# Patient Record
Sex: MALE | Race: WHITE | NOT HISPANIC OR LATINO | Employment: OTHER | URBAN - METROPOLITAN AREA
[De-identification: names, ages, dates, MRNs, and addresses within clinical notes are randomized per-mention and may not be internally consistent; named-entity substitution may affect disease eponyms.]

---

## 2017-02-15 ENCOUNTER — ALLSCRIPTS OFFICE VISIT (OUTPATIENT)
Dept: OTHER | Facility: OTHER | Age: 77
End: 2017-02-15

## 2017-02-15 LAB
CLARITY UR: NORMAL
COLOR UR: YELLOW
GLUCOSE (HISTORICAL): NORMAL
HGB UR QL STRIP.AUTO: NORMAL
KETONES UR STRIP-MCNC: NORMAL MG/DL
LEUKOCYTE ESTERASE UR QL STRIP: NORMAL
NITRITE UR QL STRIP: NORMAL
PH UR STRIP.AUTO: 5 [PH]
PROT UR STRIP-MCNC: NORMAL MG/DL
SP GR UR STRIP.AUTO: 1.01

## 2018-01-14 VITALS
HEIGHT: 66 IN | WEIGHT: 175 LBS | BODY MASS INDEX: 28.12 KG/M2 | HEART RATE: 84 BPM | SYSTOLIC BLOOD PRESSURE: 160 MMHG | DIASTOLIC BLOOD PRESSURE: 84 MMHG

## 2018-07-17 ENCOUNTER — TRANSCRIBE ORDERS (OUTPATIENT)
Dept: ADMINISTRATIVE | Facility: HOSPITAL | Age: 78
End: 2018-07-17

## 2018-07-17 DIAGNOSIS — R06.00 DYSPNEA, UNSPECIFIED TYPE: Primary | ICD-10-CM

## 2018-07-23 ENCOUNTER — HOSPITAL ENCOUNTER (OUTPATIENT)
Dept: PULMONOLOGY | Facility: HOSPITAL | Age: 78
Discharge: HOME | End: 2018-07-23
Attending: INTERNAL MEDICINE
Payer: MEDICARE

## 2018-07-23 DIAGNOSIS — R06.00 DYSPNEA, UNSPECIFIED TYPE: ICD-10-CM

## 2018-07-23 PROCEDURE — 94726 PLETHYSMOGRAPHY LUNG VOLUMES: CPT

## 2018-07-23 PROCEDURE — 94729 DIFFUSING CAPACITY: CPT | Performed by: INTERNAL MEDICINE

## 2018-07-23 PROCEDURE — 94726 PLETHYSMOGRAPHY LUNG VOLUMES: CPT | Performed by: INTERNAL MEDICINE

## 2018-07-23 PROCEDURE — 94060 EVALUATION OF WHEEZING: CPT | Performed by: INTERNAL MEDICINE

## 2018-07-23 PROCEDURE — 94760 N-INVAS EAR/PLS OXIMETRY 1: CPT

## 2018-07-23 PROCEDURE — 94729 DIFFUSING CAPACITY: CPT

## 2018-07-23 PROCEDURE — 94060 EVALUATION OF WHEEZING: CPT

## 2018-07-23 RX ORDER — ALBUTEROL SULFATE 2.5 MG/3ML
2.5 SOLUTION RESPIRATORY (INHALATION) ONCE
Status: COMPLETED | OUTPATIENT
Start: 2018-07-23 | End: 2018-07-23

## 2018-07-23 RX ADMIN — ALBUTEROL SULFATE 2.5 MG: 2.5 SOLUTION RESPIRATORY (INHALATION) at 09:40

## 2019-01-07 ENCOUNTER — TRANSCRIBE ORDERS (OUTPATIENT)
Dept: ADMINISTRATIVE | Facility: HOSPITAL | Age: 79
End: 2019-01-07

## 2019-01-07 DIAGNOSIS — M51.26 DISPLACEMENT OF LUMBAR INTERVERTEBRAL DISC WITHOUT MYELOPATHY: Primary | ICD-10-CM

## 2019-01-10 ENCOUNTER — HOSPITAL ENCOUNTER (OUTPATIENT)
Dept: RADIOLOGY | Age: 79
Discharge: HOME | End: 2019-01-10
Payer: MEDICARE

## 2019-01-10 DIAGNOSIS — M51.26 DISPLACEMENT OF LUMBAR INTERVERTEBRAL DISC WITHOUT MYELOPATHY: ICD-10-CM

## 2019-01-10 PROCEDURE — 72148 MRI LUMBAR SPINE W/O DYE: CPT

## 2019-01-21 ENCOUNTER — EVALUATION (OUTPATIENT)
Dept: PHYSICAL THERAPY | Facility: REHABILITATION | Age: 79
End: 2019-01-21
Payer: MEDICARE

## 2019-01-21 DIAGNOSIS — M54.41 ACUTE BILATERAL LOW BACK PAIN WITH RIGHT-SIDED SCIATICA: Primary | ICD-10-CM

## 2019-01-21 PROCEDURE — G8978 MOBILITY CURRENT STATUS: HCPCS | Performed by: PHYSICAL THERAPIST

## 2019-01-21 PROCEDURE — 97112 NEUROMUSCULAR REEDUCATION: CPT | Performed by: PHYSICAL THERAPIST

## 2019-01-21 PROCEDURE — G8979 MOBILITY GOAL STATUS: HCPCS | Performed by: PHYSICAL THERAPIST

## 2019-01-21 PROCEDURE — 97161 PT EVAL LOW COMPLEX 20 MIN: CPT | Performed by: PHYSICAL THERAPIST

## 2019-01-21 NOTE — PROGRESS NOTES
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, limiting his ability to walk, stand, and perform cleaning activities in his home  Impairments include:  1) Decreased Range of Motion   2) Aberrant Movement   3) Pain limiting Function     Etiologic factors include none recalled by the patient  Impairments: abnormal coordination, abnormal muscle firing, abnormal muscle tone, abnormal or restricted ROM, abnormal movement, activity intolerance, difficulty understanding, impaired physical strength, lacks appropriate home exercise program, pain with function, poor posture  and poor body mechanics    Symptom irritability: moderateUnderstanding of Dx/Px/POC: good   Prognosis: good  Prognosis details: Positive prognostic indicators include positive attitude toward recovery  Negative prognostic indicators include chronicity of symptoms,     Goals  Short Term Goal 1: Patient will be independent with home exercise program    Short Term Goal 2: Patient will be able to manage symptoms independently  Long Term Goal 1: Patient will be able to walk for 1/2 mile without complaints of low back pain  Long Term Goal 2: Patient will be able to play with his grandchildren without low back pain       Plan  Plan details: Prognosis above is given PT services 2x/week tapering to 1x/week over the next 2 months and home program adherence  Patient would benefit from: skilled physical therapy  Planned modality interventions: thermotherapy: hydrocollator packs  Planned therapy interventions: activity modification, joint mobilization, manual therapy, motor coordination training, neuromuscular re-education, patient education, self care, therapeutic activities, therapeutic exercise, graded activity, home exercise program and behavior modification  Plan of Care beginning date: 2019  Plan of Care expiration date: 3/18/2019  Treatment plan discussed with: patient        Subjective Evaluation    Pain  Current pain ratin  At best pain ratin  At worst pain ratin  Quality: radiating and sharp  Relieving factors: rest and relaxation  Aggravating factors: standing and walking  Progression: worsening    Patient Goals  Patient goals for therapy: decreased pain  Patient goal: Return to walking one mile, being able to play with his grandkids, and being able to clean around home without back pain         Objective     General Comments     Lumbar Comments  History of Pertinent Illness: Patient reports he has had chronic low back pain over the past ten years, that has recently exacerbated over the past 3 weeks  He states that he loves to walk with his wife; however, over the past 3 weeks, if he walks more than one block, he gets shooting pain down the front of his leg as well as pain in his back, and he has to sit down  He states that he had an MRI of his low back two weeks ago at [place] [address]  Functional Limitations: Standing and walking aggravates his pain, making it difficult to walk more than one block as well as do cleaning around his home that involves standing, such as dishes, laundry, and vacuuming  Social History: Patient lives in [address] home with his wife       Occupation: Retired                                   [address]                         Hip :                             Flex: 5/5 bilateral            Ext: 5/5 bilateral   ABD: 4-/5 bilateral    Add: 4-/5 bilateral    IR: 4+/5 bilateral   ER: 4+/5 bilateral       Lower Quarter Screen:   Dermatomal Screen: Diminished L4 and S1 sensation R side  Myomotal Screen: unremarkable  Reflexes: 2+ patellar tendon and achilles tendon reflexes bilaterally       Foot position in standing: normal ER in standing with no signs of forefoot nor rearfoot varus/valgus  GAIT: Normal   Palpation: land marks= Slight TTP of R L4 and L5 segments during PA mobilizations    Special test                Hip/SI joint:   scour= -     malik =  -   capsular mobility= -          long axis distraction (hip) = -      SLS=   R 8 seconds, L 2 seconds          test=-            P4 test=               Distraction= -             Active SLR= positive R                                          LUMBAR tests:  SLR = positive R    slump = negative          Repetitive testing: extension  = negative    flexion    =  Negative     No history of fracture, surgery, recent illness, osteoporosis       RED flags               no Night pain, no history of cancer, no LE swelling           Precautions: N/A    Daily Treatment Diary     Manual                                                                                   Exercise Diary  1/21            Prone Press Ups  1x10            HS Stretch with strap  20"x5                                                                                                                                                                                                                                                          Modalities

## 2019-01-22 ENCOUNTER — OFFICE VISIT (OUTPATIENT)
Dept: PHYSICAL THERAPY | Facility: REHABILITATION | Age: 79
End: 2019-01-22
Payer: MEDICARE

## 2019-01-22 DIAGNOSIS — M54.41 ACUTE BILATERAL LOW BACK PAIN WITH RIGHT-SIDED SCIATICA: Primary | ICD-10-CM

## 2019-01-22 PROCEDURE — 97112 NEUROMUSCULAR REEDUCATION: CPT | Performed by: PHYSICAL THERAPIST

## 2019-01-22 PROCEDURE — 97110 THERAPEUTIC EXERCISES: CPT | Performed by: PHYSICAL THERAPIST

## 2019-01-22 NOTE — PROGRESS NOTES
Daily Note     Today's date: 2019  Patient name: Karol Landaverde  : 1940  MRN: [id number]  Referring provider: Swathi Alejandro MD  Dx:   Encounter Diagnosis     ICD-10-CM    1  Acute bilateral low back pain with right-sided sciatica M54 41                   Subjective: Patient reports that he went grocery shopping today for about an hour  He states that he feels he was able to walk a bit better and further than he was before physical therapy  He states that he still does get the pain in his back and down his leg after he walks for a certain period of time  Objective: See treatment diary below  Manual                                                                                   Exercise Diary             Prone Press Ups  1x10 2x10           HS Stretch with strap  20"x5            Lower Trunk Rotation   2x10 (5" hold)           Recumbent bike   10'           Prone Press up R side bias   1x10           Bridges   2x10           Clamshells   2x10 green TB                                                                                                                                                                                        Modalities                                                         Assessment: Tolerated treatment well  He did not report symptoms of back pain during prone press ups or when walking around the clinic after performing this exercise  Prone press ups were again attempted with a R sided bias to see if pain would be elicited, which it was not  The patient did do well with initiation of hip abductor and extensor strengthening today  He has tendency to Valsalva, and was cued to breathe properly during concentric and eccentric portions of exercise  Patient demonstrated fatigue post treatment  Plan: Continue per plan of care

## 2019-01-29 ENCOUNTER — OFFICE VISIT (OUTPATIENT)
Dept: PHYSICAL THERAPY | Facility: REHABILITATION | Age: 79
End: 2019-01-29
Payer: MEDICARE

## 2019-01-29 DIAGNOSIS — M54.41 ACUTE BILATERAL LOW BACK PAIN WITH RIGHT-SIDED SCIATICA: Primary | ICD-10-CM

## 2019-01-29 PROCEDURE — 97140 MANUAL THERAPY 1/> REGIONS: CPT | Performed by: PHYSICAL THERAPIST

## 2019-01-29 PROCEDURE — 97110 THERAPEUTIC EXERCISES: CPT | Performed by: PHYSICAL THERAPIST

## 2019-01-29 NOTE — PROGRESS NOTES
Daily Note     Today's date: 2019  Patient name: Tony Lo  : 1940  MRN: [id number]  Referring provider: Lloyd Rubio MD  Dx:   Encounter Diagnosis     ICD-10-CM    1  Acute bilateral low back pain with right-sided sciatica M54 41                   Subjective: Patient reports that he was doing well after his last visit, until the last couple of days the pain became severe again  He was walking in the grocery store yesterday and had immediate pain in his low back       Objective: See treatment diary below    Ruled out R SIJ: Negative Gaenslens, Negative SI compression, Negative SI Distraction   Pain reproduced in the R toes during R L5 UPA     Manual              R L5 UPA's Grade III --> IV SE                                                                    Exercise Diary            Prone Press Ups  1x10 2x10 30          HS Stretch with strap  20"x5            Lower Trunk Rotation   2x10 (5" hold)           Recumbent bike   10'           Prone Press up R side bias   1x10           Bridges   2x10           Clamshells   2x10 green TB           Treadmill walking    20' (8 min increment/ 12' increment  1 4 mph 2 0 incline                                                                                                                                                                           Modalities                                                       Assessment: Tolerated treatment well  Much of the treatment was assessing the patient's ability to walk to see when back pain occurred  No reproduction of pain was elicited when walking on the treadmill  After walking, he did complain of mild pain centrally in his back that did not radiate  Toya Extension press ups were attempted, and there was no change in symptoms   R sided UPA's of L5 reproduced symptoms of numbness in toes of R foot, that went away when pressure was taken away during manual  Will need to continue to assess patient to see if he is responding to extension bias or if treatment plan needs to be changed towards segmental mobility, or another strategy  Patient was educated to attempt press ups as soon as he can after he has onset of back pain to see if he responds positively or negatively  Patient demonstrated fatigue post treatment      Plan: Continue per plan of care    Look at extension in weight bearing, including standing position  Take segmental mobility one step further to assess reproduction of pain with UPA's of lumbar spine

## 2019-02-01 ENCOUNTER — OFFICE VISIT (OUTPATIENT)
Dept: PHYSICAL THERAPY | Facility: REHABILITATION | Age: 79
End: 2019-02-01
Payer: MEDICARE

## 2019-02-01 DIAGNOSIS — M54.41 ACUTE BILATERAL LOW BACK PAIN WITH RIGHT-SIDED SCIATICA: Primary | ICD-10-CM

## 2019-02-01 PROCEDURE — 97110 THERAPEUTIC EXERCISES: CPT | Performed by: PHYSICAL THERAPIST

## 2019-02-01 PROCEDURE — 97140 MANUAL THERAPY 1/> REGIONS: CPT | Performed by: PHYSICAL THERAPIST

## 2019-02-01 NOTE — PROGRESS NOTES
Daily Note     Today's date: 2019  Patient name: Jess Muniz  : 1940  MRN: [id number]  Referring provider: Ignacio Kam MD  Dx: No diagnosis found  Subjective: Patient reports that his pain has been coming and going, yet he feels that he is able to do more activity  He states that he plowed both his driveway and his neighbor's driveway for 2 hours, and did not have much back pain at all  Objective: See treatment diary below       Manual             R L5 UPA's Grade III --> IV SE SE                                                                   Exercise Diary           Prone Press Ups  1x10 2x10 30 30         HS Stretch with strap  20"x5            Lower Trunk Rotation   2x10 (5" hold)           Recumbent bike   10'           Prone Press up R side bias   1x10           Bridges   2x10  2x10         Clamshells   2x10 green TB  2x20 orange TB         Treadmill walking    20' (8 min increment/ 12' increment  1 4 mph 2 0 incline  10' (1 5 mph)          Standing storks     1x10 orang e TB                                                                                                                                                             Modalities                                                       Assessment: Tolerated treatment well  Patient had first acute flare up and true "reproduction of symptoms" when performing standing right hip extension with a theraband  He did have alleviated symptoms after performing prone press ups  Patient does report some tingling in his toes that is intermittent, and still dull, midline low back pain  His functional activity tolerance has improved as he was able to walk more, and plow for 2 hours, yet he still does have acute flare ups of back pain at home, and the one today in the clinic   Will still need to work on progressing exercise and continuing with current extension based program to see if patient continues to trend in the right direction  His function has improved with the current treatment plan, yet his back pain has not completely gone away  He did state that he has not taken a pain pill in over a week in a half, and states he thinks his pain tolerance has improved  He also is highly compliant with physical therapy and does not want to rely on medications or injections  Patient demonstrated fatigue post treatment today  Plan: Continue per plan of care  Continue to assess patient's origin of pain utilizing extension based program and manual therapy

## 2019-02-05 ENCOUNTER — OFFICE VISIT (OUTPATIENT)
Dept: PHYSICAL THERAPY | Facility: REHABILITATION | Age: 79
End: 2019-02-05
Payer: MEDICARE

## 2019-02-05 DIAGNOSIS — M54.41 ACUTE BILATERAL LOW BACK PAIN WITH RIGHT-SIDED SCIATICA: Primary | ICD-10-CM

## 2019-02-05 PROCEDURE — 97110 THERAPEUTIC EXERCISES: CPT

## 2019-02-05 PROCEDURE — 97112 NEUROMUSCULAR REEDUCATION: CPT

## 2019-02-05 PROCEDURE — 97140 MANUAL THERAPY 1/> REGIONS: CPT

## 2019-02-05 NOTE — PROGRESS NOTES
Daily Note     Today's date: 2019  Patient name: Niko Santillan  : 1940  MRN: [id number]  Referring provider: Konnie Spatz, MD  Dx:   Encounter Diagnosis     ICD-10-CM    1  Acute bilateral low back pain with right-sided sciatica M54 41                   Subjective: Pt reports feeling improvement upon arrival, states that he still has some pain, but is able to walk further distances before pain increases  Pt states that sitting an resting for a short time makes pain subside  Objective: See treatment diary below        Manual            R L5 UPA's Grade III --> IV SE SE TP                                                                  Exercise Diary          Prone Press Ups  1x10 2x10 30 30 30x        HS Stretch with strap  20"x5            Lower Trunk Rotation   2x10 (5" hold)           Recumbent bike   10'           Prone Press up R side bias   1x10           Bridges   2x10  2x10 2x10        Clamshells   2x10 green TB  2x20 orange TB 20 ea b/l OTB        Treadmill walking    20' (8 min increment/ 12' increment  1 4 mph 2 0 incline  10' (1 5 mph)  10' (2 2mph)        Standing storks     1x10 orang e TB  10x ea 3 way OTB                                                                                                                                                           Modalities                                                         Assessment: Tolerated treatment well  Patient exhibited good technique with therapeutic exercises and would benefit from continued PT  Pt able to perform storks this session with no complaint of pain or radicular sx  Pt did have mild increase of pain with kicks forward during storks, no pain with ext or abd  Pt had some minor cramping with prone pushups that subsided with rest, but no increase in pain or sx  Pt able to progress speed on TM without complaint    Mobs performed by TP, PT   Pt  1:1 with PTA for entirety  Plan: Continue per plan of care

## 2019-02-12 ENCOUNTER — APPOINTMENT (OUTPATIENT)
Dept: PHYSICAL THERAPY | Facility: REHABILITATION | Age: 79
End: 2019-02-12
Payer: MEDICARE

## 2019-02-13 ENCOUNTER — OFFICE VISIT (OUTPATIENT)
Dept: PHYSICAL THERAPY | Facility: REHABILITATION | Age: 79
End: 2019-02-13
Payer: MEDICARE

## 2019-02-13 DIAGNOSIS — M54.41 ACUTE BILATERAL LOW BACK PAIN WITH RIGHT-SIDED SCIATICA: Primary | ICD-10-CM

## 2019-02-13 PROCEDURE — 97110 THERAPEUTIC EXERCISES: CPT | Performed by: PHYSICAL THERAPIST

## 2019-02-13 NOTE — PROGRESS NOTES
PT Discharge     Today's date: 2019  Patient name: Ayah Garcia  : 1940  MRN: [id number]  Referring provider: Shayy Landis MD  Dx: No diagnosis found  Assessment  Assessment details: Ayah Garcia is a pleasant 77 y o  male who presents with chronic low back pain  The patient's greatest concerns are worry over not knowing what's wrong, fear of not being able to keep active, and especially walking with his wife everyday  Since the patient has started physical therapy, he has made great improvements in his ability to perform daily activities, including walking with his wife, going to the grocery store, standing for long periods of time, and cleaning around his home  He has become independent with his home exercise program, and has a great understanding of how to continue exercises without external assistance  As a result of his functional improvements as well as decrease in symptoms, the patient is now being discharged from physical therapy  Goals  Short Term Goal 1: Patient will be independent with home exercise program  (met)  Short Term Goal 2: Patient will be able to manage symptoms independently  (met)    Long Term Goal 1: Patient will be able to walk for 1/2 mile without complaints of low back pain  (met)  Long Term Goal 2: Patient will be able to play with his grandchildren without low back pain  (met)    Plan  Patient will continue extension based exercises as well as walking program independently as he is being discharged from physical therapy           Subjective Evaluation    Pain  Current pain ratin  At best pain ratin  At worst pain ratin  Quality: dull ache    Patient Goals  Patient goals for therapy: decreased pain (met)   Patient goal: Return to walking one mile, being able to play with his grandkids, and being able to clean around home without back pain (met)        Objective     General Comments     Lumbar Comments  History of Pertinent Illness: Patient reports he has had chronic low back pain over the past ten years, that has recently exacerbated over the past 3 weeks  He states that he loves to walk with his wife; however, over the past 3 weeks, if he walks more than one block, he gets shooting pain down the front of his leg as well as pain in his back, and he has to sit down  He states that he had an MRI of his low back two weeks ago at [place] [address]  Functional Limitations: Standing and walking aggravates his pain, making it difficult to walk more than one block as well as do cleaning around his home that involves standing, such as dishes, laundry, and vacuuming  Social History: Patient lives in 2 story home with his wife       Occupation: Retired                                   [address]                         Hip :                             Flex: 5/5 bilateral          `  Ext: 5/5 bilateral   ABD: 4+/5 bilateral    Add: 4/5 bilateral    IR: 4+/5 bilateral   ER: 4+/5 bilateral       Lower Quarter Screen:   Dermatomal Screen: Diminished L4 and L5 sensation R side  Myomotal Screen: unremarkable  Reflexes: 2+ patellar tendon and achilles tendon reflexes bilaterally       Foot position in standing: normal ER in standing with no signs of forefoot nor rearfoot varus/valgus  GAIT: Normal   Palpation: unremarkable   Special test                Hip/SI joint:   scour= -     malik =  -   capsular mobility= -          long axis distraction (hip) = -      SLS=   R 10 seconds, L 5 seconds          test=-            P4 test=               Distraction= -             Active SLR= negative                                        LUMBAR tests:  SLR = negative R    slump = negative          Repetitive testing: extension  = negative    flexion    =  Negative     No history of fracture, surgery, recent illness, osteoporosis       RED flags               no Night pain, no history of cancer, no LE swelling Daily Treatment Diary   Manual  1/29 2/1 2/5          R L5 UPA's Grade III --> IV SE SE TP                                                                  Exercise Diary  1/21 1/22 1/29 2/1 2/5 2/13       Prone Press Ups  1x10 2x10 30 30 30x 30x       HS Stretch with strap  20"x5            Lower Trunk Rotation   2x10 (5" hold)           Recumbent bike   10'           Prone Press up R side bias   1x10           Bridges   2x10  2x10 2x10 2x10       Clamshells   2x10 green TB  2x20 orange TB 20 ea b/l OTB 20 ea b/l OTB       Treadmill walking    20' (8 min increment/ 12' increment  1 4 mph 2 0 incline  10' (1 5 mph)  10' (2 2mph) 10' 2 2 mph       Standing storks     1x10 orang e TB  10x ea 3 way OTB                                                                                                                                                           Modalities

## 2019-02-15 ENCOUNTER — APPOINTMENT (OUTPATIENT)
Dept: PHYSICAL THERAPY | Facility: REHABILITATION | Age: 79
End: 2019-02-15
Payer: MEDICARE

## 2019-04-10 ENCOUNTER — TELEPHONE (OUTPATIENT)
Dept: PAIN MEDICINE | Facility: CLINIC | Age: 79
End: 2019-04-10

## 2019-05-08 ENCOUNTER — OFFICE VISIT (OUTPATIENT)
Dept: PAIN MEDICINE | Facility: CLINIC | Age: 79
End: 2019-05-08
Payer: MEDICARE

## 2019-05-08 ENCOUNTER — HOSPITAL ENCOUNTER (OUTPATIENT)
Dept: RADIOLOGY | Facility: HOSPITAL | Age: 79
Discharge: HOME | End: 2019-05-08
Attending: ANESTHESIOLOGY
Payer: MEDICARE

## 2019-05-08 VITALS — SYSTOLIC BLOOD PRESSURE: 140 MMHG | TEMPERATURE: 97.8 F | DIASTOLIC BLOOD PRESSURE: 76 MMHG | HEART RATE: 84 BPM

## 2019-05-08 DIAGNOSIS — M48.062 LUMBAR STENOSIS WITH NEUROGENIC CLAUDICATION: Primary | ICD-10-CM

## 2019-05-08 DIAGNOSIS — M48.062 LUMBAR STENOSIS WITH NEUROGENIC CLAUDICATION: ICD-10-CM

## 2019-05-08 PROCEDURE — 73502 X-RAY EXAM HIP UNI 2-3 VIEWS: CPT

## 2019-05-08 PROCEDURE — 99204 OFFICE O/P NEW MOD 45 MIN: CPT | Performed by: ANESTHESIOLOGY

## 2019-05-08 RX ORDER — IRBESARTAN 150 MG/1
75 TABLET ORAL DAILY
COMMUNITY
Start: 2019-02-14 | End: 2020-12-21 | Stop reason: SDUPTHER

## 2019-05-08 RX ORDER — VITAMIN B COMPLEX
200 TABLET ORAL DAILY
COMMUNITY
Start: 2015-08-17

## 2019-05-08 RX ORDER — AMLODIPINE BESYLATE 5 MG/1
1 TABLET ORAL DAILY
COMMUNITY
Start: 2015-12-14 | End: 2020-09-28

## 2019-05-10 ENCOUNTER — TELEPHONE (OUTPATIENT)
Dept: PAIN MEDICINE | Facility: CLINIC | Age: 79
End: 2019-05-10

## 2019-05-10 ENCOUNTER — TRANSCRIBE ORDERS (OUTPATIENT)
Dept: PAIN MEDICINE | Facility: CLINIC | Age: 79
End: 2019-05-10

## 2019-05-28 ENCOUNTER — HOSPITAL ENCOUNTER (OUTPATIENT)
Dept: RADIOLOGY | Facility: CLINIC | Age: 79
Discharge: HOME | End: 2019-05-28
Attending: ANESTHESIOLOGY | Admitting: ANESTHESIOLOGY
Payer: MEDICARE

## 2019-05-28 VITALS
DIASTOLIC BLOOD PRESSURE: 81 MMHG | OXYGEN SATURATION: 97 % | RESPIRATION RATE: 20 BRPM | HEART RATE: 91 BPM | SYSTOLIC BLOOD PRESSURE: 155 MMHG | TEMPERATURE: 98.2 F

## 2019-05-28 DIAGNOSIS — M48.062 LUMBAR STENOSIS WITH NEUROGENIC CLAUDICATION: ICD-10-CM

## 2019-05-28 PROCEDURE — 64484 NJX AA&/STRD TFRM EPI L/S EA: CPT | Performed by: ANESTHESIOLOGY

## 2019-05-28 PROCEDURE — 64483 NJX AA&/STRD TFRM EPI L/S 1: CPT | Performed by: ANESTHESIOLOGY

## 2019-05-28 RX ORDER — METHYLPREDNISOLONE ACETATE 80 MG/ML
80 INJECTION, SUSPENSION INTRA-ARTICULAR; INTRALESIONAL; INTRAMUSCULAR; PARENTERAL; SOFT TISSUE ONCE
Status: COMPLETED | OUTPATIENT
Start: 2019-05-28 | End: 2019-05-28

## 2019-05-28 RX ORDER — 0.9 % SODIUM CHLORIDE 0.9 %
10 VIAL (ML) INJECTION ONCE
Status: COMPLETED | OUTPATIENT
Start: 2019-05-28 | End: 2019-05-28

## 2019-05-28 RX ORDER — BUPIVACAINE HCL/PF 2.5 MG/ML
10 VIAL (ML) INJECTION ONCE
Status: COMPLETED | OUTPATIENT
Start: 2019-05-28 | End: 2019-05-28

## 2019-05-28 RX ORDER — DIPHENOXYLATE HYDROCHLORIDE AND ATROPINE SULFATE 2.5; .025 MG/1; MG/1
1 TABLET ORAL DAILY
COMMUNITY

## 2019-05-28 RX ORDER — MELATONIN 10 MG
2000 TABLET, SUBLINGUAL SUBLINGUAL DAILY
COMMUNITY

## 2019-05-28 RX ADMIN — METHYLPREDNISOLONE ACETATE 80 MG: 80 INJECTION, SUSPENSION INTRA-ARTICULAR; INTRALESIONAL; INTRAMUSCULAR; PARENTERAL; SOFT TISSUE at 10:00

## 2019-05-28 RX ADMIN — Medication 5 ML: at 09:55

## 2019-05-28 RX ADMIN — SODIUM CHLORIDE 5 ML: 9 INJECTION, SOLUTION INTRAMUSCULAR; INTRAVENOUS; SUBCUTANEOUS at 09:55

## 2019-05-28 RX ADMIN — BUPIVACAINE HYDROCHLORIDE 2 ML: 2.5 INJECTION, SOLUTION EPIDURAL; INFILTRATION; INTRACAUDAL at 10:00

## 2019-05-28 RX ADMIN — IOHEXOL 1 ML: 300 INJECTION, SOLUTION INTRAVENOUS at 09:59

## 2019-06-04 ENCOUNTER — TELEPHONE (OUTPATIENT)
Dept: PAIN MEDICINE | Facility: CLINIC | Age: 79
End: 2019-06-04

## 2019-06-04 NOTE — TELEPHONE ENCOUNTER
Patient states he has 80% improvement and his pain level 2 - 3  Patient states he started experience "sean horse," more often since he's had his injection  He would like to know if that is a normal side effect or what may be causing them   Please advise, nathan    Call back# [phone]

## 2019-07-15 ENCOUNTER — APPOINTMENT (OUTPATIENT)
Dept: LAB | Age: 79
End: 2019-07-15
Payer: MEDICARE

## 2019-07-15 ENCOUNTER — TRANSCRIBE ORDERS (OUTPATIENT)
Dept: ADMINISTRATIVE | Age: 79
End: 2019-07-15

## 2019-07-15 DIAGNOSIS — Z12.5 SPECIAL SCREENING FOR MALIGNANT NEOPLASM OF PROSTATE: ICD-10-CM

## 2019-07-15 DIAGNOSIS — Z00.01 ENCOUNTER FOR GENERAL ADULT MEDICAL EXAMINATION WITH ABNORMAL FINDINGS: ICD-10-CM

## 2019-07-15 DIAGNOSIS — E03.9 MYXEDEMA HEART DISEASE: ICD-10-CM

## 2019-07-15 DIAGNOSIS — E55.9 VITAMIN D DEFICIENCY: ICD-10-CM

## 2019-07-15 DIAGNOSIS — I51.9 MYXEDEMA HEART DISEASE: Primary | ICD-10-CM

## 2019-07-15 DIAGNOSIS — E78.5 HYPERLIPIDEMIA, UNSPECIFIED HYPERLIPIDEMIA TYPE: ICD-10-CM

## 2019-07-15 DIAGNOSIS — I51.9 MYXEDEMA HEART DISEASE: ICD-10-CM

## 2019-07-15 DIAGNOSIS — E03.9 MYXEDEMA HEART DISEASE: Primary | ICD-10-CM

## 2019-07-15 LAB
25(OH)D3 SERPL-MCNC: 33.7 NG/ML (ref 30–100)
ALBUMIN SERPL BCP-MCNC: 3.7 G/DL (ref 3.5–5)
ALP SERPL-CCNC: 72 U/L (ref 46–116)
ALT SERPL W P-5'-P-CCNC: 35 U/L (ref 12–78)
ANION GAP SERPL CALCULATED.3IONS-SCNC: 4 MMOL/L (ref 4–13)
AST SERPL W P-5'-P-CCNC: 20 U/L (ref 5–45)
BACTERIA UR QL AUTO: NORMAL /HPF
BASOPHILS # BLD AUTO: 0.1 THOUSANDS/ΜL (ref 0–0.1)
BASOPHILS NFR BLD AUTO: 1 % (ref 0–1)
BILIRUB SERPL-MCNC: 0.78 MG/DL (ref 0.2–1)
BILIRUB UR QL STRIP: NEGATIVE
BUN SERPL-MCNC: 19 MG/DL (ref 5–25)
CALCIUM SERPL-MCNC: 8.7 MG/DL (ref 8.3–10.1)
CHLORIDE SERPL-SCNC: 106 MMOL/L (ref 100–108)
CHOLEST SERPL-MCNC: 151 MG/DL (ref 50–200)
CLARITY UR: NORMAL
CO2 SERPL-SCNC: 31 MMOL/L (ref 21–32)
COLOR UR: YELLOW
CREAT SERPL-MCNC: 1 MG/DL (ref 0.6–1.3)
EOSINOPHIL # BLD AUTO: 0.29 THOUSAND/ΜL (ref 0–0.61)
EOSINOPHIL NFR BLD AUTO: 4 % (ref 0–6)
ERYTHROCYTE [DISTWIDTH] IN BLOOD BY AUTOMATED COUNT: 13.2 % (ref 11.6–15.1)
GFR SERPL CREATININE-BSD FRML MDRD: 71 ML/MIN/1.73SQ M
GLUCOSE P FAST SERPL-MCNC: 109 MG/DL (ref 65–99)
GLUCOSE UR STRIP-MCNC: NEGATIVE MG/DL
HCT VFR BLD AUTO: 46.7 % (ref 36.5–49.3)
HDLC SERPL-MCNC: 55 MG/DL (ref 40–60)
HGB BLD-MCNC: 15.3 G/DL (ref 12–17)
HGB UR QL STRIP.AUTO: NEGATIVE
HYALINE CASTS #/AREA URNS LPF: NORMAL /LPF
IMM GRANULOCYTES # BLD AUTO: 0.03 THOUSAND/UL (ref 0–0.2)
IMM GRANULOCYTES NFR BLD AUTO: 0 % (ref 0–2)
KETONES UR STRIP-MCNC: NEGATIVE MG/DL
LDLC SERPL CALC-MCNC: 71 MG/DL (ref 0–100)
LEUKOCYTE ESTERASE UR QL STRIP: NEGATIVE
LYMPHOCYTES # BLD AUTO: 2.27 THOUSANDS/ΜL (ref 0.6–4.47)
LYMPHOCYTES NFR BLD AUTO: 31 % (ref 14–44)
MCH RBC QN AUTO: 29.9 PG (ref 26.8–34.3)
MCHC RBC AUTO-ENTMCNC: 32.8 G/DL (ref 31.4–37.4)
MCV RBC AUTO: 91 FL (ref 82–98)
MONOCYTES # BLD AUTO: 0.84 THOUSAND/ΜL (ref 0.17–1.22)
MONOCYTES NFR BLD AUTO: 11 % (ref 4–12)
NEUTROPHILS # BLD AUTO: 3.88 THOUSANDS/ΜL (ref 1.85–7.62)
NEUTS SEG NFR BLD AUTO: 53 % (ref 43–75)
NITRITE UR QL STRIP: NEGATIVE
NON-SQ EPI CELLS URNS QL MICRO: NORMAL /HPF
NONHDLC SERPL-MCNC: 96 MG/DL
NRBC BLD AUTO-RTO: 0 /100 WBCS
PH UR STRIP.AUTO: 7.5 [PH]
PLATELET # BLD AUTO: 252 THOUSANDS/UL (ref 149–390)
PMV BLD AUTO: 11.1 FL (ref 8.9–12.7)
POTASSIUM SERPL-SCNC: 4.1 MMOL/L (ref 3.5–5.3)
PROT SERPL-MCNC: 7.4 G/DL (ref 6.4–8.2)
PROT UR STRIP-MCNC: NEGATIVE MG/DL
PSA SERPL-MCNC: 3.1 NG/ML (ref 0–4)
RBC # BLD AUTO: 5.11 MILLION/UL (ref 3.88–5.62)
RBC #/AREA URNS AUTO: NORMAL /HPF
SODIUM SERPL-SCNC: 141 MMOL/L (ref 136–145)
SP GR UR STRIP.AUTO: 1.02 (ref 1–1.03)
TRIGL SERPL-MCNC: 125 MG/DL
TSH SERPL DL<=0.05 MIU/L-ACNC: 3.66 UIU/ML (ref 0.36–3.74)
UROBILINOGEN UR QL STRIP.AUTO: 0.2 E.U./DL
WBC # BLD AUTO: 7.41 THOUSAND/UL (ref 4.31–10.16)
WBC #/AREA URNS AUTO: NORMAL /HPF

## 2019-07-15 PROCEDURE — 85025 COMPLETE CBC W/AUTO DIFF WBC: CPT

## 2019-07-15 PROCEDURE — 84443 ASSAY THYROID STIM HORMONE: CPT

## 2019-07-15 PROCEDURE — 82306 VITAMIN D 25 HYDROXY: CPT

## 2019-07-15 PROCEDURE — 36415 COLL VENOUS BLD VENIPUNCTURE: CPT

## 2019-07-15 PROCEDURE — 80053 COMPREHEN METABOLIC PANEL: CPT

## 2019-07-15 PROCEDURE — G0103 PSA SCREENING: HCPCS

## 2019-07-15 PROCEDURE — 80061 LIPID PANEL: CPT

## 2019-07-15 PROCEDURE — 81001 URINALYSIS AUTO W/SCOPE: CPT

## 2019-09-03 ENCOUNTER — TRANSCRIBE ORDERS (OUTPATIENT)
Dept: ADMINISTRATIVE | Age: 79
End: 2019-09-03

## 2019-09-03 ENCOUNTER — APPOINTMENT (OUTPATIENT)
Dept: RADIOLOGY | Age: 79
End: 2019-09-03
Payer: MEDICARE

## 2019-09-03 DIAGNOSIS — S30.0XXA CONTUSION OF BUTTOCK, INITIAL ENCOUNTER: Primary | ICD-10-CM

## 2019-09-03 DIAGNOSIS — S30.0XXA CONTUSION OF BUTTOCK, INITIAL ENCOUNTER: ICD-10-CM

## 2019-09-03 PROCEDURE — 72110 X-RAY EXAM L-2 SPINE 4/>VWS: CPT

## 2019-10-30 ENCOUNTER — TRANSCRIBE ORDERS (OUTPATIENT)
Dept: ADMINISTRATIVE | Facility: HOSPITAL | Age: 79
End: 2019-10-30

## 2019-10-30 DIAGNOSIS — I73.9 PVD (PERIPHERAL VASCULAR DISEASE) (HCC): Primary | ICD-10-CM

## 2019-11-13 ENCOUNTER — HOSPITAL ENCOUNTER (OUTPATIENT)
Dept: NON INVASIVE DIAGNOSTICS | Facility: CLINIC | Age: 79
Discharge: HOME | End: 2019-11-13
Payer: COMMERCIAL

## 2019-11-13 DIAGNOSIS — I73.9 PVD (PERIPHERAL VASCULAR DISEASE) (HCC): ICD-10-CM

## 2019-11-13 PROCEDURE — 93922 UPR/L XTREMITY ART 2 LEVELS: CPT

## 2019-11-13 PROCEDURE — VASC: Performed by: SURGERY

## 2020-07-22 ENCOUNTER — OFFICE VISIT (OUTPATIENT)
Dept: OBGYN CLINIC | Facility: HOSPITAL | Age: 80
End: 2020-07-22
Payer: MEDICARE

## 2020-07-22 VITALS
WEIGHT: 171.4 LBS | BODY MASS INDEX: 27.55 KG/M2 | SYSTOLIC BLOOD PRESSURE: 175 MMHG | DIASTOLIC BLOOD PRESSURE: 79 MMHG | HEIGHT: 66 IN | HEART RATE: 106 BPM

## 2020-07-22 DIAGNOSIS — G56.03 CARPAL TUNNEL SYNDROME, BILATERAL: Primary | ICD-10-CM

## 2020-07-22 PROCEDURE — 99203 OFFICE O/P NEW LOW 30 MIN: CPT | Performed by: ORTHOPAEDIC SURGERY

## 2020-07-22 NOTE — PROGRESS NOTES
ASSESSMENT/PLAN:    Assessment:   Carpal Tunnel Syndrome  bilateral    Plan:   70-year-old male with bilateral hand numbness, likely due to atypical carpal tunnel syndrome  We will schedule patient for ultrasound to evaluate for signs of compression of the carpal tunnel  Patient should return to the office upon completion of the ultrasound to review the results  Follow Up: After Testing    General Discussions:     Carpal Tunnel Syndrome: The anatomy and physiology of carpal tunnel syndrome was discussed with the patient today  Increase pressure localized under the transverse carpal ligament can cause pain, numbness, tingling, or dysesthesias within the median nerve distribution as well as feelings of fatigue, clumsiness, or awkwardness  These symptoms typically occur at night and worse in the morning upon waking  Eventually, untreated carpal tunnel syndrome can result in weakness and permanent loss of muscle within the thenar compartment of the hand  Treatment options were discussed with the patient  Conservative treatment includes nocturnal resting splints to keep the nerve in a neutral position, ergonomic changes within the work or home environment, activity modification, and tendon gliding exercises  Steroid injections within the carpal canal can help a majority of patients, however this is often self-limited in a majority of patients  Surgical intervention to divide the transverse carpal ligament typically results in a long-lasting relief of the patient's complaints, with the recurrence rate of less than 1%        _____________________________________________________  CHIEF COMPLAINT:  Chief Complaint   Patient presents with    Right Hand - Numbness, Tingling    Left Hand - Numbness, Tingling         SUBJECTIVE:  Albert Lopes is a [de-identified] y o  male who presents with bilateral hand numbness  Numbness has been going on for years    It is primarily located in the thumb, index, and ring fingers on the dorsal aspect of the hand  It is worse on the right than the left  Symptoms are worse at night or with prolonged flexion such as when he is coloring  Positive flick sign  Denies any associated pain or weakness  Patient is right-hand dominant and is retired  He previously worked an office job  He enjoys playing piano  PAST MEDICAL HISTORY:  Past Medical History:   Diagnosis Date    Arthritis     Hypertension     Skin cancer        PAST SURGICAL HISTORY:  Past Surgical History:   Procedure Laterality Date    KIDNEY STONE SURGERY      MENISCECTOMY         FAMILY HISTORY:  Family History   Problem Relation Age of Onset    Arthritis Family     Hypertension Family     Cancer Family        SOCIAL HISTORY:  Social History     Tobacco Use    Smoking status: Never Smoker    Smokeless tobacco: Never Used   Substance Use Topics    Alcohol use: Never     Frequency: Never    Drug use: Never       MEDICATIONS:    Current Outpatient Medications:     amLODIPine (NORVASC) 5 mg tablet, Take 1 tablet by mouth daily, Disp: , Rfl:     Cholecalciferol (VITAMIN D3) 20545 units TABS, Take by mouth, Disp: , Rfl:     Coenzyme Q10 (COQ10) 100 MG CAPS, Take 200 mg by mouth daily, Disp: , Rfl:     irbesartan (AVAPRO) 150 mg tablet, , Disp: , Rfl:     magnesium gluconate (MAGONATE) 500 mg tablet, Take 500 mg by mouth 2 (two) times a day, Disp: , Rfl:     multivitamin (THERAGRAN) TABS, Take 1 tablet by mouth daily, Disp: , Rfl:     Omega-3 Fatty Acids (OMEGA-3 CF) 1000 MG CAPS, Take 1 capsule by mouth daily, Disp: , Rfl:     ALLERGIES:  Allergies   Allergen Reactions    Sulfate        REVIEW OF SYSTEMS:  Pertinent items are noted in HPI  A comprehensive review of systems was negative      LABS:  HgA1c: No results found for: HGBA1C  BMP:   Lab Results   Component Value Date    CALCIUM 8 7 07/15/2019    K 4 1 07/15/2019    CO2 31 07/15/2019     07/15/2019    BUN 19 07/15/2019    CREATININE 1 00 07/15/2019 _____________________________________________________  PHYSICAL EXAMINATION:  Vital signs: BP (!) 175/79   Pulse (!) 106   Ht 5' 6" (1 676 m)   Wt 77 7 kg (171 lb 6 4 oz)   BMI 27 66 kg/m²   General: well developed and well nourished, alert, oriented times 3 and appears comfortable  Psychiatric: Normal  HEENT: Trachea Midline, No torticollis  Cardiovascular: No discernable arrhythmia  Pulmonary: No wheezing or stridor  Skin: No masses, erythema, lacerations, fluctation, ulcerations  Neurovascular: Sensation Intact to the Median, Ulnar, Radial Nerve, Motor Intact to the Median, Ulnar, Radial Nerve and Pulses Intact    MUSCULOSKELETAL EXAMINATION:  Right hand  -skin intact without erythema or ecchymosis  -no tenderness to palpation  -sensation intact to light touch M/R/U  -motor 5/5 biceps, triceps, wrist flexion, wrist extension, FPL, interossei, 4/5 APB  -2 sec cap refill  -positive Tinel at the wrist, negative Tinel at the elbow, positive flexion compression test at the carpal tunnel    Left hand  -skin intact without erythema or ecchymosis  -no tenderness to palpation  -sensation intact to light touch M/R/U  -motor 5/5 biceps, triceps, wrist flexion, wrist extension, interossei, FPL, 4+/5 apb  -2 sec capillary refill  -negative Tinel at the wrist and elbow, positive flexion compression test    _____________________________________________________  STUDIES REVIEWED:  No Studies to review      PROCEDURES PERFORMED:  No Procedures performed today      I interviewed, took the history and examined the patient  I discuss the case with the resident and reviewed the resident's note  I supervised the resident and I agree with the resident management plan as it was presented to me  I was present in the clinic and examined the patient

## 2020-07-30 ENCOUNTER — TELEPHONE (OUTPATIENT)
Dept: OBGYN CLINIC | Facility: HOSPITAL | Age: 80
End: 2020-07-30

## 2020-07-30 ENCOUNTER — TRANSCRIBE ORDERS (OUTPATIENT)
Dept: RADIOLOGY | Facility: HOSPITAL | Age: 80
End: 2020-07-30

## 2020-07-30 ENCOUNTER — HOSPITAL ENCOUNTER (OUTPATIENT)
Dept: RADIOLOGY | Facility: HOSPITAL | Age: 80
Discharge: HOME | End: 2020-07-30
Payer: MEDICARE

## 2020-07-30 DIAGNOSIS — G56.03 CARPAL TUNNEL SYNDROME, BILATERAL: ICD-10-CM

## 2020-07-30 PROCEDURE — 76882 US LMTD JT/FCL EVL NVASC XTR: CPT

## 2020-07-30 NOTE — TELEPHONE ENCOUNTER
Rosa Lewis    [phone]    Dr Bandar Aguilar    Patient would like to update his medications  He states that some of the medications/dosages are incorrect

## 2020-08-12 ENCOUNTER — OFFICE VISIT (OUTPATIENT)
Dept: OBGYN CLINIC | Facility: HOSPITAL | Age: 80
End: 2020-08-12
Payer: MEDICARE

## 2020-08-12 VITALS
BODY MASS INDEX: 27.83 KG/M2 | HEIGHT: 66 IN | WEIGHT: 173.2 LBS | HEART RATE: 91 BPM | DIASTOLIC BLOOD PRESSURE: 73 MMHG | SYSTOLIC BLOOD PRESSURE: 163 MMHG

## 2020-08-12 DIAGNOSIS — G56.02 CARPAL TUNNEL SYNDROME OF LEFT WRIST: Primary | ICD-10-CM

## 2020-08-12 DIAGNOSIS — G56.01 CARPAL TUNNEL SYNDROME OF RIGHT WRIST: ICD-10-CM

## 2020-08-12 PROCEDURE — 99213 OFFICE O/P EST LOW 20 MIN: CPT | Performed by: ORTHOPAEDIC SURGERY

## 2020-08-12 NOTE — PROGRESS NOTES
ASSESSMENT/PLAN:    Assessment:   Bilateral carpal tunnel    Plan:   Continue night time splints  Call with increased symptoms (numbness, weakness, pain)    Follow Up:  PRN      General Discussions:  Carpal Tunnel Syndrome: The anatomy and physiology of carpal tunnel syndrome was discussed with the patient today  Increase pressure localized under the transverse carpal ligament can cause pain, numbness, tingling, or dysesthesias within the median nerve distribution as well as feelings of fatigue, clumsiness, or awkwardness  These symptoms typically occur at night and worse in the morning upon waking  Eventually, untreated carpal tunnel syndrome can result in weakness and permanent loss of muscle within the thenar compartment of the hand  Treatment options were discussed with the patient  Conservative treatment includes nocturnal resting splints to keep the nerve in a neutral position, ergonomic changes within the work or home environment, activity modification, and tendon gliding exercises  Steroid injections within the carpal canal can help a majority of patients, however this is often self-limited in a majority of patients  Surgical intervention to divide the transverse carpal ligament typically results in a long-lasting relief of the patient's complaints, with the recurrence rate of less than 1%        _____________________________________________________  CHIEF COMPLAINT:  Chief Complaint   Patient presents with    Right Hand - Follow-up    Left Hand - Follow-up         SUBJECTIVE:  Ayah Garcia is a [de-identified] y o  male who presents with bilateral hand numbness  He is here today to discuss the finding of his B/L wrist US  Patient has been wearing night time splints as instructed  He notes overall improvement in his symptoms  He is please with results from splinting       PAST MEDICAL HISTORY:  Past Medical History:   Diagnosis Date    Arthritis     Hypertension     Skin cancer        PAST SURGICAL HISTORY:  Past Surgical History:   Procedure Laterality Date    KIDNEY STONE SURGERY      MENISCECTOMY         FAMILY HISTORY:  Family History   Problem Relation Age of Onset    Arthritis Family     Hypertension Family     Cancer Family        SOCIAL HISTORY:  Social History     Tobacco Use    Smoking status: Never Smoker    Smokeless tobacco: Never Used   Substance Use Topics    Alcohol use: Never     Frequency: Never    Drug use: Never       MEDICATIONS:    Current Outpatient Medications:     amLODIPine (NORVASC) 5 mg tablet, Take 1 tablet by mouth daily, Disp: , Rfl:     Cholecalciferol (VITAMIN D3) 21696 units TABS, Take 1,000 Units by mouth daily, Disp: , Rfl:     Coenzyme Q10 (COQ10) 100 MG CAPS, Take 200 mg by mouth daily, Disp: , Rfl:     irbesartan (AVAPRO) 150 mg tablet, 75 mg daily, Disp: , Rfl:     magnesium gluconate (MAGONATE) 500 mg tablet, Take 1,000 mg by mouth daily, Disp: , Rfl:     multivitamin (THERAGRAN) TABS, Take 1 tablet by mouth daily, Disp: , Rfl:     Omega-3 Fatty Acids (OMEGA-3 CF) 1000 MG CAPS, Take 1 capsule by mouth daily, Disp: , Rfl:     ALLERGIES:  Allergies   Allergen Reactions    Sulfate        REVIEW OF SYSTEMS:  Pertinent items are noted in HPI  A comprehensive review of systems was negative      LABS:  HgA1c: No results found for: HGBA1C  BMP:   Lab Results   Component Value Date    CALCIUM 8 7 07/15/2019    K 4 1 07/15/2019    CO2 31 07/15/2019     07/15/2019    BUN 19 07/15/2019    CREATININE 1 00 07/15/2019           _____________________________________________________    PHYSICAL EXAMINATION:  /73   Pulse 91   Ht 5' 6" (1 676 m)   Wt 78 6 kg (173 lb 3 2 oz)   BMI 27 96 kg/m²   General: well developed and well nourished, alert, oriented times 3 and appears comfortable  Psychiatric: Normal  HEENT: Trachea Midline, No torticollis  Cardiovascular: No discernable arrhythmia  Pulmonary: No wheezing or stridor  Skin: No masses, erythema, lacerations, fluctation, ulcerations  Neurovascular: Sensation Intact to the Median, Ulnar, Radial Nerve, Motor Intact to the Median, Ulnar, Radial Nerve and Pulses Intact    MUSCULOSKELETAL EXAMINATION:  Right Carpal Tunnel Exam:  Negative thenar atrophy  Negative phalen's test  Positive carpal tunnel compression  Positive tinels over median nerve at the wrist   Intrinsic 5/5, AIN 5/5, APB 5/5     Left Carpal Tunnel Exam:  Negative thenar atrophy  Negative phalen's test  Negative carpal tunnel compression  Negative tinels over median nerve at the wrist    Intrinsic 5/5, AIN 5/5, APB 5/5        _____________________________________________________  STUDIES REVIEWED:  US Bilateral wrists carpal tunnel ruled in        PROCEDURES PERFORMED:  Procedures  No Procedures performed today      Scribe Attestation    I,:   Liss Blanchard am acting as a scribe while in the presence of the attending physician :        I,:   Andrews Saab MD personally performed the services described in this documentation    as scribed in my presence :

## 2020-09-16 ENCOUNTER — CLINICAL SUPPORT (OUTPATIENT)
Dept: INTERNAL MEDICINE CLINIC | Facility: CLINIC | Age: 80
End: 2020-09-16
Payer: MEDICARE

## 2020-09-16 VITALS — TEMPERATURE: 97.5 F

## 2020-09-16 DIAGNOSIS — Z23 NEED FOR INFLUENZA VACCINATION: Primary | ICD-10-CM

## 2020-09-16 PROCEDURE — 90674 CCIIV4 VAC NO PRSV 0.5 ML IM: CPT

## 2020-09-16 PROCEDURE — G0008 ADMIN INFLUENZA VIRUS VAC: HCPCS

## 2020-09-28 DIAGNOSIS — I10 ESSENTIAL HYPERTENSION: Primary | ICD-10-CM

## 2020-09-28 RX ORDER — AMLODIPINE BESYLATE 5 MG/1
TABLET ORAL
Qty: 90 TABLET | Refills: 3 | Status: SHIPPED | OUTPATIENT
Start: 2020-09-28 | End: 2020-11-02 | Stop reason: SDUPTHER

## 2020-11-02 DIAGNOSIS — I10 ESSENTIAL HYPERTENSION: ICD-10-CM

## 2020-11-03 RX ORDER — AMLODIPINE BESYLATE 5 MG/1
5 TABLET ORAL DAILY
Qty: 90 TABLET | Refills: 0 | Status: SHIPPED | OUTPATIENT
Start: 2020-11-03 | End: 2020-12-22

## 2020-11-17 ENCOUNTER — OFFICE VISIT (OUTPATIENT)
Dept: INTERNAL MEDICINE CLINIC | Facility: CLINIC | Age: 80
End: 2020-11-17
Payer: MEDICARE

## 2020-11-17 VITALS
TEMPERATURE: 97.5 F | OXYGEN SATURATION: 97 % | BODY MASS INDEX: 28.28 KG/M2 | HEART RATE: 89 BPM | DIASTOLIC BLOOD PRESSURE: 75 MMHG | SYSTOLIC BLOOD PRESSURE: 140 MMHG | WEIGHT: 176 LBS | HEIGHT: 66 IN

## 2020-11-17 DIAGNOSIS — R35.1 BPH ASSOCIATED WITH NOCTURIA: ICD-10-CM

## 2020-11-17 DIAGNOSIS — E55.9 VITAMIN D DEFICIENCY, UNSPECIFIED: ICD-10-CM

## 2020-11-17 DIAGNOSIS — I10 ESSENTIAL HYPERTENSION: Primary | ICD-10-CM

## 2020-11-17 DIAGNOSIS — G56.03 BILATERAL CARPAL TUNNEL SYNDROME: ICD-10-CM

## 2020-11-17 DIAGNOSIS — N40.1 BPH ASSOCIATED WITH NOCTURIA: ICD-10-CM

## 2020-11-17 DIAGNOSIS — E78.2 MIXED HYPERLIPIDEMIA: ICD-10-CM

## 2020-11-17 PROCEDURE — 99214 OFFICE O/P EST MOD 30 MIN: CPT | Performed by: INTERNAL MEDICINE

## 2020-11-25 ENCOUNTER — OFFICE VISIT (OUTPATIENT)
Dept: OBGYN CLINIC | Facility: HOSPITAL | Age: 80
End: 2020-11-25
Payer: MEDICARE

## 2020-11-25 VITALS
WEIGHT: 179 LBS | HEIGHT: 66 IN | BODY MASS INDEX: 28.77 KG/M2 | HEART RATE: 92 BPM | DIASTOLIC BLOOD PRESSURE: 70 MMHG | SYSTOLIC BLOOD PRESSURE: 183 MMHG

## 2020-11-25 DIAGNOSIS — G56.02 CARPAL TUNNEL SYNDROME OF LEFT WRIST: ICD-10-CM

## 2020-11-25 DIAGNOSIS — G56.01 CARPAL TUNNEL SYNDROME OF RIGHT WRIST: Primary | ICD-10-CM

## 2020-11-25 PROCEDURE — 99214 OFFICE O/P EST MOD 30 MIN: CPT | Performed by: ORTHOPAEDIC SURGERY

## 2020-12-21 DIAGNOSIS — I10 ESSENTIAL HYPERTENSION: Primary | ICD-10-CM

## 2020-12-21 RX ORDER — IRBESARTAN 75 MG/1
75 TABLET ORAL DAILY
Qty: 90 TABLET | Refills: 1 | Status: SHIPPED | OUTPATIENT
Start: 2020-12-21 | End: 2021-03-02

## 2020-12-22 DIAGNOSIS — I10 ESSENTIAL HYPERTENSION: ICD-10-CM

## 2020-12-22 RX ORDER — AMLODIPINE BESYLATE 5 MG/1
TABLET ORAL
Qty: 90 TABLET | Refills: 3 | Status: SHIPPED | OUTPATIENT
Start: 2020-12-22 | End: 2021-02-04 | Stop reason: SDUPTHER

## 2021-01-18 ENCOUNTER — TELEPHONE (OUTPATIENT)
Dept: OBGYN CLINIC | Facility: HOSPITAL | Age: 81
End: 2021-01-18

## 2021-01-18 NOTE — TELEPHONE ENCOUNTER
Patient Sees Dr Vega: Patient wanted to know what time his surgery is tomorrow 01/19    Hope Ferreira- [phone]

## 2021-01-19 ENCOUNTER — HOSPITAL ENCOUNTER (OUTPATIENT)
Facility: HOSPITAL | Age: 81
Setting detail: OUTPATIENT SURGERY
Discharge: HOME | End: 2021-01-19
Attending: ORTHOPAEDIC SURGERY | Admitting: ORTHOPAEDIC SURGERY
Payer: MEDICARE

## 2021-01-19 VITALS
RESPIRATION RATE: 20 BRPM | TEMPERATURE: 96.6 F | DIASTOLIC BLOOD PRESSURE: 74 MMHG | HEART RATE: 97 BPM | HEIGHT: 66 IN | WEIGHT: 180 LBS | OXYGEN SATURATION: 97 % | BODY MASS INDEX: 28.93 KG/M2 | SYSTOLIC BLOOD PRESSURE: 148 MMHG

## 2021-01-19 DIAGNOSIS — G56.01 CARPAL TUNNEL SYNDROME OF RIGHT WRIST: Primary | ICD-10-CM

## 2021-01-19 PROCEDURE — 29848 WRIST ENDOSCOPY/SURGERY: CPT | Performed by: ORTHOPAEDIC SURGERY

## 2021-01-19 PROCEDURE — NC001 PR NO CHARGE: Performed by: ORTHOPAEDIC SURGERY

## 2021-01-19 PROCEDURE — 29848 WRIST ENDOSCOPY/SURGERY: CPT | Performed by: PHYSICIAN ASSISTANT

## 2021-01-19 RX ORDER — SENNOSIDES 8.6 MG
650 CAPSULE ORAL EVERY 8 HOURS PRN
Qty: 30 TABLET | Refills: 0 | Status: SHIPPED | OUTPATIENT
Start: 2021-01-19

## 2021-01-19 RX ORDER — MAGNESIUM HYDROXIDE 1200 MG/15ML
LIQUID ORAL AS NEEDED
Status: DISCONTINUED | OUTPATIENT
Start: 2021-01-19 | End: 2021-01-19 | Stop reason: HOSPADM

## 2021-01-19 RX ORDER — HYDROCODONE BITARTRATE AND ACETAMINOPHEN 5; 325 MG/1; MG/1
1 TABLET ORAL EVERY 6 HOURS PRN
Qty: 5 TABLET | Refills: 0 | Status: SHIPPED | OUTPATIENT
Start: 2021-01-19 | End: 2021-01-24

## 2021-01-19 RX ORDER — NAPROXEN SODIUM 220 MG
220 TABLET ORAL 2 TIMES DAILY WITH MEALS
Qty: 20 TABLET | Refills: 0 | Status: SHIPPED | OUTPATIENT
Start: 2021-01-19 | End: 2022-06-27 | Stop reason: ALTCHOICE

## 2021-01-19 RX ADMIN — SODIUM BICARBONATE: 84 INJECTION, SOLUTION INTRAVENOUS at 08:52

## 2021-01-19 NOTE — H&P
H&P Exam - Orthopedics   Tara Menendez 79 y o  male MRN: [id number]  Unit/Bed#: [facility] 19    Assessment/Plan   Assessment:  Right carpal tunnel syndrome  Plan:  Given failure of conservative management, patient will undergo right endoscopic carpal tunnel release under local medication    History of Present Illness   HPI:  Tara Menendez is a 79 y o  male who presents with right hand numbness and tingling with decreased sensation that failed conservative management for carpal tunnel release today      Historical Information  Review Of Systems:   · Skin: Normal  · Neuro: See HPI  · Musculoskeletal: See HPI  · 14 point review of systems negative except as stated above     Past Medical History:   Past Medical History:   Diagnosis Date    Arthritis     Basal cell carcinoma     Hypertension     Kidney stone     Skin cancer     Squamous cell carcinoma of skin        Past Surgical History:   Past Surgical History:   Procedure Laterality Date    KIDNEY STONE SURGERY  1982    MENISCECTOMY  2000    TONSILLECTOMY AND ADENOIDECTOMY  1940       Family History:  Family history reviewed and non-contributory  Family History   Problem Relation Age of Onset    Arthritis Family     Hypertension Family     Cancer Family     Breast cancer Mother     Heart disease Father        Social History:  Social History     Socioeconomic History    Marital status: /Civil Union     Spouse name: None    Number of children: 3    Years of education: None    Highest education level: None   Occupational History    Occupation: Retired   Social Needs    Financial resource strain: None    Food insecurity     Worry: None     Inability: None    Transportation needs     Medical: None     Non-medical: None   Tobacco Use    Smoking status: Never Smoker    Smokeless tobacco: Never Used   Substance and Sexual Activity    Alcohol use: Never     Frequency: Never    Drug use: Never    Sexual activity: None   Lifestyle    Physical activity     Days per week: None     Minutes per session: None    Stress: None   Relationships    Social connections     Talks on phone: None     Gets together: None     Attends Nondenominational service: None     Active member of club or organization: None     Attends meetings of clubs or organizations: None     Relationship status: None    Intimate partner violence     Fear of current or ex partner: None     Emotionally abused: None     Physically abused: None     Forced sexual activity: None   Other Topics Concern    None   Social History Narrative    None       Allergies: Allergies   Allergen Reactions    Sulfate            Labs:  0   Lab Value Date/Time    HCT 46 7 07/15/2019 0725    HGB 15 3 07/15/2019 0725    WBC 7 41 07/15/2019 0725       Meds:    Current Facility-Administered Medications:     lidocaine-epinephrine (XYLOCAINE/EPINEPHRINE) 1 %-1:100,000 20 mL, sodium bicarbonate 2 mEq infiltration, , Infiltration, Once, Vinod Moore MD    Blood Culture:   No results found for: BLOODCX    Wound Culture:   No results found for: WOUNDCULT    Ins and Outs:  No intake/output data recorded  Physical Exam  /88   Pulse (!) 113   Temp 97 8 °F (36 6 °C) (Tympanic)   Resp 18   Ht 5' 6" (1 676 m)   Wt 81 6 kg (180 lb)   SpO2 98%   BMI 29 05 kg/m²   /88   Pulse (!) 113   Temp 97 8 °F (36 6 °C) (Tympanic)   Resp 18   Ht 5' 6" (1 676 m)   Wt 81 6 kg (180 lb)   SpO2 98%   BMI 29 05 kg/m²   Gen: Alert and oriented to person, place, time  HEENT: EOMI, eyes clear, moist mucus membranes, hearing intact  Respiratory: Bilateral chest rise  No audible wheezing found  Cardiovascular: Regular Rate and Rhythm  Abdomen: soft nontender/nondistended  Ortho Exam:  Tenderness to palpation right wrist with positive Tinel's, carpal tunnel compression test and weakness abductor pollicis brevis  No evidence of fasciculations  Decreased sensation median nerve distribution    Neuro Exam:  2+ radial pulses    Lab Results: Reviewed  Imaging: Reviewed

## 2021-01-19 NOTE — OP NOTE
OPERATIVE REPORT  PATIENT NAME: Hesham Mullins  :  1940  MRN: [id number]  Pt Location: [facility]    SURGERY DATE: 21    Surgeon(s) and Role:     * Nilay Godoy MD - Primary     * Kenneth David PA-C - Assisting    Pre-Op Diagnosis:  Carpal tunnel syndrome of right wrist [G56 01]    Post-Op Diagnosis:   Carpal tunnel syndrome of right wrist [G56 01]    Procedure(s) (LRB):  RELEASE CARPAL TUNNEL ENDOSCOPIC (Right)    Specimen(s):  * No orders in the log *    Estimated Blood Loss:   Minimal      Anesthesia Type:   Local    Operative Indications: The patient has a history of Carpal Tunnel Syndrome  right that was recalcitrant to conservative management  The decision was made to bring the patient to the operating room for Endoscopic Carpal Tunnel Release  right  Risks of the procedure were explained which include, but are not limited to bleeding; infection; damage to nerves, arteries,veins, tendons; scar; pain; need for reoperation; failure to give desired result; and risks of anaesthesia  All questions were answered to satisfaction and they were willing to proceed  Operative Findings:  Right carpal tunnel    Complications:   None    Procedure and Technique:  After the patient, site, and procedure were identified, the patient was brought into the operating room in a supine position  Local anaesthesia was adminstered in the preoperative holding area  A tourniquet was not used  The  right upper extremity was then prepped and drapped in a normal, sterile, orthopedic fashion  After reconfirmation of the patient, site, and surgical procedure, which was agreed upon by the entire surgical team, attention was turned to the right wrist   The sites of the proximal and distal incisions were marked    The david of the proximal incision was placed horizontally at the midline of the wrist   The distal incision david was longitudinal extending distally from the point of intersection of the line between the long finger and ring finger and the line along the distal border of the fully abducted thumb  The proximal incision was performed  Subcutaneous tissues were dissected  Then the transverse volar antebrachial fascia was perforated with a scalpel  The edges of the skin incision where retracted and the forearm fascia was incised for approximately 1 5 cm proximally with care taken to identify and protect the median nerve  Retractors were used to inspect the transverse carpal ligament distally  A curved Ward dissector was used to glide under the transverse carpal ligament and superficial to the median nerve with confirmation via the washboard feeling  Then the curved Ward was pushed into the palm toward the distal incision site  When the location of the distal skin david was adequate, the distal incision was made  Then with retraction of the skin, further dissection and perforation of the palmar fascia was performed with the use of tenotomy scissors  The curved Ward was guided from proximal to distal out the distal incisions without any twisting to allow for dilation of the tract  The curved Ward was removed, and the cannula for the camera was inserted along the same tract, making sure to keep the alignment post on the cannula perpendicular to the plane of the hand without twisting  Then while keeping the wrist in extension, and holding the cannula of the camera in place, the wrist was placed on the hyperextension board  The scope was inserted distally, and a cotton-tip applicator was used proximally to clean the tract as well as the scope  A curved cutting knife was introduced from proximal to distal while keeping visualization with the use of the camera  Without twisting of the canula, the knife was used to cut the transverse carpal ligament completely, making sure there were no remnant fibers  Then after this was accomplished, the hand was removed from the extension block    Three maneuvers were used to confirm the full release of the transverse carpal ligament  First, the ease of twisting the trocar of the camera confirmed the release of the ligament  Second, the curved Ward was introduced to make sure there were no remnant fibers that could be felt palmarly  Third, the scope was introduced again to visualize that the whole ligament was released proximally to distally  Additional confirmation of full release included retraction and inspection in the distal and proximal incisions to make sure there were no remnant fibers distally or proximally respectively  At the completion of the procedure, hemostasis was obtained with cautery and direct pressure  The wounds were copiously irrigated with sterile solution  The wounds were closed with Prolene  Sterile dressings were applied, including Xeroform, gauze, tweeners, webril, ACE  Please note, all sponge, needle, and instrument counts were correct prior to closure  Loupe magnification was utilized  The patient tolerated the procedure well       I was present for all critical portions of the procedure, A qualified resident physician was not available and A physician assistant was required during the procedure for retraction tissue handling,dissection and suturing    Patient Disposition:  APU and hemodynamically stable    SIGNATURE: Milli Vázquez MD  DATE: 01/19/21  TIME: 9:49 AM

## 2021-01-25 ENCOUNTER — TELEPHONE (OUTPATIENT)
Dept: OBGYN CLINIC | Facility: HOSPITAL | Age: 81
End: 2021-01-25

## 2021-01-25 NOTE — TELEPHONE ENCOUNTER
Dr Vega   #: [phone]           Patient called in requesting a call back  He had sx 1/19 carpal tunnel R wrist  He has a couple of questions in regardless to his after care  Our call got disconnected  Tried calling back but no answer       Please advise,

## 2021-01-25 NOTE — TELEPHONE ENCOUNTER
Patient's call was returned but the message was unclear and he could not understand  Please call him back to address following p/o concerns  1) removed bandage; can he use soap and water? 2) has 2 incisions which are red around perimeter and at times painful with burning one on the palm when using  3) tingling in the tips of his fingers comes and goes  4) woke up this morning and wrist was numb but went away   Best contact [phone]    Thank you

## 2021-01-25 NOTE — TELEPHONE ENCOUNTER
Patient states that he took his bandages off and he has some reddened areas near incision, he has an intermittent burning  Still having tingling in fingers  Not taking any pain medication, tylenol or Naproxen anymore  Patient advised to ice and elevate above the heart  Naproxen BID for pain and swelling  Patient is sending some pictures to office email  Will forward to  and PA when they arrive  Patient has postop on 1/27

## 2021-01-27 ENCOUNTER — OFFICE VISIT (OUTPATIENT)
Dept: OBGYN CLINIC | Facility: HOSPITAL | Age: 81
End: 2021-01-27

## 2021-01-27 VITALS
WEIGHT: 182.6 LBS | DIASTOLIC BLOOD PRESSURE: 74 MMHG | HEIGHT: 66 IN | HEART RATE: 101 BPM | SYSTOLIC BLOOD PRESSURE: 168 MMHG | BODY MASS INDEX: 29.35 KG/M2

## 2021-01-27 DIAGNOSIS — G56.01 CARPAL TUNNEL SYNDROME ON RIGHT: ICD-10-CM

## 2021-01-27 DIAGNOSIS — Z47.89 AFTERCARE FOLLOWING SURGERY OF THE MUSCULOSKELETAL SYSTEM: Primary | ICD-10-CM

## 2021-01-27 PROCEDURE — 99024 POSTOP FOLLOW-UP VISIT: CPT | Performed by: ORTHOPAEDIC SURGERY

## 2021-01-27 NOTE — PROGRESS NOTES
Assessment:   S/P Release Carpal Tunnel Endoscopic - Right on 1/19/2021    Plan:   Resume activities as tolerated and scar tissue massage    Follow Up:  PRN      CHIEF COMPLAINT:  Chief Complaint   Patient presents with    Right Wrist - Post-op         SUBJECTIVE:  Ria Montes is a 79 y o  male who presents for follow up after Release Carpal Tunnel Endoscopic - Right on 1/19/2021  Today patient has some mild postoperative soreness  He does have some residual in tingling in the fingertips only  He states that otherwise, the numbness and tingling have improved 90%  He denies any signs of infection  He denies any other acute complaints  PHYSICAL EXAMINATION:  Vital signs: /74   Pulse 101   Ht 5' 6" (1 676 m)   Wt 82 8 kg (182 lb 9 6 oz)   BMI 29 47 kg/m²   General: well developed and well nourished, alert, oriented times 3 and appears comfortable  Psychiatric: Normal    MUSCULOSKELETAL EXAMINATION:  Incision: Clean, dry, intact and Patient has a small cut over the thenar muscle, about 1/4 inch in size  There is no surrounding erythema  There is no retained foreign body    Range of Motion: full composite fist possible  Neurovascular status: Neuro intact, good cap refill and Palmar cutaneous nerve intact  Activity Restrictions: No restrictions  Done today: Sutures out and Steri strips applied      STUDIES REVIEWED:  No Studies to review      PROCEDURES PERFORMED:  Procedures  No Procedures performed today

## 2021-02-04 DIAGNOSIS — I10 ESSENTIAL HYPERTENSION: ICD-10-CM

## 2021-02-04 RX ORDER — AMLODIPINE BESYLATE 5 MG/1
5 TABLET ORAL DAILY
Qty: 90 TABLET | Refills: 3 | Status: SHIPPED | OUTPATIENT
Start: 2021-02-04 | End: 2021-12-27

## 2021-02-08 ENCOUNTER — TELEPHONE (OUTPATIENT)
Dept: OBGYN CLINIC | Facility: HOSPITAL | Age: 81
End: 2021-02-08

## 2021-02-08 NOTE — TELEPHONE ENCOUNTER
Dr Marguerite Obregon    [phone]    Patient had surgery 3 weeks ago on his right hand    He still has a tingling feeling in that hand, is that normal?

## 2021-02-08 NOTE — TELEPHONE ENCOUNTER
Spoke to patient  He stated numbness and tingling has resolved  What he is concerned with now is a burning sensation he gets occasionally  Stated it is not all the time but it happens once in a while  Denies any redness, heat to touch, or swelling of the incision  Stated that the incision is pink but not red or irritated looking  Stated he gets pain once in a while if he pushes with the other hand  Advised that burning can be some nerve irritation due to surgery at the site of nerves  Advised that this should resolve over time  Advised him to call back if redness, heat to touch, or swelling occur  Patient verbalized understanding

## 2021-02-09 LAB
ALBUMIN SERPL-MCNC: 4.3 G/DL (ref 3.6–5.1)
ALBUMIN/GLOB SERPL: 1.5 (CALC) (ref 1–2.5)
ALP SERPL-CCNC: 58 U/L (ref 35–144)
ALT SERPL-CCNC: 19 U/L (ref 9–46)
AST SERPL-CCNC: 20 U/L (ref 10–35)
BASOPHILS # BLD AUTO: 118 CELLS/UL (ref 0–200)
BASOPHILS NFR BLD AUTO: 1.6 %
BILIRUB SERPL-MCNC: 0.8 MG/DL (ref 0.2–1.2)
BUN SERPL-MCNC: 19 MG/DL (ref 7–25)
BUN/CREAT SERPL: ABNORMAL (CALC) (ref 6–22)
CALCIUM SERPL-MCNC: 9.5 MG/DL (ref 8.6–10.3)
CHLORIDE SERPL-SCNC: 102 MMOL/L (ref 98–110)
CO2 SERPL-SCNC: 28 MMOL/L (ref 20–32)
CREAT SERPL-MCNC: 1.07 MG/DL (ref 0.7–1.11)
EOSINOPHIL # BLD AUTO: 311 CELLS/UL (ref 15–500)
EOSINOPHIL NFR BLD AUTO: 4.2 %
ERYTHROCYTE [DISTWIDTH] IN BLOOD BY AUTOMATED COUNT: 12.7 % (ref 11–15)
GLOBULIN SER CALC-MCNC: 2.8 G/DL (CALC) (ref 1.9–3.7)
GLUCOSE SERPL-MCNC: 112 MG/DL (ref 65–99)
HCT VFR BLD AUTO: 45.8 % (ref 38.5–50)
HGB BLD-MCNC: 15.1 G/DL (ref 13.2–17.1)
LYMPHOCYTES # BLD AUTO: 2087 CELLS/UL (ref 850–3900)
LYMPHOCYTES NFR BLD AUTO: 28.2 %
MCH RBC QN AUTO: 29.4 PG (ref 27–33)
MCHC RBC AUTO-ENTMCNC: 33 G/DL (ref 32–36)
MCV RBC AUTO: 89.3 FL (ref 80–100)
MONOCYTES # BLD AUTO: 770 CELLS/UL (ref 200–950)
MONOCYTES NFR BLD AUTO: 10.4 %
NEUTROPHILS # BLD AUTO: 4114 CELLS/UL (ref 1500–7800)
NEUTROPHILS NFR BLD AUTO: 55.6 %
PLATELET # BLD AUTO: 281 THOUSAND/UL (ref 140–400)
PMV BLD REES-ECKER: 11.3 FL (ref 7.5–12.5)
POTASSIUM SERPL-SCNC: 4.4 MMOL/L (ref 3.5–5.3)
PROT SERPL-MCNC: 7.1 G/DL (ref 6.1–8.1)
RBC # BLD AUTO: 5.13 MILLION/UL (ref 4.2–5.8)
SL AMB EGFR AFRICAN AMERICAN: 75 ML/MIN/1.73M2
SL AMB EGFR NON AFRICAN AMERICAN: 65 ML/MIN/1.73M2
SODIUM SERPL-SCNC: 137 MMOL/L (ref 135–146)
WBC # BLD AUTO: 7.4 THOUSAND/UL (ref 3.8–10.8)

## 2021-02-22 ENCOUNTER — TELEPHONE (OUTPATIENT)
Dept: OBGYN CLINIC | Facility: HOSPITAL | Age: 81
End: 2021-02-22

## 2021-02-22 NOTE — TELEPHONE ENCOUNTER
Patient had right carpal tunnel surgery over a month ago and is experiencing an increase in pain over time  Patient reports a burning sensation and states the pain is actually more severe than prior to the surgery  Please advise       Rashad Allen [phone]

## 2021-02-24 NOTE — TELEPHONE ENCOUNTER
Patient called back to schedule an appointment with Dr Lora Canada  [facility]  Patient was disappointed in the amount of time it would take to be seen  Patient is asking if the doctor can recommend any medication or if someone can call him back      Call back # [phone]

## 2021-02-25 NOTE — TELEPHONE ENCOUNTER
Patient has been advised that this can be normal  Denies any increased redness, heat to touch, or swelling along the incision  Stated the burning comes and goes   Did opt for a sooner appointment on Monday with Shivam Crawford in [place]

## 2021-02-25 NOTE — TELEPHONE ENCOUNTER
Patient called again, he is scheduled for 3/15 but concerned about the burning pain he's still having in his hand       CB # [id number]  Please call later in the day

## 2021-03-01 ENCOUNTER — OFFICE VISIT (OUTPATIENT)
Dept: OBGYN CLINIC | Facility: CLINIC | Age: 81
End: 2021-03-01

## 2021-03-01 VITALS
SYSTOLIC BLOOD PRESSURE: 152 MMHG | DIASTOLIC BLOOD PRESSURE: 73 MMHG | BODY MASS INDEX: 28.93 KG/M2 | WEIGHT: 180 LBS | HEIGHT: 66 IN

## 2021-03-01 DIAGNOSIS — Z47.89 AFTERCARE FOLLOWING SURGERY OF THE MUSCULOSKELETAL SYSTEM: Primary | ICD-10-CM

## 2021-03-01 PROCEDURE — 99024 POSTOP FOLLOW-UP VISIT: CPT | Performed by: PHYSICIAN ASSISTANT

## 2021-03-02 ENCOUNTER — OFFICE VISIT (OUTPATIENT)
Dept: INTERNAL MEDICINE CLINIC | Facility: CLINIC | Age: 81
End: 2021-03-02
Payer: MEDICARE

## 2021-03-02 VITALS
DIASTOLIC BLOOD PRESSURE: 78 MMHG | OXYGEN SATURATION: 97 % | WEIGHT: 181 LBS | TEMPERATURE: 98.1 F | BODY MASS INDEX: 29.09 KG/M2 | HEIGHT: 66 IN | SYSTOLIC BLOOD PRESSURE: 140 MMHG | HEART RATE: 80 BPM

## 2021-03-02 DIAGNOSIS — Z12.11 SCREENING FOR MALIGNANT NEOPLASM OF COLON: ICD-10-CM

## 2021-03-02 DIAGNOSIS — R73.01 IMPAIRED FASTING BLOOD SUGAR: ICD-10-CM

## 2021-03-02 DIAGNOSIS — E55.9 VITAMIN D DEFICIENCY, UNSPECIFIED: ICD-10-CM

## 2021-03-02 DIAGNOSIS — I10 ESSENTIAL HYPERTENSION: Primary | ICD-10-CM

## 2021-03-02 DIAGNOSIS — E78.2 MIXED HYPERLIPIDEMIA: ICD-10-CM

## 2021-03-02 PROCEDURE — 99214 OFFICE O/P EST MOD 30 MIN: CPT | Performed by: INTERNAL MEDICINE

## 2021-03-02 PROCEDURE — 1123F ACP DISCUSS/DSCN MKR DOCD: CPT | Performed by: INTERNAL MEDICINE

## 2021-03-02 RX ORDER — IRBESARTAN 150 MG/1
150 TABLET ORAL
Qty: 30 TABLET | Refills: 2 | Status: SHIPPED | OUTPATIENT
Start: 2021-03-02 | End: 2021-03-03 | Stop reason: SDUPTHER

## 2021-03-02 NOTE — PROGRESS NOTES
Assessment and Plan:     Problem List Items Addressed This Visit        Cardiovascular and Mediastinum    Hypertension - Primary    Relevant Medications    irbesartan (AVAPRO) 150 mg tablet    Other Relevant Orders    Comprehensive metabolic panel       Other    Mixed hyperlipidemia      Other Visit Diagnoses     Screening for malignant neoplasm of colon        Relevant Orders    Cologuard    Vitamin D deficiency, unspecified        Impaired fasting blood sugar        Relevant Orders    Hemoglobin A1C           Preventive health issues were discussed with patient, and age appropriate screening tests were ordered as noted in patient's After Visit Summary  Personalized health advice and appropriate referrals for health education or preventive services given if needed, as noted in patient's After Visit Summary       History of Present Illness:     Patient presents for Medicare Annual Wellness visit    Patient Care Team:  Sally Canada MD as PCP - General (Internal Medicine)  Jorge Vergara MD     Problem List:     Patient Active Problem List   Diagnosis    BPH associated with nocturia    Hypertension    Mixed hyperlipidemia    Lumbar stenosis with neurogenic claudication      Past Medical and Surgical History:     Past Medical History:   Diagnosis Date    Arthritis     Basal cell carcinoma     Hypertension     Kidney stone     Skin cancer     Squamous cell carcinoma of skin      Past Surgical History:   Procedure Laterality Date    KIDNEY STONE SURGERY  1982    MENISCECTOMY  2000    NM WRIST Janalyn Joana LIG Right 1/19/2021    Procedure: RELEASE CARPAL TUNNEL ENDOSCOPIC;  Surgeon: Nuvia Vargas MD;  Location: [facility];  Service: Orthopedics    TONSILLECTOMY AND ADENOIDECTOMY  1940      Family History:     Family History   Problem Relation Age of Onset    Arthritis Family     Hypertension Family     Cancer Family     Breast cancer Mother     Heart disease Father       Social History:        Social History     Socioeconomic History    Marital status: /Civil Union     Spouse name: None    Number of children: 3    Years of education: None    Highest education level: None   Occupational History    Occupation: Retired   Social Needs    Financial resource strain: None    Food insecurity     Worry: None     Inability: None    Transportation needs     Medical: None     Non-medical: None   Tobacco Use    Smoking status: Never Smoker    Smokeless tobacco: Never Used   Substance and Sexual Activity    Alcohol use: Never     Frequency: Never    Drug use: Never    Sexual activity: None   Lifestyle    Physical activity     Days per week: None     Minutes per session: None    Stress: None   Relationships    Social connections     Talks on phone: None     Gets together: None     Attends Christian service: None     Active member of club or organization: None     Attends meetings of clubs or organizations: None     Relationship status: None    Intimate partner violence     Fear of current or ex partner: None     Emotionally abused: None     Physically abused: None     Forced sexual activity: None   Other Topics Concern    None   Social History Narrative    None      Medications and Allergies:     Current Outpatient Medications   Medication Sig Dispense Refill    acetaminophen (TYLENOL) 650 mg CR tablet Take 1 tablet (650 mg total) by mouth every 8 (eight) hours as needed for mild pain 30 tablet 0    amLODIPine (NORVASC) 5 mg tablet Take 1 tablet (5 mg total) by mouth daily 90 tablet 3    Cholecalciferol (VITAMIN D3) 87643 units TABS Take 2,000 Units by mouth daily       Coenzyme Q10 (COQ10) 100 MG CAPS Take 200 mg by mouth daily      multivitamin (THERAGRAN) TABS Take 1 tablet by mouth daily      Omega-3 Fatty Acids (OMEGA-3 CF) 1000 MG CAPS Take 1 capsule by mouth daily      irbesartan (AVAPRO) 150 mg tablet Take 1 tablet (150 mg total) by mouth daily at bedtime 30 tablet 2    magnesium gluconate (MAGONATE) 500 mg tablet Take 1,000 mg by mouth daily      naproxen sodium (ALEVE) 220 MG tablet Take 1 tablet (220 mg total) by mouth 2 (two) times a day with meals (Patient not taking: Reported on 3/1/2021) 20 tablet 0     No current facility-administered medications for this visit  Allergies   Allergen Reactions    Sulfate       Immunizations:     Immunization History   Administered Date(s) Administered    INFLUENZA 10/30/2008    Influenza Injectable, MDCK, Preservative Free, Quadrivalent, 0 5 mL 09/16/2020    Influenza, seasonal, injectable 10/31/2011, 11/06/2013, 10/01/2015    Pneumococcal Conjugate 13-Valent 08/17/2015    Pneumococcal Polysaccharide PPV23 01/01/2008    Td (adult), adsorbed 03/01/1999    Tdap 08/16/2017, 11/05/2020    Zoster Vaccine Recombinant 02/23/2020, 06/22/2020      Health Maintenance:         Topic Date Due    Colonoscopy Surveillance  05/08/2020     There are no preventive care reminders to display for this patient  Medicare Health Risk Assessment:     /78 (BP Location: Left arm, Patient Position: Sitting, Cuff Size: Standard)   Pulse 80   Temp 98 1 °F (36 7 °C) (Temporal)   Ht 5' 6" (1 676 m)   Wt 82 1 kg (181 lb)   SpO2 97%   BMI 29 21 kg/m²      Alto Barefoot is here for his Subsequent Wellness visit  Last Medicare Wellness visit information reviewed, patient interviewed and updates made to the record today  Health Risk Assessment:   Patient rates overall health as very good  Patient feels that their physical health rating is same  Eyesight was rated as same  Hearing was rated as same  Patient feels that their emotional and mental health rating is slightly worse  Pain experienced in the last 7 days has been none  Patient states that he has experienced weight loss or gain in last 6 months  Depression Screening:   PHQ-2 Score: 0      Fall Risk Screening:    In the past year, patient has experienced: no history of falling in past year      Home Safety:  Patient does not have trouble with stairs inside or outside of their home  Patient has working smoke alarms and has no working carbon monoxide detector  Home safety hazards include: not having non-slip bath and/or shower mats  Nutrition:   Current diet is Regular  Medications:   Patient is currently taking over-the-counter supplements  OTC medications include: see medication list  Patient is able to manage medications  Activities of Daily Living (ADLs)/Instrumental Activities of Daily Living (IADLs):   Walk and transfer into and out of bed and chair?: Yes  Dress and groom yourself?: Yes    Bathe or shower yourself?: Yes    Feed yourself? Yes  Do your laundry/housekeeping?: Yes  Manage your money, pay your bills and track your expenses?: Yes  Make your own meals?: Yes    Do your own shopping?: Yes    Previous Hospitalizations:   Any hospitalizations or ED visits within the last 12 months?: No      Advance Care Planning:   Living will: No    Advanced directive: Yes    Five wishes given: Yes      PREVENTIVE SCREENINGS      Cardiovascular Screening:    General: Screening Not Indicated and History Lipid Disorder      Diabetes Screening:     General: Screening Current      Prostate Cancer Screening:    General: Screening Not Indicated      Lung Cancer Screening:     General: Screening Not Indicated    Other Counseling Topics:   Car/seat belt/driving safety, skin self-exam, sunscreen and calcium and vitamin D intake and regular weightbearing exercise         Antonietta Dennison MD

## 2021-03-02 NOTE — PATIENT INSTRUCTIONS

## 2021-03-02 NOTE — PROGRESS NOTES
Assessment/Plan:           1  Essential hypertension  -     Comprehensive metabolic panel; Future  -     irbesartan (AVAPRO) 150 mg tablet; Take 1 tablet (150 mg total) by mouth daily at bedtime    2  Screening for malignant neoplasm of colon  -     Cologuard; Future    3  Mixed hyperlipidemia    4  Vitamin D deficiency, unspecified    5  Impaired fasting blood sugar  -     Hemoglobin A1C; Future           1  Essential hypertension    - Comprehensive metabolic panel; Future  - irbesartan (AVAPRO) 150 mg tablet; Take 1 tablet (150 mg total) by mouth daily at bedtime  Dispense: 30 tablet; Refill: 2    2  Screening for malignant neoplasm of colon    - Cologuard; Future    3  Mixed hyperlipidemia      4  Vitamin D deficiency, unspecified      5  Impaired fasting blood sugar    - Hemoglobin A1C; Future           Subjective:      Patient ID: Pete Ordonez is a 79 y o  male  This is a 26-year-old gentleman with history of hypertension and spinal stenosis  He denies any chest pain or shortness of breath  The following portions of the patient's history were reviewed and updated as appropriate: He  has a past medical history of Arthritis, Basal cell carcinoma, Hypertension, Kidney stone, Skin cancer, and Squamous cell carcinoma of skin  He   Patient Active Problem List    Diagnosis Date Noted    Lumbar stenosis with neurogenic claudication     BPH associated with nocturia 12/14/2015    Hypertension 05/18/2012    Mixed hyperlipidemia 05/18/2012     He  has a past surgical history that includes Kidney stone surgery (1982); Meniscectomy (2000); Tonsillectomy and adenoidectomy (1940); and pr wrist arthroscop,release xvers lig (Right, 1/19/2021)  His family history includes Arthritis in his family; Breast cancer in his mother; Cancer in his family; Heart disease in his father; Hypertension in his family  He  reports that he has never smoked   He has never used smokeless tobacco  He reports that he does not drink alcohol or use drugs  Current Outpatient Medications   Medication Sig Dispense Refill    acetaminophen (TYLENOL) 650 mg CR tablet Take 1 tablet (650 mg total) by mouth every 8 (eight) hours as needed for mild pain 30 tablet 0    amLODIPine (NORVASC) 5 mg tablet Take 1 tablet (5 mg total) by mouth daily 90 tablet 3    Cholecalciferol (VITAMIN D3) 09964 units TABS Take 2,000 Units by mouth daily       Coenzyme Q10 (COQ10) 100 MG CAPS Take 200 mg by mouth daily      multivitamin (THERAGRAN) TABS Take 1 tablet by mouth daily      Omega-3 Fatty Acids (OMEGA-3 CF) 1000 MG CAPS Take 1 capsule by mouth daily      irbesartan (AVAPRO) 150 mg tablet Take 1 tablet (150 mg total) by mouth daily at bedtime 30 tablet 2    magnesium gluconate (MAGONATE) 500 mg tablet Take 1,000 mg by mouth daily      naproxen sodium (ALEVE) 220 MG tablet Take 1 tablet (220 mg total) by mouth 2 (two) times a day with meals (Patient not taking: Reported on 3/1/2021) 20 tablet 0     No current facility-administered medications for this visit        Current Outpatient Medications on File Prior to Visit   Medication Sig    acetaminophen (TYLENOL) 650 mg CR tablet Take 1 tablet (650 mg total) by mouth every 8 (eight) hours as needed for mild pain    amLODIPine (NORVASC) 5 mg tablet Take 1 tablet (5 mg total) by mouth daily    Cholecalciferol (VITAMIN D3) 39882 units TABS Take 2,000 Units by mouth daily     Coenzyme Q10 (COQ10) 100 MG CAPS Take 200 mg by mouth daily    multivitamin (THERAGRAN) TABS Take 1 tablet by mouth daily    Omega-3 Fatty Acids (OMEGA-3 CF) 1000 MG CAPS Take 1 capsule by mouth daily    [DISCONTINUED] irbesartan (AVAPRO) 75 mg tablet Take 1 tablet (75 mg total) by mouth daily    magnesium gluconate (MAGONATE) 500 mg tablet Take 1,000 mg by mouth daily    naproxen sodium (ALEVE) 220 MG tablet Take 1 tablet (220 mg total) by mouth 2 (two) times a day with meals (Patient not taking: Reported on 3/1/2021)     No current facility-administered medications on file prior to visit  He is allergic to sulfate       Review of Systems   Constitutional: Negative for appetite change, chills, fatigue and fever  HENT: Negative for sore throat and trouble swallowing  Eyes: Negative for redness  Respiratory: Negative for shortness of breath  Cardiovascular: Negative for chest pain and palpitations  Gastrointestinal: Negative for abdominal pain, constipation and diarrhea  Genitourinary: Negative for dysuria and hematuria  Musculoskeletal: Negative for back pain and neck pain  Skin: Negative for rash  Neurological: Negative for seizures, weakness and headaches  Hematological: Negative for adenopathy  Psychiatric/Behavioral: Negative for confusion  The patient is not nervous/anxious            Objective:      /78 (BP Location: Left arm, Patient Position: Sitting, Cuff Size: Standard)   Pulse 80   Temp 98 1 °F (36 7 °C) (Temporal)   Ht 5' 6" (1 676 m)   Wt 82 1 kg (181 lb)   SpO2 97%   BMI 29 21 kg/m²     Recent Results (from the past 1344 hour(s))   Comprehensive metabolic panel    Collection Time: 02/08/21  8:32 AM   Result Value Ref Range    Glucose, Random 112 (H) 65 - 99 mg/dL    BUN 19 7 - 25 mg/dL    Creatinine 1 07 0 70 - 1 11 mg/dL    eGFR Non  65 > OR = 60 mL/min/1 73m2    eGFR  75 > OR = 60 mL/min/1 73m2    SL AMB BUN/CREATININE RATIO NOT APPLICABLE 6 - 22 (calc)    Sodium 137 135 - 146 mmol/L    Potassium 4 4 3 5 - 5 3 mmol/L    Chloride 102 98 - 110 mmol/L    CO2 28 20 - 32 mmol/L    Calcium 9 5 8 6 - 10 3 mg/dL    Protein, Total 7 1 6 1 - 8 1 g/dL    Albumin 4 3 3 6 - 5 1 g/dL    Globulin 2 8 1 9 - 3 7 g/dL (calc)    Albumin/Globulin Ratio 1 5 1 0 - 2 5 (calc)    TOTAL BILIRUBIN 0 8 0 2 - 1 2 mg/dL    Alkaline Phosphatase 58 35 - 144 U/L    AST 20 10 - 35 U/L    ALT 19 9 - 46 U/L   CBC and differential    Collection Time: 02/08/21  8:32 AM   Result Value Ref Range White Blood Cell Count 7 4 3 8 - 10 8 Thousand/uL    Red Blood Cell Count 5 13 4 20 - 5 80 Million/uL    Hemoglobin 15 1 13 2 - 17 1 g/dL    HCT 45 8 38 5 - 50 0 %    MCV 89 3 80 0 - 100 0 fL    MCH 29 4 27 0 - 33 0 pg    MCHC 33 0 32 0 - 36 0 g/dL    RDW 12 7 11 0 - 15 0 %    Platelet Count 525 625 - 400 Thousand/uL    SL AMB MPV 11 3 7 5 - 12 5 fL    Neutrophils (Absolute) 4,114 1,500 - 7,800 cells/uL    Lymphocytes (Absolute) 2,087 850 - 3,900 cells/uL    Monocytes (Absolute) 770 200 - 950 cells/uL    Eosinophils (Absolute) 311 15 - 500 cells/uL    Basophils  0 - 200 cells/uL    Neutrophils 55 6 %    Lymphocytes 28 2 %    Monocytes 10 4 %    Eosinophils 4 2 %    Basophils PCT 1 6 %        Physical Exam  Constitutional:       General: He is not in acute distress  Appearance: Normal appearance  HENT:      Head: Normocephalic and atraumatic  Right Ear: Tympanic membrane normal       Left Ear: Tympanic membrane normal       Nose: Nose normal       Mouth/Throat:      Mouth: Mucous membranes are moist    Eyes:      Extraocular Movements: Extraocular movements intact  Pupils: Pupils are equal, round, and reactive to light  Neck:      Musculoskeletal: Normal range of motion and neck supple  Cardiovascular:      Rate and Rhythm: Normal rate and regular rhythm  Pulses: Normal pulses  Heart sounds: Normal heart sounds  No murmur  No friction rub  Pulmonary:      Effort: Pulmonary effort is normal  No respiratory distress  Breath sounds: Normal breath sounds  No wheezing  Abdominal:      General: Abdomen is flat  Bowel sounds are normal  There is no distension  Palpations: Abdomen is soft  There is no mass  Tenderness: There is no abdominal tenderness  There is no guarding  Musculoskeletal: Normal range of motion  Neurological:      General: No focal deficit present  Mental Status: He is alert and oriented to person, place, and time   Mental status is at baseline  Cranial Nerves: No cranial nerve deficit     Psychiatric:         Mood and Affect: Mood normal          Behavior: Behavior normal

## 2021-03-03 ENCOUNTER — EVALUATION (OUTPATIENT)
Dept: OCCUPATIONAL THERAPY | Age: 81
End: 2021-03-03
Payer: MEDICARE

## 2021-03-03 DIAGNOSIS — Z47.89 AFTERCARE FOLLOWING SURGERY OF THE MUSCULOSKELETAL SYSTEM: ICD-10-CM

## 2021-03-03 DIAGNOSIS — I10 ESSENTIAL HYPERTENSION: ICD-10-CM

## 2021-03-03 PROCEDURE — 97165 OT EVAL LOW COMPLEX 30 MIN: CPT

## 2021-03-03 PROCEDURE — 97110 THERAPEUTIC EXERCISES: CPT

## 2021-03-03 RX ORDER — IRBESARTAN 150 MG/1
150 TABLET ORAL
Qty: 90 TABLET | Refills: 0 | Status: SHIPPED | OUTPATIENT
Start: 2021-03-03 | End: 2021-03-15 | Stop reason: SDUPTHER

## 2021-03-03 NOTE — PROGRESS NOTES
Assessment:   S/P Release Carpal Tunnel Endoscopic - Right on 1/19/2021    Plan:   Therapy  -  Recommend that patient get formal therapy at this time    Follow Up:  Six weeks if needed    To Do Next Visit:   reexamine      CHIEF COMPLAINT:  Chief Complaint   Patient presents with    Right Hand - Pain, Post-op Problem     Occasional burning sensation          SUBJECTIVE:  [place] is a 79 y o  male who presents for follow up after Release Carpal Tunnel Endoscopic - Right on 1/19/2021  Today patient has  Some stiffness as well as intermittent burning sensation  He denies any signs infection  He denies any injuries  He denies any other acute complaints          PHYSICAL EXAMINATION:  Vital signs: /73   Ht 5' 6" (1 676 m)   Wt 81 6 kg (180 lb)   BMI 29 05 kg/m²   General: well developed and well nourished, alert, oriented times 3 and appears comfortable  Psychiatric: Normal    MUSCULOSKELETAL EXAMINATION:  Incision: Clean, dry, intact and Palpable scar tissue  Range of Motion: Limited due to stiffness  Neurovascular status: Neuro intact, good cap refill  Activity Restrictions: No restrictions         STUDIES REVIEWED:  No Studies to review      PROCEDURES PERFORMED:  Procedures  No Procedures performed today

## 2021-03-03 NOTE — PROGRESS NOTES
OT Evaluation     Today's date: 3/3/2021  Patient name: Aziza Shaver  : 1940  MRN: [id number]  Referring provider: Ruby Jha MD  Dx:   Encounter Diagnosis     ICD-10-CM    1  Aftercare following surgery of the musculoskeletal system  Z47 [id number] Ambulatory referral to PT/OT hand therapy                  Assessment  Assessment details: Barrera Andrade presents with complaints of pain with pressure into his palm, random burning sensations, and decreased hand strength  He reports some numbness around his incision and proximal palm  Recommend therapy 2x a week for 4-6 weeks to decrease scar density, increase strength, and increase overall function  Impairments: impaired physical strength, lacks appropriate home exercise program and pain with function    Goals  STG 1: Decrease overall pain to 3/10 in 4-6 weeks  STG 2: Increase overall strength by 15% in 4-6 weeks  STG 3: Compliant with HEP in 2 weeks  LTG 1: Complete all ADL/IADLs improved to prior level of function within 6-8 weeks  LTG 2: Leisure/social skills improved within 6-8 weeks  LTG 3: Work skills improved to prior level of function within 6-8 weeks    Patient Goal: to get rid of the burning    Goals, plan of care and treatment condition discussed with patient  Patient expresses their understanding and questions regarding these isues were addressed  Plan  Plan details: Scar management   and pinch strengthening  Patient would benefit from: OT eval, skilled occupational therapy and custom splinting  Planned modality interventions: thermotherapy: hydrocollator packs  Planned therapy interventions: joint mobilization, manual therapy, Fisher taping, home exercise program, graded exercise, graded activity, functional ROM exercises, therapeutic activities and therapeutic exercise  Frequency: 2x week  Treatment plan discussed with: patient        Subjective Evaluation    History of Present Illness  Onset date: 2 weeks prior    Date of surgery: 2021  Mechanism of injury: Numbness and tingling nocturnally prior to surgery  Now he reports burning sensation randomly as well as pain with pressure on the scar  He was given a silpos pad to be worn during the day and he reports this helps with the pain  Pain  Current pain ratin  At best pain ratin  At worst pain ratin  Quality: burning    Social Support    Employment status: not working (Retired )  Hand dominance: right          Objective     Active Range of Motion     Right Thumb   Opposition: Kapanji's 9    Additional Active Range of Motion Details  WFL composite fist    Strength/Myotome Testing     Left Wrist/Hand      (2nd hand position)     Trial 1: 40    Thumb Strength  Key/Lateral Pinch     Trial 1: 22  Tip/Two-Point Pinch     Trial 1: 12  Palmar/Three-Point Pinch     Trial 1: 18    Right Wrist/Hand   Wrist extension: 5  Wrist flexion: 5  Radial deviation: 5  Ulnar deviation: 5     (2nd hand position)     Trial 1: 27    Trial 2: 35    Thumb Strength   Key/Lateral Pinch     Trial 1: 16  Tip/Two-Point Pinch     Trial 1: 8  Palmar/Three-Point Pinch     Trial 1: 10    Tests     Left Wrist/Hand   Positive Phalen's sign                Precautions: Universal      Manuals 3/3            STM 8'            IASTM                                       Neuro Re-Ed                                                                                                        Ther Ex             Gross Grasp             Hook Roll             Hook fist into putty             Wrist strenthening                                                    HEP: Paper tape MNG, scar mgmt reviewed            Ther Activity             Translation             Pinch Pins             Gait Training                                       Modalities

## 2021-03-04 ENCOUNTER — IMMUNIZATIONS (OUTPATIENT)
Dept: FAMILY MEDICINE CLINIC | Facility: HOSPITAL | Age: 81
End: 2021-03-04

## 2021-03-04 DIAGNOSIS — Z23 ENCOUNTER FOR IMMUNIZATION: Primary | ICD-10-CM

## 2021-03-04 PROCEDURE — 91300 SARS-COV-2 / COVID-19 MRNA VACCINE (PFIZER-BIONTECH) 30 MCG: CPT

## 2021-03-04 PROCEDURE — 0001A SARS-COV-2 / COVID-19 MRNA VACCINE (PFIZER-BIONTECH) 30 MCG: CPT

## 2021-03-08 ENCOUNTER — OFFICE VISIT (OUTPATIENT)
Dept: OCCUPATIONAL THERAPY | Age: 81
End: 2021-03-08
Payer: MEDICARE

## 2021-03-08 DIAGNOSIS — Z47.89 AFTERCARE FOLLOWING SURGERY OF THE MUSCULOSKELETAL SYSTEM: Primary | ICD-10-CM

## 2021-03-08 PROCEDURE — 97140 MANUAL THERAPY 1/> REGIONS: CPT

## 2021-03-08 PROCEDURE — 97110 THERAPEUTIC EXERCISES: CPT

## 2021-03-08 PROCEDURE — 97530 THERAPEUTIC ACTIVITIES: CPT

## 2021-03-08 NOTE — PROGRESS NOTES
Daily Note     Today's date: 3/8/2021  Patient name: Kiera Vogel  : 1940  MRN: [id number]  Referring provider: Deborah Person MD  Dx:   Encounter Diagnosis     ICD-10-CM    1  Aftercare following surgery of the musculoskeletal system  Z47 89                   Subjective: The burning sensation has gone away      Objective: See treatment diary below      Assessment: Tolerated treatment well  Patient would benefit from continued OT  Decreasing scar density, [facility] continues to be difficult      Plan: Continue per plan of care        Precautions: Universal      Manuals 3/3 3/8           STM 8' 10'           IASTM                                       Neuro Re-Ed                                                                                                        Ther Ex             Gross Grasp  RPW 3x10           Hook [employer], 3x10           Hook fist into putty  YTP 3x10           Wrist strenthening  RFB 2x10 all planes                                                  HEP: Paper tape MNG, scar mgmt reviewed            Ther Activity             Translation  Colored Pegs 30x           Pinch Pins  R/R 2x           Gait Training                                       Modalities

## 2021-03-11 ENCOUNTER — OFFICE VISIT (OUTPATIENT)
Dept: OCCUPATIONAL THERAPY | Age: 81
End: 2021-03-11
Payer: MEDICARE

## 2021-03-11 DIAGNOSIS — Z47.89 AFTERCARE FOLLOWING SURGERY OF THE MUSCULOSKELETAL SYSTEM: Primary | ICD-10-CM

## 2021-03-11 PROCEDURE — 97140 MANUAL THERAPY 1/> REGIONS: CPT

## 2021-03-11 PROCEDURE — 97110 THERAPEUTIC EXERCISES: CPT

## 2021-03-11 NOTE — PROGRESS NOTES
Daily Note     Today's date: 3/11/2021  Patient name: Devora Hughes  : 1940  MRN: [id number]  Referring provider: Kirsten Montoya MD  Dx:   Encounter Diagnosis     ICD-10-CM    1  Aftercare following surgery of the musculoskeletal system  Z47 89                   Subjective: The burning sensation has gone away      Objective: See treatment diary below      Assessment: Tolerated treatment well  Patient would benefit from continued OT  Decreasing scar density, [facility] continues to be difficult      Plan: Continue per plan of care        Precautions: Universal      Manuals 3/3 3/8           STM 8' 10'           IASTM                                       Neuro Re-Ed                                                                                                        Ther Ex             Gross Grasp  RPW 3x10           Hook [employer], 3x10           Hook fist into putty  YTP 3x10           Wrist strenthening  RFB 2x10 all planes                                                  HEP: Paper tape MNG, scar mgmt reviewed            Ther Activity             Translation  Colored Pegs 30x           Pinch Pins  R/R 2x           Gait Training                                       Modalities

## 2021-03-15 ENCOUNTER — OFFICE VISIT (OUTPATIENT)
Dept: OCCUPATIONAL THERAPY | Age: 81
End: 2021-03-15
Payer: MEDICARE

## 2021-03-15 DIAGNOSIS — Z47.89 AFTERCARE FOLLOWING SURGERY OF THE MUSCULOSKELETAL SYSTEM: Primary | ICD-10-CM

## 2021-03-15 DIAGNOSIS — I10 ESSENTIAL HYPERTENSION: ICD-10-CM

## 2021-03-15 PROCEDURE — 97140 MANUAL THERAPY 1/> REGIONS: CPT

## 2021-03-15 PROCEDURE — 97110 THERAPEUTIC EXERCISES: CPT

## 2021-03-15 RX ORDER — IRBESARTAN 150 MG/1
150 TABLET ORAL
Qty: 90 TABLET | Refills: 0 | Status: SHIPPED | OUTPATIENT
Start: 2021-03-15 | End: 2021-03-17 | Stop reason: SDUPTHER

## 2021-03-15 NOTE — PROGRESS NOTES
Daily Note     Today's date: 3/15/2021  Patient name: Deborah Guaman  : 1940  MRN: [id number]  Referring provider: Finn Hernandez MD  Dx:   Encounter Diagnosis     ICD-10-CM    1  Aftercare following surgery of the musculoskeletal system  Z47 89                   Subjective: It's just been pretty achy  Do you think I need to wear that gel pad all the time? Objective: See treatment diary below      Assessment: Tolerated treatment well  Patient would benefit from continued OT  Scar density greatly improving, upgraded PREs today with good tolerance  Plan: Continue per plan of care        Precautions: Universal      Manuals 3/3 3/8 3/15          STM 8' 10' 10'          IAS                                       Neuro Re-Ed                                                                                                        Ther Ex             Gross Grasp  RPW 3x10 GPW 3x10          Hook Roll  highlighter, 3x10 Pen 3x10          Hook fist into putty  YTP 3x10 RTP 3x10          Wrist strenthening  RFB 2x10 all planes RFB 2x10 a                                                 HEP: Paper tape MNG, scar mgmt reviewed            Ther Activity             Translation  Colored Pegs 30x           Pinch Pins  R/R 2x R/G 2x          Gait Training                                       Modalities

## 2021-03-17 DIAGNOSIS — I10 ESSENTIAL HYPERTENSION: ICD-10-CM

## 2021-03-18 ENCOUNTER — OFFICE VISIT (OUTPATIENT)
Dept: OCCUPATIONAL THERAPY | Age: 81
End: 2021-03-18
Payer: MEDICARE

## 2021-03-18 DIAGNOSIS — Z47.89 AFTERCARE FOLLOWING SURGERY OF THE MUSCULOSKELETAL SYSTEM: Primary | ICD-10-CM

## 2021-03-18 PROCEDURE — 97110 THERAPEUTIC EXERCISES: CPT

## 2021-03-18 PROCEDURE — 97140 MANUAL THERAPY 1/> REGIONS: CPT

## 2021-03-18 NOTE — PROGRESS NOTES
Daily Note     Today's date: 3/18/2021  Patient name: Deborah Guaman  : 1940  MRN: [id number]  Referring provider: Finn Hernandez MD  Dx:   Encounter Diagnosis     ICD-10-CM    1  Aftercare following surgery of the musculoskeletal system  Z47 89                   Subjective: It has it's good times and bad times        Objective: See treatment diary below      Assessment: Tolerated treatment well  Patient would benefit from continued OT  Scar density greatly improving, upgraded PREs today with good tolerance  Plan: Continue per plan of care    2 visits then RE with possible D/C     Precautions: Universal      Manuals 3/3 3/8 3/15 3/18         STM 8' 10' 10' 10'         IAS                                       Neuro Re-Ed                                                                                                        Ther Ex             Gross Grasp  RPW 3x10 GPW 3x10 BPW 3x10         Hook Roll  highlighter, 3x10 Pen 3x10 Pen 3x10         Hook fist into putty  YTP 3x10 RTP 3x10 RTP 3x10         Wrist strenthening  RFB 2x10 all planes RFB 2x10 a RFB 2x10                                                HEP: Paper tape MNG, scar mgmt reviewed            Ther Activity             Translation  Colored Pegs 30x  Colored pegs 30x, 1 marble 15x, 2 marbles 15x         Pinch Pins  R/R 2x R/G 2x G/G 2x         Gait Training                                       Modalities

## 2021-03-19 RX ORDER — IRBESARTAN 150 MG/1
150 TABLET ORAL
Qty: 90 TABLET | Refills: 0 | Status: SHIPPED | OUTPATIENT
Start: 2021-03-19 | End: 2021-04-14 | Stop reason: SDUPTHER

## 2021-03-22 ENCOUNTER — OFFICE VISIT (OUTPATIENT)
Dept: OCCUPATIONAL THERAPY | Age: 81
End: 2021-03-22
Payer: MEDICARE

## 2021-03-22 DIAGNOSIS — Z47.89 AFTERCARE FOLLOWING SURGERY OF THE MUSCULOSKELETAL SYSTEM: Primary | ICD-10-CM

## 2021-03-22 PROCEDURE — 97110 THERAPEUTIC EXERCISES: CPT

## 2021-03-22 PROCEDURE — 97530 THERAPEUTIC ACTIVITIES: CPT

## 2021-03-22 PROCEDURE — 97140 MANUAL THERAPY 1/> REGIONS: CPT

## 2021-03-22 NOTE — PROGRESS NOTES
Daily Note     Today's date: 3/22/2021  Patient name: Hesham Mullins  : 1940  MRN: [id number]  Referring provider: Ari Kaufman MD  Dx:   Encounter Diagnosis     ICD-10-CM    1  Aftercare following surgery of the musculoskeletal system  Z47 89                   Subjective: It's really been much better        Objective: See treatment diary below      Assessment: Tolerated treatment well  Patient would benefit from continued OT  Continued to increase PREs with good tolerance  RE with D/C NV      Plan: Continue per plan of care    D/C to HEP NV    Precautions: Universal      Manuals 3/3 3/8 3/15 3/18 3/22        STM 8' 10' 10' 10' 10'        IASTM                                       Neuro Re-Ed                                                                                                        Ther Ex             Gross Grasp  RPW 3x10 GPW 3x10 BPW 3x10 BPW 3x10        Hook Roll  highlighter, 3x10 Pen 3x10 Pen 3x10         Hook fist into putty  YTP 3x10 RTP 3x10 RTP 3x10         Wrist strenthening  RFB 2x10 all planes RFB 2x10 a RFB 2x10 GFB 2x10                                               HEP: Paper tape MNG, scar mgmt reviewed            Ther Activity             Translation  Colored Pegs 30x  Colored pegs 30x, 1 marble 15x, 2 marbles 15x Colored pegs with 2 marbles 30x        Pinch Pins  R/R 2x R/G 2x G/G 2x G/G 2x        Gait Training                                       Modalities

## 2021-03-25 ENCOUNTER — OFFICE VISIT (OUTPATIENT)
Dept: OCCUPATIONAL THERAPY | Age: 81
End: 2021-03-25
Payer: MEDICARE

## 2021-03-25 DIAGNOSIS — Z47.89 AFTERCARE FOLLOWING SURGERY OF THE MUSCULOSKELETAL SYSTEM: Primary | ICD-10-CM

## 2021-03-25 PROCEDURE — 97140 MANUAL THERAPY 1/> REGIONS: CPT

## 2021-03-25 PROCEDURE — 97110 THERAPEUTIC EXERCISES: CPT

## 2021-03-25 NOTE — PROGRESS NOTES
OT Re-Evaluation  and OT Discharge     Today's date: 3/25/2021  Patient name: Pollo Lopez  : 1940  MRN: [id number]  Referring provider: Gwenn Kayser, MD  Dx:   Encounter Diagnosis     ICD-10-CM    1  Aftercare following surgery of the musculoskeletal system  Z47 89                   Assessment  Assessment details: Tato Davidson presents with complaints of pain with pressure into his palm, random burning sensations, and decreased hand strength  He reports some numbness around his incision and proximal palm  Recommend therapy 2x a week for 4-6 weeks to decrease scar density, increase strength, and increase overall function    3/25: Tato Davidson presents with increased strength and decreased overall pain and discomfort  Recommend D/C to HEP  Impairments: impaired physical strength, lacks appropriate home exercise program and pain with function    Goals  STG 1: Decrease overall pain to 3/10 in 4-6 weeks  NOT MET  STG 2: Increase overall strength by 15% in 4-6 weeks  MET  STG 3: Compliant with HEP in 2 weeks  MET    LTG 1: Complete all ADL/IADLs improved to prior level of function within 6-8 weeks MET  LTG 2: Leisure/social skills improved within 6-8 weeks MET  LTG 3: Work skills improved to prior level of function within 6-8 weeks MET    Patient Goal: to get rid of the burning    Goals, plan of care and treatment condition discussed with patient  Patient expresses their understanding and questions regarding these isues were addressed        Plan  Plan details: Scar management   and pinch strengthening  Patient would benefit from: OT eval, skilled occupational therapy and custom splinting  Planned modality interventions: thermotherapy: hydrocollator packs  Planned therapy interventions: joint mobilization, manual therapy, Fisher taping, home exercise program, graded exercise, graded activity, functional ROM exercises, therapeutic activities and therapeutic exercise  Frequency: 2x week  Treatment plan discussed with: patient        Subjective Evaluation    History of Present Illness  Onset date: 2 weeks prior  Date of surgery: 2021  Mechanism of injury: Numbness and tingling nocturnally prior to surgery  Now he reports burning sensation randomly as well as pain with pressure on the scar  He was given a silpos pad to be worn during the day and he reports this helps with the pain    Pain  Current pain ratin  At best pain ratin  At worst pain ratin  Quality: burning    Social Support    Employment status: not working (Retired )  Hand dominance: right          Objective     Active Range of Motion     Right Thumb   Opposition: Kapanji's 9    Additional Active Range of Motion Details  WFL composite fist    Strength/Myotome Testing     Left Wrist/Hand      (2nd hand position)     Trial 1: 47    Thumb Strength  Key/Lateral Pinch     Trial 1: 22  Tip/Two-Point Pinch     Trial 1: 12  Palmar/Three-Point Pinch     Trial 1: 16    Right Wrist/Hand   Wrist extension: 5  Wrist flexion: 5  Radial deviation: 5  Ulnar deviation: 5     (2nd hand position)     Trial 1: 35    Trial 2: 35    Thumb Strength   Key/Lateral Pinch     Trial 1: 17  Tip/Two-Point Pinch     Trial 1: 8  Palmar/Three-Point Pinch     Trial 1: 12

## 2021-03-26 ENCOUNTER — IMMUNIZATIONS (OUTPATIENT)
Dept: FAMILY MEDICINE CLINIC | Facility: HOSPITAL | Age: 81
End: 2021-03-26

## 2021-03-26 DIAGNOSIS — Z23 ENCOUNTER FOR IMMUNIZATION: Primary | ICD-10-CM

## 2021-03-26 PROCEDURE — 0002A SARS-COV-2 / COVID-19 MRNA VACCINE (PFIZER-BIONTECH) 30 MCG: CPT

## 2021-03-26 PROCEDURE — 91300 SARS-COV-2 / COVID-19 MRNA VACCINE (PFIZER-BIONTECH) 30 MCG: CPT

## 2021-03-29 ENCOUNTER — APPOINTMENT (OUTPATIENT)
Dept: OCCUPATIONAL THERAPY | Age: 81
End: 2021-03-29
Payer: MEDICARE

## 2021-04-12 ENCOUNTER — APPOINTMENT (OUTPATIENT)
Dept: LAB | Age: 81
End: 2021-04-12
Payer: MEDICARE

## 2021-04-12 DIAGNOSIS — I10 ESSENTIAL HYPERTENSION: ICD-10-CM

## 2021-04-12 DIAGNOSIS — R73.01 IMPAIRED FASTING BLOOD SUGAR: ICD-10-CM

## 2021-04-12 LAB
ALBUMIN SERPL BCP-MCNC: 3.4 G/DL (ref 3.5–5)
ALP SERPL-CCNC: 67 U/L (ref 46–116)
ALT SERPL W P-5'-P-CCNC: 33 U/L (ref 12–78)
ANION GAP SERPL CALCULATED.3IONS-SCNC: 5 MMOL/L (ref 4–13)
AST SERPL W P-5'-P-CCNC: 21 U/L (ref 5–45)
BILIRUB SERPL-MCNC: 0.84 MG/DL (ref 0.2–1)
BUN SERPL-MCNC: 20 MG/DL (ref 5–25)
CALCIUM ALBUM COR SERPL-MCNC: 9.7 MG/DL (ref 8.3–10.1)
CALCIUM SERPL-MCNC: 9.2 MG/DL (ref 8.3–10.1)
CHLORIDE SERPL-SCNC: 107 MMOL/L (ref 100–108)
CO2 SERPL-SCNC: 27 MMOL/L (ref 21–32)
CREAT SERPL-MCNC: 1.11 MG/DL (ref 0.6–1.3)
EST. AVERAGE GLUCOSE BLD GHB EST-MCNC: 123 MG/DL
GFR SERPL CREATININE-BSD FRML MDRD: 62 ML/MIN/1.73SQ M
GLUCOSE P FAST SERPL-MCNC: 111 MG/DL (ref 65–99)
HBA1C MFR BLD: 5.9 %
POTASSIUM SERPL-SCNC: 4 MMOL/L (ref 3.5–5.3)
PROT SERPL-MCNC: 7.5 G/DL (ref 6.4–8.2)
SODIUM SERPL-SCNC: 139 MMOL/L (ref 136–145)

## 2021-04-12 PROCEDURE — 80053 COMPREHEN METABOLIC PANEL: CPT

## 2021-04-12 PROCEDURE — 36415 COLL VENOUS BLD VENIPUNCTURE: CPT

## 2021-04-12 PROCEDURE — 83036 HEMOGLOBIN GLYCOSYLATED A1C: CPT

## 2021-04-14 ENCOUNTER — OFFICE VISIT (OUTPATIENT)
Dept: INTERNAL MEDICINE CLINIC | Facility: CLINIC | Age: 81
End: 2021-04-14
Payer: MEDICARE

## 2021-04-14 VITALS
HEIGHT: 66 IN | TEMPERATURE: 97.4 F | SYSTOLIC BLOOD PRESSURE: 125 MMHG | BODY MASS INDEX: 29.25 KG/M2 | WEIGHT: 182 LBS | HEART RATE: 85 BPM | OXYGEN SATURATION: 97 % | DIASTOLIC BLOOD PRESSURE: 70 MMHG

## 2021-04-14 DIAGNOSIS — E78.2 MIXED HYPERLIPIDEMIA: ICD-10-CM

## 2021-04-14 DIAGNOSIS — R73.01 IMPAIRED FASTING BLOOD SUGAR: ICD-10-CM

## 2021-04-14 DIAGNOSIS — I10 WHITE COAT SYNDROME WITH DIAGNOSIS OF HYPERTENSION: ICD-10-CM

## 2021-04-14 DIAGNOSIS — E55.9 VITAMIN D DEFICIENCY, UNSPECIFIED: ICD-10-CM

## 2021-04-14 DIAGNOSIS — I10 ESSENTIAL HYPERTENSION: Primary | ICD-10-CM

## 2021-04-14 DIAGNOSIS — Z12.11 SCREEN FOR COLON CANCER: ICD-10-CM

## 2021-04-14 DIAGNOSIS — I65.23 ATHEROSCLEROSIS OF BOTH CAROTID ARTERIES: ICD-10-CM

## 2021-04-14 DIAGNOSIS — I73.9 PVD (PERIPHERAL VASCULAR DISEASE) (HCC): ICD-10-CM

## 2021-04-14 PROCEDURE — 99214 OFFICE O/P EST MOD 30 MIN: CPT | Performed by: INTERNAL MEDICINE

## 2021-04-14 RX ORDER — IRBESARTAN 150 MG/1
150 TABLET ORAL
Qty: 90 TABLET | Refills: 1 | Status: SHIPPED | OUTPATIENT
Start: 2021-04-14 | End: 2021-09-28

## 2021-04-14 RX ORDER — IRBESARTAN 150 MG/1
150 TABLET ORAL
Qty: 90 TABLET | Refills: 1 | Status: SHIPPED | OUTPATIENT
Start: 2021-04-14 | End: 2021-04-14

## 2021-04-14 NOTE — PROGRESS NOTES
Assessment/Plan: This is 18-year-old gentleman with a history of hypertension   Whose blood pressure is now well controlled on amlodipine and irbesartan  Blood pressure from home was reviewed and blood pressure in the office was also within normal limits  1  Essential hypertension  Comments:    Blood pressures improved but he does have an element of white coat hypertension continue the current regimen of amlodipine and irbesartan  Orders:  -     irbesartan (AVAPRO) 150 mg tablet; Take 1 tablet (150 mg total) by mouth daily at bedtime    2  Screen for colon cancer  -     Fecal Globin By [employer]  (Medicare); Future  -     Cologuard; Future    3  Mixed hyperlipidemia    4  Vitamin D deficiency, unspecified    5  Impaired fasting blood sugar  Comments:    Labs were discussed and this will be monitored  6  White coat syndrome with diagnosis of hypertension    7  PVD (peripheral vascular disease) (HCC)  -     VAS carotid complete study; Future; Expected date: 04/14/2021    8  Atherosclerosis of both carotid arteries  -     VAS carotid complete study; Future; Expected date: 04/14/2021           1  Essential hypertension      2  Screen for colon cancer    - Cologuard; Future    3  Mixed hyperlipidemia             Subjective:      Patient ID: Deyanira Courtney is a 79 y o  male  This is 18-year-old gentleman with a history of hypertension hyperlipidemia BPH and lumbar spondylosis  He denies any chest pain or shortness of breath  The following portions of the patient's history were reviewed and updated as appropriate: He  has a past medical history of Arthritis, Basal cell carcinoma, Hypertension, Kidney stone, Skin cancer, and Squamous cell carcinoma of skin    He   Patient Active Problem List    Diagnosis Date Noted    PVD [facility]peripheral vascular disease) ([facility] [address] ) 04/14/2021    Lumbar stenosis with neurogenic claudication     BPH associated with nocturia 12/14/2015    White coat syndrome with diagnosis of hypertension 05/18/2012    Mixed hyperlipidemia 05/18/2012     He  has a past surgical history that includes Kidney stone surgery (1982); Meniscectomy (2000); Tonsillectomy and adenoidectomy (1940); and pr wrist arthroscop,release xvers lig (Right, 1/19/2021)  His family history includes Arthritis in his family; Breast cancer in his mother; Cancer in his family; Heart disease in his father; Hypertension in his family  He  reports that he has never smoked  He has never used smokeless tobacco  He reports that he does not drink alcohol or use drugs  Current Outpatient Medications   Medication Sig Dispense Refill    amLODIPine (NORVASC) 5 mg tablet Take 1 tablet (5 mg total) by mouth daily 90 tablet 3    Cholecalciferol (VITAMIN D3) 28868 units TABS Take 2,000 Units by mouth daily       Coenzyme Q10 (COQ10) 100 MG CAPS Take 200 mg by mouth daily      irbesartan (AVAPRO) 150 mg tablet Take 1 tablet (150 mg total) by mouth daily at bedtime 90 tablet 1    multivitamin (THERAGRAN) TABS Take 1 tablet by mouth daily      Omega-3 Fatty Acids (OMEGA-3 CF) 1000 MG CAPS Take 1 capsule by mouth daily      acetaminophen (TYLENOL) 650 mg CR tablet Take 1 tablet (650 mg total) by mouth every 8 (eight) hours as needed for mild pain (Patient not taking: Reported on 4/14/2021) 30 tablet 0    magnesium gluconate (MAGONATE) 500 mg tablet Take 1,000 mg by mouth daily      naproxen sodium (ALEVE) 220 MG tablet Take 1 tablet (220 mg total) by mouth 2 (two) times a day with meals (Patient not taking: Reported on 3/1/2021) 20 tablet 0     No current facility-administered medications for this visit        Current Outpatient Medications on File Prior to Visit   Medication Sig    amLODIPine (NORVASC) 5 mg tablet Take 1 tablet (5 mg total) by mouth daily    Cholecalciferol (VITAMIN D3) 41827 units TABS Take 2,000 Units by mouth daily     Coenzyme Q10 (COQ10) 100 MG CAPS Take 200 mg by mouth daily    multivitamin (THERAGRAN) TABS Take 1 tablet by mouth daily    Omega-3 Fatty Acids (OMEGA-3 CF) 1000 MG CAPS Take 1 capsule by mouth daily    [DISCONTINUED] irbesartan (AVAPRO) 150 mg tablet Take 1 tablet (150 mg total) by mouth daily at bedtime    acetaminophen (TYLENOL) 650 mg CR tablet Take 1 tablet (650 mg total) by mouth every 8 (eight) hours as needed for mild pain (Patient not taking: Reported on 4/14/2021)    magnesium gluconate (MAGONATE) 500 mg tablet Take 1,000 mg by mouth daily    naproxen sodium (ALEVE) 220 MG tablet Take 1 tablet (220 mg total) by mouth 2 (two) times a day with meals (Patient not taking: Reported on 3/1/2021)     No current facility-administered medications on file prior to visit  He is allergic to sulfate       Review of Systems   Constitutional: Negative for appetite change, chills, fatigue and fever  HENT: Negative for sore throat and trouble swallowing  Eyes: Negative for redness  Respiratory: Negative for shortness of breath  Cardiovascular: Negative for chest pain and palpitations  Gastrointestinal: Negative for abdominal pain, constipation and diarrhea  Genitourinary: Negative for dysuria and hematuria  Musculoskeletal: Negative for back pain and neck pain  Skin: Negative for rash  Neurological: Negative for seizures, weakness and headaches  Hematological: Negative for adenopathy  Psychiatric/Behavioral: Negative for confusion  The patient is not nervous/anxious  Objective:      /70 (BP Location: Right arm)   Pulse 85   Temp (!) 97 4 °F (36 3 °C) (Temporal)   Ht 5' 6" (1 676 m)   Wt 82 6 kg (182 lb)   SpO2 97%   BMI 29 38 kg/m²     Recent Results (from the past 1344 hour(s))   Hemoglobin A1C    Collection Time: 04/12/21  8:23 AM   Result Value Ref Range    Hemoglobin A1C 5 9 (H) Normal 3 8-5 6%; PreDiabetic 5 7-6 4%;  Diabetic >=6 5%; Glycemic control for adults with diabetes <7 0% %     mg/dl   Comprehensive metabolic panel Collection Time: 04/12/21  8:23 AM   Result Value Ref Range    Sodium 139 136 - 145 mmol/L    Potassium 4 0 3 5 - 5 3 mmol/L    Chloride 107 100 - 108 mmol/L    CO2 27 21 - 32 mmol/L    ANION GAP 5 4 - 13 mmol/L    BUN 20 5 - 25 mg/dL    Creatinine 1 11 0 60 - 1 30 mg/dL    Glucose, Fasting 111 (H) 65 - 99 mg/dL    Calcium 9 2 8 3 - 10 1 mg/dL    Corrected Calcium 9 7 8 3 - 10 1 mg/dL    AST 21 5 - 45 U/L    ALT 33 12 - 78 U/L    Alkaline Phosphatase 67 46 - 116 U/L    Total Protein 7 5 6 4 - 8 2 g/dL    Albumin 3 4 (L) 3 5 - 5 0 g/dL    Total Bilirubin 0 84 0 20 - 1 00 mg/dL    eGFR 62 ml/min/1 73sq m        Physical Exam  Constitutional:       General: He is not in acute distress  Appearance: Normal appearance  HENT:      Head: Normocephalic and atraumatic  Nose: Nose normal       Mouth/Throat:      Mouth: Mucous membranes are moist    Eyes:      Extraocular Movements: Extraocular movements intact  Pupils: Pupils are equal, round, and reactive to light  Cardiovascular:      Rate and Rhythm: Normal rate and regular rhythm  Pulses: Normal pulses  Heart sounds: Normal heart sounds  No murmur  No friction rub  Pulmonary:      Effort: Pulmonary effort is normal  No respiratory distress  Breath sounds: Normal breath sounds  No wheezing  Abdominal:      General: Abdomen is flat  Bowel sounds are normal  There is no distension  Palpations: Abdomen is soft  There is no mass  Tenderness: There is no abdominal tenderness  There is no guarding  Musculoskeletal: Normal range of motion  Neurological:      General: No focal deficit present  Mental Status: He is alert and oriented to person, place, and time  Mental status is at baseline  Cranial Nerves: No cranial nerve deficit     Psychiatric:         Mood and Affect: Mood normal          Behavior: Behavior normal

## 2021-05-06 ENCOUNTER — TELEPHONE (OUTPATIENT)
Dept: INTERNAL MEDICINE CLINIC | Facility: CLINIC | Age: 81
End: 2021-05-06

## 2021-05-06 NOTE — TELEPHONE ENCOUNTER
LMOM to check on the status of Cologuard      Let pt know that he can bring it to the office of call [employer] for pick-up

## 2021-08-18 ENCOUNTER — OFFICE VISIT (OUTPATIENT)
Dept: INTERNAL MEDICINE CLINIC | Facility: CLINIC | Age: 81
End: 2021-08-18
Payer: MEDICARE

## 2021-08-18 VITALS
BODY MASS INDEX: 28.28 KG/M2 | SYSTOLIC BLOOD PRESSURE: 135 MMHG | DIASTOLIC BLOOD PRESSURE: 85 MMHG | HEIGHT: 66 IN | HEART RATE: 98 BPM | TEMPERATURE: 97.4 F | OXYGEN SATURATION: 97 % | WEIGHT: 176 LBS

## 2021-08-18 DIAGNOSIS — R73.01 IMPAIRED FASTING BLOOD SUGAR: ICD-10-CM

## 2021-08-18 DIAGNOSIS — Z00.00 MEDICARE ANNUAL WELLNESS VISIT, SUBSEQUENT: Primary | ICD-10-CM

## 2021-08-18 DIAGNOSIS — R00.0 TACHYCARDIA: ICD-10-CM

## 2021-08-18 DIAGNOSIS — I10 ESSENTIAL HYPERTENSION: ICD-10-CM

## 2021-08-18 DIAGNOSIS — E78.2 MIXED HYPERLIPIDEMIA: ICD-10-CM

## 2021-08-18 PROCEDURE — G0439 PPPS, SUBSEQ VISIT: HCPCS | Performed by: INTERNAL MEDICINE

## 2021-08-18 PROCEDURE — 1123F ACP DISCUSS/DSCN MKR DOCD: CPT | Performed by: INTERNAL MEDICINE

## 2021-08-18 PROCEDURE — 99214 OFFICE O/P EST MOD 30 MIN: CPT | Performed by: INTERNAL MEDICINE

## 2021-08-18 NOTE — PATIENT INSTRUCTIONS

## 2021-08-18 NOTE — PROGRESS NOTES
Assessment and Plan:     Problem List Items Addressed This Visit     None      Visit Diagnoses     Medicare annual wellness visit, subsequent    -  Primary    Essential hypertension               Preventive health issues were discussed with patient, and age appropriate screening tests were ordered as noted in patient's After Visit Summary  Personalized health advice and appropriate referrals for health education or preventive services given if needed, as noted in patient's After Visit Summary       History of Present Illness:     Patient presents for Medicare Annual Wellness visit    Patient Care Team:  Dennie Jabs, MD as PCP - General (Internal Medicine)  Tashia Liu MD     Problem List:     Patient Active Problem List   Diagnosis    BPH associated with nocturia    White coat syndrome with diagnosis of hypertension    Mixed hyperlipidemia    Lumbar stenosis with neurogenic claudication    PVD (peripheral vascular disease) ([facility] [address] )      Past Medical and Surgical History:     Past Medical History:   Diagnosis Date    Arthritis     Basal cell carcinoma     Hypertension     Kidney stone     Skin cancer     Squamous cell carcinoma of skin      Past Surgical History:   Procedure Laterality Date    KIDNEY STONE SURGERY  1982    MENISCECTOMY  2000    MD WRIST [place] LIG Right 1/19/2021    Procedure: RELEASE CARPAL TUNNEL ENDOSCOPIC;  Surgeon: Rod Cleary MD;  Location: [facility];  Service: Orthopedics    TONSILLECTOMY AND ADENOIDECTOMY  1940      Family History:     Family History   Problem Relation Age of Onset    Arthritis Family     Hypertension Family     Cancer Family     Breast cancer Mother     Heart disease Father       Social History:     Social History     Socioeconomic History    Marital status: /Civil Union     Spouse name: None    Number of children: 3    Years of education: None    Highest education level: None   Occupational History    Occupation: Retired   Tobacco Use    Smoking status: Never Smoker    Smokeless tobacco: Never Used   Substance and Sexual Activity    Alcohol use: Never    Drug use: Never    Sexual activity: None   Other Topics Concern    None   Social History Narrative    None     Social Determinants of Health     Financial Resource Strain:     Difficulty of Paying Living Expenses:    Food Insecurity:     Worried About Running Out of Food in the Last Year:     Ran Out of Food in the Last Year:    Transportation Needs:     Lack of Transportation (Medical):      Lack of Transportation (Non-Medical):    Physical Activity:     Days of Exercise per Week:     Minutes of Exercise per Session:    Stress:     Feeling of Stress :    Social Connections:     Frequency of Communication with Friends and Family:     Frequency of Social Gatherings with Friends and Family:     Attends Orthodoxy Services:     Active Member of Clubs or Organizations:     Attends Club or Organization Meetings:     Marital Status:    Intimate Partner Violence:     Fear of Current or Ex-Partner:     Emotionally Abused:     Physically Abused:     Sexually Abused:       Medications and Allergies:     Current Outpatient Medications   Medication Sig Dispense Refill    amLODIPine (NORVASC) 5 mg tablet Take 1 tablet (5 mg total) by mouth daily 90 tablet 3    Cholecalciferol (VITAMIN D3) 94202 units TABS Take 2,000 Units by mouth daily       Coenzyme Q10 (COQ10) 100 MG CAPS Take 200 mg by mouth daily      irbesartan (AVAPRO) 150 mg tablet Take 1 tablet (150 mg total) by mouth daily at bedtime 90 tablet 1    multivitamin (THERAGRAN) TABS Take 1 tablet by mouth daily      Omega-3 Fatty Acids (OMEGA-3 CF) 1000 MG CAPS Take 1 capsule by mouth daily      acetaminophen (TYLENOL) 650 mg CR tablet Take 1 tablet (650 mg total) by mouth every 8 (eight) hours as needed for mild pain (Patient not taking: Reported on 4/14/2021) 30 tablet 0    magnesium gluconate (MAGONATE) 500 mg tablet Take 1,000 mg by mouth daily (Patient not taking: Reported on 8/18/2021)      naproxen sodium (ALEVE) 220 MG tablet Take 1 tablet (220 mg total) by mouth 2 (two) times a day with meals (Patient not taking: Reported on 3/1/2021) 20 tablet 0     No current facility-administered medications for this visit  Allergies   Allergen Reactions    Sulfate       Immunizations:     Immunization History   Administered Date(s) Administered    INFLUENZA 10/30/2008    Influenza Injectable, MDCK, Preservative Free, Quadrivalent, 0 5 mL 09/16/2020    Influenza, seasonal, injectable 10/31/2011, 11/06/2013, 10/01/2015    Pneumococcal Conjugate 13-Valent 08/17/2015    Pneumococcal Polysaccharide PPV23 01/01/2008    SARS-CoV-2 / COVID-19 mRNA IM (Pfizer-BioNTech) 03/04/2021, 03/26/2021    Td (adult), adsorbed 03/01/1999    Tdap 08/16/2017, 11/05/2020    Zoster Vaccine Recombinant 02/23/2020, 06/22/2020      Health Maintenance:         Topic Date Due    Colorectal Cancer Screening  05/08/2020         Topic Date Due    Influenza Vaccine (1) 09/01/2021      Medicare Health Risk Assessment:     /85 (BP Location: Right arm, Patient Position: Sitting, Cuff Size: Standard)   Pulse 98   Temp (!) 97 4 °F (36 3 °C) (Temporal)   Ht 5' 6" (1 676 m)   Wt 79 8 kg (176 lb)   SpO2 97%   BMI 28 41 kg/m²      Sanju Clarke is here for his Subsequent Wellness visit  Health Risk Assessment:   Patient rates overall health as good  Patient feels that their physical health rating is same  Patient is very satisfied with their life  Eyesight was rated as same  Hearing was rated as same  Patient feels that their emotional and mental health rating is same  Patients states they are never, rarely angry  Patient states they are never, rarely unusually tired/fatigued  Pain experienced in the last 7 days has been none  Patient states that he has experienced no weight loss or gain in last 6 months       Fall Risk Screening: In the past year, patient has experienced: no history of falling in past year      Home Safety:  Patient does not have trouble with stairs inside or outside of their home  Patient has working smoke alarms and has working carbon monoxide detector  Home safety hazards include: none  Nutrition:   Current diet is Regular  Medications:   Patient is currently taking over-the-counter supplements  OTC medications include: see medication list  Patient is able to manage medications  Activities of Daily Living (ADLs)/Instrumental Activities of Daily Living (IADLs):   Walk and transfer into and out of bed and chair?: Yes  Dress and groom yourself?: Yes    Bathe or shower yourself?: Yes    Feed yourself? Yes  Do your laundry/housekeeping?: Yes  Manage your money, pay your bills and track your expenses?: Yes  Make your own meals?: Yes    Do your own shopping?: Yes    Previous Hospitalizations:   Any hospitalizations or ED visits within the last 12 months?: No      Advance Care Planning:   Living will: Yes    Durable POA for healthcare: Yes    Advanced directive: Yes      PREVENTIVE SCREENINGS      Cardiovascular Screening:    General: Screening Not Indicated and History Lipid Disorder      Diabetes Screening:     General: Screening Current      Prostate Cancer Screening:    General: Screening Not Indicated      Lung Cancer Screening:     General: Screening Not Indicated    Screening, Brief Intervention, and Referral to Treatment (SBIRT)    Screening  Typical number of drinks in a day: 0  Typical number of drinks in a week: 0  Interpretation: Low risk drinking behavior      Single Item Drug Screening:  How often have you used an illegal drug (including marijuana) or a prescription medication for non-medical reasons in the past year? never    Single Item Drug Screen Score: 0  Interpretation: Negative screen for possible drug use disorder    Other Counseling Topics:   Car/seat belt/driving safety, skin self-exam, sunscreen and regular weightbearing exercise and calcium and vitamin D intake         Tim Lew MD

## 2021-08-18 NOTE — PROGRESS NOTES
Assessment/Plan: This is 74-year-old gentleman with history of is hypertension hyperlipidemia BPH lumbar spondylosis and peripheral vascular disease  He denies any chest pain or shortness of breath  His back pain is under control at this time  He does have some palpitations and tachycardia his heart rate goes up  His father did have pacemaker at a later age  1  Medicare annual wellness visit, subsequent    2  Essential hypertension    3  Mixed hyperlipidemia    4  Impaired fasting blood sugar    5  Tachycardia  -     POCT ECG  -     Holter monitor - 48 hour; Future; Expected date: 08/18/2021           1  Medicare annual wellness visit, subsequent      2  Essential hypertension      3  Mixed hyperlipidemia      4  Impaired fasting blood sugar      5  Tachycardia    - POCT ECG           Subjective:      Patient ID: Nuvia Davis is a 79 y o  male  This is 74-year-old gentleman with history of is hypertension hyperlipidemia BPH lumbar spondylosis and peripheral vascular disease  He denies any chest pain or shortness of breath  His back pain is under control at this time  The following portions of the patient's history were reviewed and updated as appropriate: He  has a past medical history of Arthritis, Basal cell carcinoma, Hypertension, Kidney stone, Skin cancer, and Squamous cell carcinoma of skin  He   Patient Active Problem List    Diagnosis Date Noted    PVD (peripheral vascular disease) ([facility] [address] ) 04/14/2021    Lumbar stenosis with neurogenic claudication     BPH associated with nocturia 12/14/2015    White coat syndrome with diagnosis of hypertension 05/18/2012    Mixed hyperlipidemia 05/18/2012     He  has a past surgical history that includes Kidney stone surgery (1982); Meniscectomy (2000); Tonsillectomy and adenoidectomy (1940); and pr wrist arthroscop,release xvers lig (Right, 1/19/2021)    His family history includes Arthritis in his family; Breast cancer in his mother; Cancer in his family; Heart disease in his father; Hypertension in his family  He  reports that he has never smoked  He has never used smokeless tobacco  He reports that he does not drink alcohol and does not use drugs  Current Outpatient Medications   Medication Sig Dispense Refill    amLODIPine (NORVASC) 5 mg tablet Take 1 tablet (5 mg total) by mouth daily 90 tablet 3    Cholecalciferol (VITAMIN D3) 24326 units TABS Take 2,000 Units by mouth daily       Coenzyme Q10 (COQ10) 100 MG CAPS Take 200 mg by mouth daily      irbesartan (AVAPRO) 150 mg tablet Take 1 tablet (150 mg total) by mouth daily at bedtime 90 tablet 1    multivitamin (THERAGRAN) TABS Take 1 tablet by mouth daily      Omega-3 Fatty Acids (OMEGA-3 CF) 1000 MG CAPS Take 1 capsule by mouth daily      acetaminophen (TYLENOL) 650 mg CR tablet Take 1 tablet (650 mg total) by mouth every 8 (eight) hours as needed for mild pain (Patient not taking: Reported on 4/14/2021) 30 tablet 0    magnesium gluconate (MAGONATE) 500 mg tablet Take 1,000 mg by mouth daily (Patient not taking: Reported on 8/18/2021)      naproxen sodium (ALEVE) 220 MG tablet Take 1 tablet (220 mg total) by mouth 2 (two) times a day with meals (Patient not taking: Reported on 3/1/2021) 20 tablet 0     No current facility-administered medications for this visit       Current Outpatient Medications on File Prior to Visit   Medication Sig    amLODIPine (NORVASC) 5 mg tablet Take 1 tablet (5 mg total) by mouth daily    Cholecalciferol (VITAMIN D3) 57706 units TABS Take 2,000 Units by mouth daily     Coenzyme Q10 (COQ10) 100 MG CAPS Take 200 mg by mouth daily    irbesartan (AVAPRO) 150 mg tablet Take 1 tablet (150 mg total) by mouth daily at bedtime    multivitamin (THERAGRAN) TABS Take 1 tablet by mouth daily    Omega-3 Fatty Acids (OMEGA-3 CF) 1000 MG CAPS Take 1 capsule by mouth daily    acetaminophen (TYLENOL) 650 mg CR tablet Take 1 tablet (650 mg total) by mouth every 8 (eight) Normal pulses  Heart sounds: Normal heart sounds  No murmur heard  No friction rub  Pulmonary:      Effort: Pulmonary effort is normal  No respiratory distress  Breath sounds: Normal breath sounds  No wheezing  Abdominal:      General: Abdomen is flat  Bowel sounds are normal  There is no distension  Palpations: Abdomen is soft  There is no mass  Tenderness: There is no abdominal tenderness  There is no guarding  Musculoskeletal:         General: Normal range of motion  Cervical back: Normal range of motion  Skin:     General: Skin is warm  Neurological:      General: No focal deficit present  Mental Status: He is alert and oriented to person, place, and time  Mental status is at baseline  Cranial Nerves: No cranial nerve deficit  Psychiatric:         Mood and Affect: Mood normal          Behavior: Behavior normal        EKG: normal EKG, normal sinus rhythm  BMI Counseling: Body mass index is 28 41 kg/m²  The BMI is above normal  Nutrition recommendations include reducing portion sizes, decreasing overall calorie intake and 3-5 servings of fruits/vegetables daily

## 2021-08-20 PROCEDURE — 93000 ELECTROCARDIOGRAM COMPLETE: CPT | Performed by: INTERNAL MEDICINE

## 2021-08-27 ENCOUNTER — HOSPITAL ENCOUNTER (OUTPATIENT)
Dept: NON INVASIVE DIAGNOSTICS | Facility: CLINIC | Age: 81
Discharge: HOME | End: 2021-08-27
Payer: MEDICARE

## 2021-08-27 DIAGNOSIS — R00.0 TACHYCARDIA: ICD-10-CM

## 2021-08-27 PROCEDURE — 93225 XTRNL ECG REC<48 HRS REC: CPT

## 2021-08-27 PROCEDURE — 93226 XTRNL ECG REC<48 HR SCAN A/R: CPT

## 2021-09-03 PROCEDURE — 93227 XTRNL ECG REC<48 HR R&I: CPT | Performed by: INTERNAL MEDICINE

## 2021-09-28 DIAGNOSIS — I10 ESSENTIAL HYPERTENSION: ICD-10-CM

## 2021-09-28 RX ORDER — IRBESARTAN 150 MG/1
TABLET ORAL
Qty: 90 TABLET | Refills: 3 | Status: SHIPPED | OUTPATIENT
Start: 2021-09-28 | End: 2021-11-17 | Stop reason: SDUPTHER

## 2021-11-17 ENCOUNTER — OFFICE VISIT (OUTPATIENT)
Dept: INTERNAL MEDICINE CLINIC | Facility: CLINIC | Age: 81
End: 2021-11-17
Payer: MEDICARE

## 2021-11-17 VITALS
WEIGHT: 164.2 LBS | BODY MASS INDEX: 26.39 KG/M2 | DIASTOLIC BLOOD PRESSURE: 59 MMHG | HEART RATE: 79 BPM | HEIGHT: 66 IN | SYSTOLIC BLOOD PRESSURE: 120 MMHG | TEMPERATURE: 99.3 F | OXYGEN SATURATION: 100 %

## 2021-11-17 DIAGNOSIS — E78.2 MIXED HYPERLIPIDEMIA: ICD-10-CM

## 2021-11-17 DIAGNOSIS — R73.01 IMPAIRED FASTING BLOOD SUGAR: ICD-10-CM

## 2021-11-17 DIAGNOSIS — I10 ESSENTIAL HYPERTENSION: Primary | ICD-10-CM

## 2021-11-17 PROCEDURE — 99214 OFFICE O/P EST MOD 30 MIN: CPT | Performed by: INTERNAL MEDICINE

## 2021-11-17 RX ORDER — IRBESARTAN 150 MG/1
150 TABLET ORAL
Qty: 90 TABLET | Refills: 1 | Status: SHIPPED | OUTPATIENT
Start: 2021-11-17 | End: 2022-06-23 | Stop reason: SDUPTHER

## 2021-11-17 RX ORDER — IRBESARTAN 150 MG/1
150 TABLET ORAL
Qty: 90 TABLET | Refills: 1 | Status: SHIPPED | OUTPATIENT
Start: 2021-11-17 | End: 2021-11-17

## 2021-12-06 ENCOUNTER — ESTABLISHED COMPREHENSIVE EXAM (OUTPATIENT)
Dept: URBAN - METROPOLITAN AREA CLINIC 6 | Facility: CLINIC | Age: 81
End: 2021-12-06

## 2021-12-06 DIAGNOSIS — H35.3131: ICD-10-CM

## 2021-12-06 DIAGNOSIS — H25.13: ICD-10-CM

## 2021-12-06 PROCEDURE — 92134 CPTRZ OPH DX IMG PST SGM RTA: CPT

## 2021-12-06 PROCEDURE — 92015 DETERMINE REFRACTIVE STATE: CPT

## 2021-12-06 PROCEDURE — G8427 DOCREV CUR MEDS BY ELIG CLIN: HCPCS

## 2021-12-06 PROCEDURE — 92014 COMPRE OPH EXAM EST PT 1/>: CPT

## 2021-12-06 PROCEDURE — 1036F TOBACCO NON-USER: CPT

## 2021-12-06 PROCEDURE — 2019F DILATED MACUL EXAM DONE: CPT

## 2021-12-06 ASSESSMENT — VISUAL ACUITY
OS_GLARE: 20/60
OD_CC: 20/25
OD_GLARE: 20/60
OS_CC: 20/30

## 2021-12-06 ASSESSMENT — TONOMETRY
OS_IOP_MMHG: 10
OD_IOP_MMHG: 9

## 2021-12-26 DIAGNOSIS — I10 ESSENTIAL HYPERTENSION: ICD-10-CM

## 2021-12-27 RX ORDER — AMLODIPINE BESYLATE 5 MG/1
TABLET ORAL
Qty: 90 TABLET | Refills: 3 | Status: SHIPPED | OUTPATIENT
Start: 2021-12-27

## 2022-03-11 LAB
ALBUMIN SERPL-MCNC: 4.2 G/DL (ref 3.6–5.1)
ALBUMIN/GLOB SERPL: 1.5 (CALC) (ref 1–2.5)
ALP SERPL-CCNC: 66 U/L (ref 35–144)
ALT SERPL-CCNC: 15 U/L (ref 9–46)
APPEARANCE UR: CLEAR
AST SERPL-CCNC: 16 U/L (ref 10–35)
BACTERIA UR QL AUTO: NORMAL /HPF
BASOPHILS # BLD AUTO: 60 CELLS/UL (ref 0–200)
BASOPHILS NFR BLD AUTO: 0.9 %
BILIRUB SERPL-MCNC: 0.9 MG/DL (ref 0.2–1.2)
BILIRUB UR QL STRIP: NEGATIVE
BUN SERPL-MCNC: 21 MG/DL (ref 7–25)
BUN/CREAT SERPL: ABNORMAL (CALC) (ref 6–22)
CALCIUM SERPL-MCNC: 9.5 MG/DL (ref 8.6–10.3)
CHLORIDE SERPL-SCNC: 98 MMOL/L (ref 98–110)
CHOLEST SERPL-MCNC: 130 MG/DL
CHOLEST/HDLC SERPL: 2.3 (CALC)
CO2 SERPL-SCNC: 30 MMOL/L (ref 20–32)
COLOR UR: YELLOW
CREAT SERPL-MCNC: 1.02 MG/DL (ref 0.7–1.11)
EOSINOPHIL # BLD AUTO: 221 CELLS/UL (ref 15–500)
EOSINOPHIL NFR BLD AUTO: 3.3 %
ERYTHROCYTE [DISTWIDTH] IN BLOOD BY AUTOMATED COUNT: 12.3 % (ref 11–15)
GLOBULIN SER CALC-MCNC: 2.8 G/DL (CALC) (ref 1.9–3.7)
GLUCOSE SERPL-MCNC: 107 MG/DL (ref 65–99)
GLUCOSE UR QL STRIP: NEGATIVE
HBA1C MFR BLD: 5.7 % OF TOTAL HGB
HCT VFR BLD AUTO: 45.1 % (ref 38.5–50)
HDLC SERPL-MCNC: 57 MG/DL
HGB BLD-MCNC: 15.2 G/DL (ref 13.2–17.1)
HGB UR QL STRIP: NEGATIVE
HYALINE CASTS #/AREA URNS LPF: NORMAL /LPF
KETONES UR QL STRIP: NEGATIVE
LDLC SERPL CALC-MCNC: 56 MG/DL (CALC)
LEUKOCYTE ESTERASE UR QL STRIP: NEGATIVE
LYMPHOCYTES # BLD AUTO: 1930 CELLS/UL (ref 850–3900)
LYMPHOCYTES NFR BLD AUTO: 28.8 %
MCH RBC QN AUTO: 29.9 PG (ref 27–33)
MCHC RBC AUTO-ENTMCNC: 33.7 G/DL (ref 32–36)
MCV RBC AUTO: 88.8 FL (ref 80–100)
MONOCYTES # BLD AUTO: 777 CELLS/UL (ref 200–950)
MONOCYTES NFR BLD AUTO: 11.6 %
NEUTROPHILS # BLD AUTO: 3712 CELLS/UL (ref 1500–7800)
NEUTROPHILS NFR BLD AUTO: 55.4 %
NITRITE UR QL STRIP: NEGATIVE
NONHDLC SERPL-MCNC: 73 MG/DL (CALC)
PH UR STRIP: 6.5 [PH] (ref 5–8)
PLATELET # BLD AUTO: 254 THOUSAND/UL (ref 140–400)
PMV BLD REES-ECKER: 11.3 FL (ref 7.5–12.5)
POTASSIUM SERPL-SCNC: 4.2 MMOL/L (ref 3.5–5.3)
PROT SERPL-MCNC: 7 G/DL (ref 6.1–8.1)
PROT UR QL STRIP: NEGATIVE
RBC # BLD AUTO: 5.08 MILLION/UL (ref 4.2–5.8)
RBC #/AREA URNS HPF: NORMAL /HPF
SL AMB EGFR AFRICAN AMERICAN: 79 ML/MIN/1.73M2
SL AMB EGFR NON AFRICAN AMERICAN: 68 ML/MIN/1.73M2
SODIUM SERPL-SCNC: 133 MMOL/L (ref 135–146)
SP GR UR STRIP: 1.01 (ref 1–1.03)
SQUAMOUS #/AREA URNS HPF: NORMAL /HPF
TRIGL SERPL-MCNC: 87 MG/DL
WBC # BLD AUTO: 6.7 THOUSAND/UL (ref 3.8–10.8)
WBC #/AREA URNS HPF: NORMAL /HPF

## 2022-03-18 ENCOUNTER — TELEPHONE (OUTPATIENT)
Dept: ADMINISTRATIVE | Facility: OTHER | Age: 82
End: 2022-03-18

## 2022-03-18 ENCOUNTER — OFFICE VISIT (OUTPATIENT)
Dept: INTERNAL MEDICINE CLINIC | Facility: CLINIC | Age: 82
End: 2022-03-18
Payer: MEDICARE

## 2022-03-18 VITALS
HEIGHT: 66 IN | TEMPERATURE: 99.6 F | DIASTOLIC BLOOD PRESSURE: 74 MMHG | BODY MASS INDEX: 26.84 KG/M2 | SYSTOLIC BLOOD PRESSURE: 130 MMHG | OXYGEN SATURATION: 98 % | WEIGHT: 167 LBS | HEART RATE: 72 BPM

## 2022-03-18 DIAGNOSIS — R73.01 IMPAIRED FASTING BLOOD SUGAR: ICD-10-CM

## 2022-03-18 DIAGNOSIS — E78.2 MIXED HYPERLIPIDEMIA: ICD-10-CM

## 2022-03-18 DIAGNOSIS — I10 ESSENTIAL HYPERTENSION: Primary | ICD-10-CM

## 2022-03-18 DIAGNOSIS — E55.9 VITAMIN D DEFICIENCY, UNSPECIFIED: ICD-10-CM

## 2022-03-18 PROCEDURE — 99214 OFFICE O/P EST MOD 30 MIN: CPT | Performed by: INTERNAL MEDICINE

## 2022-03-18 NOTE — TELEPHONE ENCOUNTER
----- Message from [facility] sent at 3/18/2022 10:44 AM EDT -----  Regarding: varun  03/18/22 10:44 AM    Hello, our patient Karol Landaverde has had CRC: Varun completed/performed  Please assist in updating the patient chart by pulling the document from the Media Tab  The date of service is 11/23/21       Thank you,  Reyna Armstrong  [facility] INTERNAL MED

## 2022-03-18 NOTE — PROGRESS NOTES
Assessment/Plan:           1  Essential hypertension  Comments:  Controlled on amlodipine 5 mg and irbesartan 150 mg daily  Blood pressure at home is 131-428 systolic  2  Mixed hyperlipidemia  Comments:  Labs were reviewed  3  Impaired fasting blood sugar    4  Vitamin D deficiency, unspecified  Comments:  Continue supplementation  There are no diagnoses linked to this encounter  No problem-specific Assessment & Plan notes found for this encounter  Subjective:      Patient ID: Tony Lo is a 79 y o  male  This is 19-year-old gentleman with a history of hypertension hyperlipidemia BPH lumbar spondylosis      The following portions of the patient's history were reviewed and updated as appropriate: He  has a past medical history of Arthritis, Basal cell carcinoma, Hypertension, Kidney stone, Skin cancer, and Squamous cell carcinoma of skin  He   Patient Active Problem List    Diagnosis Date Noted    [facility]    Lumbar stenosis with neurogenic claudication     BPH associated with nocturia 12/14/2015    White coat syndrome with diagnosis of hypertension 05/18/2012    Mixed hyperlipidemia 05/18/2012     He  has a past surgical history that includes Kidney stone surgery (1982); Meniscectomy (2000); Tonsillectomy and adenoidectomy (1940); and pr wrist arthroscop,release xvers lig (Right, 1/19/2021)  His family history includes Arthritis in his family; Breast cancer in his mother; Cancer in his family; Heart disease in his father; Hypertension in his family  He  reports that he has never smoked  He has never used smokeless tobacco  He reports that he does not drink alcohol and does not use drugs    Current Outpatient Medications   Medication Sig Dispense Refill    amLODIPine (NORVASC) 5 mg tablet TAKE 1 TABLET BY MOUTH  DAILY 90 tablet 3    Cholecalciferol (VITAMIN D3) 53472 units TABS Take 2,000 Units by mouth daily       Coenzyme Q10 (COQ10) 100 MG CAPS Take 200 mg by mouth daily      irbesartan (AVAPRO) 150 mg tablet Take 1 tablet (150 mg total) by mouth daily at bedtime 90 tablet 1    multivitamin (THERAGRAN) TABS Take 1 tablet by mouth daily      Omega-3 Fatty Acids (OMEGA-3 CF) 1000 MG CAPS Take 1 capsule by mouth daily      acetaminophen (TYLENOL) 650 mg CR tablet Take 1 tablet (650 mg total) by mouth every 8 (eight) hours as needed for mild pain (Patient not taking: Reported on 3/18/2022 ) 30 tablet 0    magnesium gluconate (MAGONATE) 500 mg tablet Take 1,000 mg by mouth daily (Patient not taking: Reported on 8/18/2021)      naproxen sodium (ALEVE) 220 MG tablet Take 1 tablet (220 mg total) by mouth 2 (two) times a day with meals (Patient not taking: Reported on 3/1/2021) 20 tablet 0     No current facility-administered medications for this visit  Current Outpatient Medications on File Prior to Visit   Medication Sig    amLODIPine (NORVASC) 5 mg tablet TAKE 1 TABLET BY MOUTH  DAILY    Cholecalciferol (VITAMIN D3) 18333 units TABS Take 2,000 Units by mouth daily     Coenzyme Q10 (COQ10) 100 MG CAPS Take 200 mg by mouth daily    irbesartan (AVAPRO) 150 mg tablet Take 1 tablet (150 mg total) by mouth daily at bedtime    multivitamin (THERAGRAN) TABS Take 1 tablet by mouth daily    Omega-3 Fatty Acids (OMEGA-3 CF) 1000 MG CAPS Take 1 capsule by mouth daily    acetaminophen (TYLENOL) 650 mg CR tablet Take 1 tablet (650 mg total) by mouth every 8 (eight) hours as needed for mild pain (Patient not taking: Reported on 3/18/2022 )    magnesium gluconate (MAGONATE) 500 mg tablet Take 1,000 mg by mouth daily (Patient not taking: Reported on 8/18/2021)    naproxen sodium (ALEVE) 220 MG tablet Take 1 tablet (220 mg total) by mouth 2 (two) times a day with meals (Patient not taking: Reported on 3/1/2021)     No current facility-administered medications on file prior to visit  He is allergic to sulfate       Review of Systems Constitutional: Negative for appetite change, chills, fatigue and fever  HENT: Negative for sore throat and trouble swallowing  Eyes: Negative for redness  Respiratory: Negative for shortness of breath  Cardiovascular: Negative for chest pain and palpitations  Gastrointestinal: Negative for abdominal pain, constipation and diarrhea  Genitourinary: Negative for dysuria and hematuria  Musculoskeletal: Negative for back pain and neck pain  Skin: Negative for rash  Neurological: Negative for seizures, weakness and headaches  Hematological: Negative for adenopathy  Psychiatric/Behavioral: Negative for confusion  The patient is not nervous/anxious            Objective:      /74   Pulse 72   Temp 99 6 °F (37 6 °C) (Temporal)   Ht 5' 6" (1 676 m)   Wt 75 8 kg (167 lb)   SpO2 98%   BMI 26 95 kg/m²     Results Reviewed     None          Recent Results (from the past 1344 hour(s))   Lipid panel    Collection Time: 03/11/22  6:58 AM   Result Value Ref Range    Total Cholesterol 130 <200 mg/dL    HDL 57 > OR = 40 mg/dL    Triglycerides 87 <150 mg/dL    LDL Calculated 56 mg/dL (calc)    Chol HDLC Ratio 2 3 <5 0 (calc)    Non-HDL Cholesterol 73 <130 mg/dL (calc)   Comprehensive metabolic panel    Collection Time: 03/11/22  6:58 AM   Result Value Ref Range    Glucose, Random 107 (H) 65 - 99 mg/dL    BUN 21 7 - 25 mg/dL    Creatinine 1 02 0 70 - 1 11 mg/dL    eGFR Non  68 > OR = 60 mL/min/1 73m2    eGFR  79 > OR = 60 mL/min/1 73m2    SL AMB BUN/CREATININE RATIO NOT APPLICABLE 6 - 22 (calc)    Sodium 133 (L) 135 - 146 mmol/L    Potassium 4 2 3 5 - 5 3 mmol/L    Chloride 98 98 - 110 mmol/L    CO2 30 20 - 32 mmol/L    Calcium 9 5 8 6 - 10 3 mg/dL    Protein, Total 7 0 6 1 - 8 1 g/dL    Albumin 4 2 3 6 - 5 1 g/dL    Globulin 2 8 1 9 - 3 7 g/dL (calc)    Albumin/Globulin Ratio 1 5 1 0 - 2 5 (calc)    TOTAL BILIRUBIN 0 9 0 2 - 1 2 mg/dL    Alkaline Phosphatase 66 35 - 144 U/L    AST 16 10 - 35 U/L    ALT 15 9 - 46 U/L   Urinalysis with microscopic    Collection Time: 03/11/22  6:58 AM   Result Value Ref Range    Color UA YELLOW YELLOW    Urine Appearance CLEAR CLEAR    Specific Gravity 1 008 1 001 - 1 035    Ph 6 5 5 0 - 8 0    Glucose, Urine NEGATIVE NEGATIVE    Bilirubin, Urine NEGATIVE NEGATIVE    Ketone, Urine NEGATIVE NEGATIVE    Blood, Urine NEGATIVE NEGATIVE    Protein, Urine NEGATIVE NEGATIVE    SL AMB NITRITES URINE, QUAL  NEGATIVE NEGATIVE    Leukocyte Esterase NEGATIVE NEGATIVE    SL AMB WBC, URINE NONE SEEN < OR = 5 /HPF    RBC, Urine NONE SEEN < OR = 2 /HPF    Squamous Epithelial Cells NONE SEEN < OR = 5 /HPF    Bacteria, UA NONE SEEN NONE SEEN /HPF    Hyaline Casts NONE SEEN NONE SEEN /LPF   CBC and differential    Collection Time: 03/11/22  6:58 AM   Result Value Ref Range    White Blood Cell Count 6 7 3 8 - 10 8 Thousand/uL    Red Blood Cell Count 5 08 4 20 - 5 80 Million/uL    Hemoglobin 15 2 13 2 - 17 1 g/dL    HCT 45 1 38 5 - 50 0 %    MCV 88 8 80 0 - 100 0 fL    MCH 29 9 27 0 - 33 0 pg    MCHC 33 7 32 0 - 36 0 g/dL    RDW 12 3 11 0 - 15 0 %    Platelet Count 286 907 - 400 Thousand/uL    SL AMB MPV 11 3 7 5 - 12 5 fL    Neutrophils (Absolute) 3,712 1,500 - 7,800 cells/uL    Lymphocytes (Absolute) 1,930 850 - 3,900 cells/uL    Monocytes (Absolute) 777 200 - 950 cells/uL    Eosinophils (Absolute) 221 15 - 500 cells/uL    Basophils ABS 60 0 - 200 cells/uL    Neutrophils 55 4 %    Lymphocytes 28 8 %    Monocytes 11 6 %    Eosinophils 3 3 %    Basophils PCT 0 9 %   Hemoglobin A1c (w/out EAG)    Collection Time: 03/11/22  6:58 AM   Result Value Ref Range    Hemoglobin A1C 5 7 (H) <5 7 % of total Hgb        Physical Exam  Constitutional:       General: He is not in acute distress  Appearance: Normal appearance  HENT:      Head: Normocephalic and atraumatic        Right Ear: Tympanic membrane normal       Left Ear: Tympanic membrane normal       Nose: Nose normal  Mouth/Throat:      Mouth: Mucous membranes are moist    Eyes:      Extraocular Movements: Extraocular movements intact  Pupils: Pupils are equal, round, and reactive to light  Cardiovascular:      Rate and Rhythm: Normal rate and regular rhythm  Pulses: Normal pulses  Heart sounds: Normal heart sounds  No murmur heard  No friction rub  Pulmonary:      Effort: Pulmonary effort is normal  No respiratory distress  Breath sounds: Normal breath sounds  No wheezing  Abdominal:      General: Abdomen is flat  Bowel sounds are normal  There is no distension  Palpations: Abdomen is soft  There is no mass  Tenderness: There is no abdominal tenderness  There is no guarding  Musculoskeletal:         General: Normal range of motion  Cervical back: Normal range of motion  Skin:     General: Skin is warm  Neurological:      General: No focal deficit present  Mental Status: He is alert and oriented to person, place, and time  Mental status is at baseline  Cranial Nerves: No cranial nerve deficit  Psychiatric:         Mood and Affect: Mood normal          Behavior: Behavior normal        BMI Counseling: Body mass index is 26 95 kg/m²  The BMI is above normal  Nutrition recommendations include reducing portion sizes

## 2022-03-18 NOTE — TELEPHONE ENCOUNTER
Upon review of the In Basket request we have found this is a duplicate request and no further action is needed  This request was completed upon initial request, the patient chart is up to date, and this message will now be closed  HM is up to date    Any additional questions or concerns should be emailed to the Practice Liaisons via [email or web address] email, please do not reply via In Basket      Thank you  Autumn Oleary

## 2022-04-06 ENCOUNTER — TELEPHONE (OUTPATIENT)
Dept: INTERNAL MEDICINE CLINIC | Facility: CLINIC | Age: 82
End: 2022-04-06

## 2022-04-06 NOTE — TELEPHONE ENCOUNTER
Patient called and wanted to know from DR Estefania Whyte what orthopedic DR he suggests he has pain in right arm and shoulder

## 2022-04-25 ENCOUNTER — APPOINTMENT (OUTPATIENT)
Dept: RADIOLOGY | Facility: OTHER | Age: 82
End: 2022-04-25
Payer: MEDICARE

## 2022-04-25 ENCOUNTER — OFFICE VISIT (OUTPATIENT)
Dept: OBGYN CLINIC | Facility: OTHER | Age: 82
End: 2022-04-25
Payer: MEDICARE

## 2022-04-25 VITALS
SYSTOLIC BLOOD PRESSURE: 148 MMHG | HEART RATE: 90 BPM | WEIGHT: 168 LBS | BODY MASS INDEX: 27 KG/M2 | DIASTOLIC BLOOD PRESSURE: 73 MMHG | HEIGHT: 66 IN

## 2022-04-25 DIAGNOSIS — M19.011 PRIMARY OSTEOARTHRITIS OF RIGHT SHOULDER: ICD-10-CM

## 2022-04-25 DIAGNOSIS — M75.41 SUBACROMIAL IMPINGEMENT OF RIGHT SHOULDER: Primary | ICD-10-CM

## 2022-04-25 DIAGNOSIS — M25.511 RIGHT SHOULDER PAIN, UNSPECIFIED CHRONICITY: ICD-10-CM

## 2022-04-25 PROCEDURE — 20610 DRAIN/INJ JOINT/BURSA W/O US: CPT | Performed by: ORTHOPAEDIC SURGERY

## 2022-04-25 PROCEDURE — 99214 OFFICE O/P EST MOD 30 MIN: CPT | Performed by: ORTHOPAEDIC SURGERY

## 2022-04-25 PROCEDURE — 73030 X-RAY EXAM OF SHOULDER: CPT

## 2022-04-25 RX ORDER — BETAMETHASONE SODIUM PHOSPHATE AND BETAMETHASONE ACETATE 3; 3 MG/ML; MG/ML
6 INJECTION, SUSPENSION INTRA-ARTICULAR; INTRALESIONAL; INTRAMUSCULAR; SOFT TISSUE
Status: COMPLETED | OUTPATIENT
Start: 2022-04-25 | End: 2022-04-25

## 2022-04-25 RX ORDER — BUPIVACAINE HYDROCHLORIDE 2.5 MG/ML
2 INJECTION, SOLUTION INFILTRATION; PERINEURAL
Status: COMPLETED | OUTPATIENT
Start: 2022-04-25 | End: 2022-04-25

## 2022-04-25 RX ADMIN — BUPIVACAINE HYDROCHLORIDE 2 ML: 2.5 INJECTION, SOLUTION INFILTRATION; PERINEURAL at 10:15

## 2022-04-25 RX ADMIN — BETAMETHASONE SODIUM PHOSPHATE AND BETAMETHASONE ACETATE 6 MG: 3; 3 INJECTION, SUSPENSION INTRA-ARTICULAR; INTRALESIONAL; INTRAMUSCULAR; SOFT TISSUE at 10:15

## 2022-04-25 NOTE — PROGRESS NOTES
Assessment  Diagnoses and all orders for this visit:    Subacromial impingement of right shoulder    Primary osteoarthritis of right shoulder    Discussion and Plan:    · The patient has an examination consistent with subacromial impingement syndrome of the right shoulder  He also has mild glenohumeral joint osteoarthritis  I have discussed with the patient the pathophysiology of this diagnosis and reviewed how the examination correlates with this diagnosis  Treatment options were discussed at length and after discussing these treatment options, the patient elected for and received a subacromial injection of corticosteroid (as described in the procedure note) with a prescription for referral to physical therapy  · We will reevaluate the patient in 6-8 weeks  If the symptoms fail to improve with this treatment the patient would be indicated for further imaging in the form of an MRI scan of the shoulder  · There is some mild degenerative change of the glenohumeral joint on x-ray but that does not appear to be his main symptom generator at this time    Subjective:   Patient ID: Kiera Vogel is a 79 y o  male    The patient presents with a chief complaint of right shoulder pain  The pain began 2 month(s) ago and is not associated with an acute injury  The patient describes the pain as aching and dull in intensity,  intermittent, awakening patient from sleep in timing, and localizes the pain to the  right subacromial joint, deltoid  The pain is worse with overhead work, overuse and raising arm over head and relieved by rest, ice, avoiding the painful activities  The pain is not associated with numbness and tingling  The pain is not associated with constitutional symptoms  The patient is awoken at night by the pain  The patient has had no prior treatment       The following portions of the patient's history were reviewed and updated as appropriate: allergies, current medications, past family history, past medical history, past social history, past surgical history and problem list     Objective:  /73   Pulse 90   Ht 5' 6" (1 676 m)   Wt 76 2 kg (168 lb)   BMI 27 12 kg/m²       Right Shoulder Exam     Tenderness   The patient is experiencing no tenderness  Range of Motion   The patient has normal right shoulder ROM  Muscle Strength   Abduction: 4/5     Tests   Covarrubias test: positive  Impingement: positive    Other   Erythema: absent  Sensation: normal  Pulse: present              Physical Exam  Constitutional:       General: He is not in acute distress  Appearance: He is well-developed  Eyes:      Conjunctiva/sclera: Conjunctivae normal       Pupils: Pupils are equal, round, and reactive to light  Cardiovascular:      Rate and Rhythm: Normal rate and regular rhythm  Pulmonary:      Effort: Pulmonary effort is normal       Breath sounds: Normal breath sounds  Abdominal:      General: Bowel sounds are normal       Palpations: Abdomen is soft  Musculoskeletal:      Cervical back: Normal range of motion and neck supple  Skin:     General: Skin is warm and dry  Findings: No erythema or rash  Neurological:      Mental Status: He is alert and oriented to person, place, and time  Deep Tendon Reflexes: Reflexes are normal and symmetric  Psychiatric:         Behavior: Behavior normal        Large joint arthrocentesis: R subacromial bursa  Universal Protocol:  Consent: Verbal consent obtained  Risks and benefits: risks, benefits and alternatives were discussed  Consent given by: patient  Time out: Immediately prior to procedure a "time out" was called to verify the correct patient, procedure, equipment, support staff and site/side marked as required    Site marked: the operative site was marked  Supporting Documentation  Indications: pain and diagnostic evaluation   Procedure Details  Location: shoulder - R subacromial bursa  Preparation: Patient was prepped and draped in the usual sterile fashion  Needle size: 22 G  Ultrasound guidance: no  Approach: lateral  Medications administered: 2 mL bupivacaine 0 25 %; 6 mg betamethasone acetate-betamethasone sodium phosphate 6 (3-3) mg/mL    Patient tolerance: patient tolerated the procedure well with no immediate complications  Dressing:  Sterile dressing applied        I have personally reviewed pertinent films in PACS and my interpretation is as follows  X Ray Right Shoulder 4/25/2022: Mild glenohumeral joint osteoarthritis   No other acute osseous abnormalities    Scribe Attestation    I,:  Inez Varela am acting as a scribe while in the presence of the attending physician :       I,:  Arleta Ormond, MD personally performed the services described in this documentation    as scribed in my presence :

## 2022-04-25 NOTE — PATIENT INSTRUCTIONS

## 2022-05-02 ENCOUNTER — OFFICE VISIT (OUTPATIENT)
Dept: INTERNAL MEDICINE CLINIC | Facility: CLINIC | Age: 82
End: 2022-05-02
Payer: MEDICARE

## 2022-05-02 VITALS
SYSTOLIC BLOOD PRESSURE: 134 MMHG | BODY MASS INDEX: 27.32 KG/M2 | TEMPERATURE: 98.2 F | OXYGEN SATURATION: 98 % | HEART RATE: 100 BPM | DIASTOLIC BLOOD PRESSURE: 80 MMHG | WEIGHT: 170 LBS | HEIGHT: 66 IN

## 2022-05-02 DIAGNOSIS — I10 ESSENTIAL HYPERTENSION: ICD-10-CM

## 2022-05-02 DIAGNOSIS — N41.0 ACUTE PROSTATITIS: Primary | ICD-10-CM

## 2022-05-02 DIAGNOSIS — E55.9 VITAMIN D DEFICIENCY, UNSPECIFIED: ICD-10-CM

## 2022-05-02 DIAGNOSIS — R73.01 IMPAIRED FASTING BLOOD SUGAR: ICD-10-CM

## 2022-05-02 LAB
SL AMB  POCT GLUCOSE, UA: NORMAL
SL AMB LEUKOCYTE ESTERASE,UA: NORMAL
SL AMB POCT BILIRUBIN,UA: NORMAL
SL AMB POCT BLOOD,UA: NORMAL
SL AMB POCT CLARITY,UA: CLEAR
SL AMB POCT COLOR,UA: YELLOW
SL AMB POCT KETONES,UA: NORMAL
SL AMB POCT NITRITE,UA: NORMAL
SL AMB POCT PH,UA: 6
SL AMB POCT SPECIFIC GRAVITY,UA: 1.01
SL AMB POCT URINE PROTEIN: NORMAL
SL AMB POCT UROBILINOGEN: 0.2

## 2022-05-02 PROCEDURE — 99214 OFFICE O/P EST MOD 30 MIN: CPT | Performed by: INTERNAL MEDICINE

## 2022-05-02 PROCEDURE — 87086 URINE CULTURE/COLONY COUNT: CPT | Performed by: INTERNAL MEDICINE

## 2022-05-02 PROCEDURE — 81003 URINALYSIS AUTO W/O SCOPE: CPT | Performed by: INTERNAL MEDICINE

## 2022-05-02 RX ORDER — SULFAMETHOXAZOLE AND TRIMETHOPRIM 800; 160 MG/1; MG/1
1 TABLET ORAL EVERY 12 HOURS SCHEDULED
Qty: 42 TABLET | Refills: 0 | Status: SHIPPED | OUTPATIENT
Start: 2022-05-02 | End: 2022-05-03

## 2022-05-02 NOTE — PROGRESS NOTES
Assessment/Plan:           1  Acute prostatitis  Comments:  Urine dip was negative  Will send it for culture  Limited Ibuprofen or Tylenol for pain  Orders:  -     sulfamethoxazole-trimethoprim (BACTRIM DS) 800-160 mg per tablet; Take 1 tablet by mouth every 12 (twelve) hours for 21 days  -     POCT urine dip auto non-scope  -     Urine culture    2  Impaired fasting blood sugar  Comments:  Continue diabetic diet  3  Vitamin D deficiency, unspecified    4  Essential hypertension  Comments:  Continue amlodipine and irbesartan  1  Acute prostatitis    - sulfamethoxazole-trimethoprim (BACTRIM DS) 800-160 mg per tablet; Take 1 tablet by mouth every 12 (twelve) hours for 21 days  Dispense: 42 tablet; Refill: 0       No problem-specific Assessment & Plan notes found for this encounter  Subjective:      Patient ID: Tonny Gutiérrez is a 79 y o  male  HPI    The following portions of the patient's history were reviewed and updated as appropriate: He  has a past medical history of Arthritis, Basal cell carcinoma, Hypertension, Kidney stone, Skin cancer, and Squamous cell carcinoma of skin  He   Patient Active Problem List    Diagnosis Date Noted    Subacromial impingement of right shoulder 04/25/2022    Primary osteoarthritis of right shoulder 04/25/2022    PVD (peripheral vascular disease) ([facility] [address] ) 04/14/2021    Lumbar stenosis with neurogenic claudication     BPH associated with nocturia 12/14/2015    White coat syndrome with diagnosis of hypertension 05/18/2012    Mixed hyperlipidemia 05/18/2012     He  has a past surgical history that includes Kidney stone surgery (1982); Meniscectomy (2000); Tonsillectomy and adenoidectomy (1940); and pr wrist arthroscop,release xvers lig (Right, 1/19/2021)  His family history includes Arthritis in his family; Breast cancer in his mother; Cancer in his family; Heart disease in his father; Hypertension in his family  He  reports that he has never smoked  He has never used smokeless tobacco  He reports that he does not drink alcohol and does not use drugs  Current Outpatient Medications   Medication Sig Dispense Refill    acetaminophen (TYLENOL) 650 mg CR tablet Take 1 tablet (650 mg total) by mouth every 8 (eight) hours as needed for mild pain 30 tablet 0    amLODIPine (NORVASC) 5 mg tablet TAKE 1 TABLET BY MOUTH  DAILY 90 tablet 3    Cholecalciferol (VITAMIN D3) 97105 units TABS Take 2,000 Units by mouth daily       Coenzyme Q10 (COQ10) 100 MG CAPS Take 200 mg by mouth daily      irbesartan (AVAPRO) 150 mg tablet Take 1 tablet (150 mg total) by mouth daily at bedtime 90 tablet 1    magnesium gluconate (MAGONATE) 500 mg tablet Take 1,000 mg by mouth daily        multivitamin (THERAGRAN) TABS Take 1 tablet by mouth daily      naproxen sodium (ALEVE) 220 MG tablet Take 1 tablet (220 mg total) by mouth 2 (two) times a day with meals 20 tablet 0    Omega-3 Fatty Acids (OMEGA-3 CF) 1000 MG CAPS Take 1 capsule by mouth daily      sulfamethoxazole-trimethoprim (BACTRIM DS) 800-160 mg per tablet Take 1 tablet by mouth every 12 (twelve) hours for 21 days 42 tablet 0     No current facility-administered medications for this visit       Current Outpatient Medications on File Prior to Visit   Medication Sig    acetaminophen (TYLENOL) 650 mg CR tablet Take 1 tablet (650 mg total) by mouth every 8 (eight) hours as needed for mild pain    amLODIPine (NORVASC) 5 mg tablet TAKE 1 TABLET BY MOUTH  DAILY    Cholecalciferol (VITAMIN D3) 83789 units TABS Take 2,000 Units by mouth daily     Coenzyme Q10 (COQ10) 100 MG CAPS Take 200 mg by mouth daily    irbesartan (AVAPRO) 150 mg tablet Take 1 tablet (150 mg total) by mouth daily at bedtime    magnesium gluconate (MAGONATE) 500 mg tablet Take 1,000 mg by mouth daily      multivitamin (THERAGRAN) TABS Take 1 tablet by mouth daily    naproxen sodium (ALEVE) 220 MG tablet Take 1 tablet (220 mg total) by mouth 2 (two) times a day with meals    Omega-3 Fatty Acids (OMEGA-3 CF) 1000 MG CAPS Take 1 capsule by mouth daily     No current facility-administered medications on file prior to visit  He is allergic to sulfate       Review of Systems   Constitutional: Negative for appetite change, chills, fatigue and fever  HENT: Negative for sore throat and trouble swallowing  Eyes: Negative for redness  Respiratory: Negative for shortness of breath  Cardiovascular: Negative for chest pain and palpitations  Gastrointestinal: Negative for abdominal pain, constipation and diarrhea  Genitourinary: Negative for dysuria and hematuria  Rectal pain   Musculoskeletal: Negative for back pain and neck pain  Skin: Negative for rash  Neurological: Negative for seizures, weakness and headaches  Hematological: Negative for adenopathy  Psychiatric/Behavioral: Negative for confusion  The patient is not nervous/anxious            Objective:      /80 (BP Location: Left arm, Patient Position: Sitting, Cuff Size: Adult)   Pulse 100   Temp 98 2 °F (36 8 °C) (Temporal)   Ht 5' 6" (1 676 m)   Wt 77 1 kg (170 lb)   SpO2 98%   BMI 27 44 kg/m²     Results Reviewed     None          Recent Results (from the past 1344 hour(s))   Lipid panel    Collection Time: 03/11/22  6:58 AM   Result Value Ref Range    Total Cholesterol 130 <200 mg/dL    HDL 57 > OR = 40 mg/dL    Triglycerides 87 <150 mg/dL    LDL Calculated 56 mg/dL (calc)    Chol HDLC Ratio 2 3 <5 0 (calc)    Non-HDL Cholesterol 73 <130 mg/dL (calc)   Comprehensive metabolic panel    Collection Time: 03/11/22  6:58 AM   Result Value Ref Range    Glucose, Random 107 (H) 65 - 99 mg/dL    BUN 21 7 - 25 mg/dL    Creatinine 1 02 0 70 - 1 11 mg/dL    eGFR Non  68 > OR = 60 mL/min/1 73m2    eGFR  79 > OR = 60 mL/min/1 73m2    SL AMB BUN/CREATININE RATIO NOT APPLICABLE 6 - 22 (calc)    Sodium 133 (L) 135 - 146 mmol/L    Potassium 4 2 3 5 - 5 3 mmol/L Chloride 98 98 - 110 mmol/L    CO2 30 20 - 32 mmol/L    Calcium 9 5 8 6 - 10 3 mg/dL    Protein, Total 7 0 6 1 - 8 1 g/dL    Albumin 4 2 3 6 - 5 1 g/dL    Globulin 2 8 1 9 - 3 7 g/dL (calc)    Albumin/Globulin Ratio 1 5 1 0 - 2 5 (calc)    TOTAL BILIRUBIN 0 9 0 2 - 1 2 mg/dL    Alkaline Phosphatase 66 35 - 144 U/L    AST 16 10 - 35 U/L    ALT 15 9 - 46 U/L   Urinalysis with microscopic    Collection Time: 03/11/22  6:58 AM   Result Value Ref Range    Color UA YELLOW YELLOW    Urine Appearance CLEAR CLEAR    Specific Gravity 1 008 1 001 - 1 035    Ph 6 5 5 0 - 8 0    Glucose, Urine NEGATIVE NEGATIVE    Bilirubin, Urine NEGATIVE NEGATIVE    Ketone, Urine NEGATIVE NEGATIVE    Blood, Urine NEGATIVE NEGATIVE    Protein, Urine NEGATIVE NEGATIVE    SL AMB NITRITES URINE, QUAL   NEGATIVE NEGATIVE    Leukocyte Esterase NEGATIVE NEGATIVE    SL AMB WBC, URINE NONE SEEN < OR = 5 /HPF    RBC, Urine NONE SEEN < OR = 2 /HPF    Squamous Epithelial Cells NONE SEEN < OR = 5 /HPF    Bacteria, UA NONE SEEN NONE SEEN /HPF    Hyaline Casts NONE SEEN NONE SEEN /LPF   CBC and differential    Collection Time: 03/11/22  6:58 AM   Result Value Ref Range    White Blood Cell Count 6 7 3 8 - 10 8 Thousand/uL    Red Blood Cell Count 5 08 4 20 - 5 80 Million/uL    Hemoglobin 15 2 13 2 - 17 1 g/dL    HCT 45 1 38 5 - 50 0 %    MCV 88 8 80 0 - 100 0 fL    MCH 29 9 27 0 - 33 0 pg    MCHC 33 7 32 0 - 36 0 g/dL    RDW 12 3 11 0 - 15 0 %    Platelet Count 415 307 - 400 Thousand/uL    SL AMB MPV 11 3 7 5 - 12 5 fL    Neutrophils (Absolute) 3,712 1,500 - 7,800 cells/uL    Lymphocytes (Absolute) 1,930 850 - 3,900 cells/uL    Monocytes (Absolute) 777 200 - 950 cells/uL    Eosinophils (Absolute) 221 15 - 500 cells/uL    Basophils ABS 60 0 - 200 cells/uL    Neutrophils 55 4 %    Lymphocytes 28 8 %    Monocytes 11 6 %    Eosinophils 3 3 %    Basophils PCT 0 9 %   Hemoglobin A1c (w/out EAG)    Collection Time: 03/11/22  6:58 AM   Result Value Ref Range Hemoglobin A1C 5 7 (H) <5 7 % of total Hgb   POCT urine dip auto non-scope    Collection Time: 05/02/22  1:01 PM   Result Value Ref Range     COLOR,UA yellow     CLARITY,UA clear     SPECIFIC GRAVITY,UA 1 015      PH,UA 6 0     LEUKOCYTE ESTERASE,UA Neg     NITRITE,UA Neg     GLUCOSE, UA Neg     KETONES,UA Neg     BILIRUBIN,UA Neg     BLOOD,UA Neg     POCT URINE PROTEIN Neg     SL AMB POCT UROBILINOGEN 0 2         Physical Exam  Constitutional:       General: He is not in acute distress  Appearance: Normal appearance  HENT:      Head: Normocephalic and atraumatic  Nose: Nose normal       Mouth/Throat:      Mouth: Mucous membranes are moist    Eyes:      Extraocular Movements: Extraocular movements intact  Pupils: Pupils are equal, round, and reactive to light  Cardiovascular:      Rate and Rhythm: Normal rate and regular rhythm  Pulses: Normal pulses  Heart sounds: Normal heart sounds  No murmur heard  No friction rub  Pulmonary:      Effort: Pulmonary effort is normal  No respiratory distress  Breath sounds: Normal breath sounds  No wheezing  Abdominal:      General: Abdomen is flat  Bowel sounds are normal  There is no distension  Palpations: Abdomen is soft  There is no mass  Tenderness: There is no abdominal tenderness  There is no guarding  Genitourinary:     Comments: Prostate tender  Musculoskeletal:         General: Normal range of motion  Neurological:      General: No focal deficit present  Mental Status: He is alert and oriented to person, place, and time  Mental status is at baseline  Cranial Nerves: No cranial nerve deficit     Psychiatric:         Mood and Affect: Mood normal          Behavior: Behavior normal

## 2022-05-03 ENCOUNTER — TELEPHONE (OUTPATIENT)
Dept: INTERNAL MEDICINE CLINIC | Facility: CLINIC | Age: 82
End: 2022-05-03

## 2022-05-03 DIAGNOSIS — N41.0 ACUTE PROSTATITIS: Primary | ICD-10-CM

## 2022-05-03 RX ORDER — CIPROFLOXACIN 500 MG/1
500 TABLET, FILM COATED ORAL EVERY 12 HOURS SCHEDULED
Qty: 28 TABLET | Refills: 0 | Status: SHIPPED | OUTPATIENT
Start: 2022-05-03 | End: 2022-05-17

## 2022-05-03 NOTE — TELEPHONE ENCOUNTER
Patient called he stated the prescription was given to him for bactrim (sulfamethoxazole ) the pharmacy flagged it for him being allergic to sulfur  ,he is not sure because he has not had medication with sulfur in it , not sure if he should take it or not

## 2022-05-04 ENCOUNTER — EVALUATION (OUTPATIENT)
Dept: PHYSICAL THERAPY | Facility: REHABILITATION | Age: 82
End: 2022-05-04
Payer: MEDICARE

## 2022-05-04 DIAGNOSIS — G89.29 CHRONIC RIGHT SHOULDER PAIN: Primary | ICD-10-CM

## 2022-05-04 DIAGNOSIS — M19.011 PRIMARY OSTEOARTHRITIS OF RIGHT SHOULDER: ICD-10-CM

## 2022-05-04 DIAGNOSIS — M75.41 SUBACROMIAL IMPINGEMENT OF RIGHT SHOULDER: ICD-10-CM

## 2022-05-04 DIAGNOSIS — M25.511 CHRONIC RIGHT SHOULDER PAIN: Primary | ICD-10-CM

## 2022-05-04 LAB — BACTERIA UR CULT: NORMAL

## 2022-05-04 PROCEDURE — 97162 PT EVAL MOD COMPLEX 30 MIN: CPT | Performed by: PHYSICAL THERAPIST

## 2022-05-04 PROCEDURE — 97112 NEUROMUSCULAR REEDUCATION: CPT | Performed by: PHYSICAL THERAPIST

## 2022-05-04 NOTE — PROGRESS NOTES
PT Evaluation      Today's date: 2022  Patient name: Hao Mcmillan  : 1940  MRN: [id number]  Referring provider: Maegan Brambila  Dx:   Encounter Diagnosis     ICD-10-CM    1  Chronic right shoulder pain  M25 511     G89 29    2  Primary osteoarthritis of right shoulder  M19 011 Ambulatory Referral to Physical Therapy   3  Subacromial impingement of right shoulder  M75 41 Ambulatory Referral to Physical Therapy                  Assessment  Assessment details: Hao Mcmillan is a pleasant 79 y o  male who presents with R shoulder pain  The patient's greatest concerns are avoiding surgery and being able to return to playing the keyboard  No further referral appears necessary at this time based upon examination results  Primary movement impairment diagnosis of periscapular neuromuscular control deficit, shoulder weakness, thoracic spine extension hypomobility  Physical exam is consistent with R shoulder impingement syndrome based off positive findings of: Hawkin's Jeremy test, Infraspinatus test, Painful Arc test  Neuro screening is unremarkable  Physical exam is not indicative of any labral pathology contributing to current presentation  Discussion with patient about shoulder biomechanics and addressing the resting posture of his R scapular to address symptoms is well received  Pt  will benefit from skilled PT services that includes manual therapy techniques to enhance tissue extensibility, neuromuscular re-education to facilitate motor control, therapeutic exercise to increase functional mobility, and modalities prn to reduce pain and inflammation        Primary Impairments:  1) periscapular neuromuscular control deficit  2) shoulder weakness  3) thoracic spine extension hypomobility       Impairments: abnormal muscle firing, abnormal or restricted ROM, abnormal movement, activity intolerance, impaired physical strength, lacks appropriate home exercise program, pain with function, poor posture  and poor body mechanics    Symptom irritability: moderateUnderstanding of Dx/Px/POC: excellentPrognosis details: Positive prognostic indicators include positive attitude toward recovery, good understanding of POC  Goals  STG  Patient will be independent with home exercise program in 1-2 visits  Patient will tolerate 1 hour of keyboard playing in 4 weeks  Patient will demonstrate full shoulder AROM without pain in 4 weeks  LTG  Patient will be independent in comprehensive HEP upon discharge  Patient will achieve goal FOTO score upon discharge  Patient will demonstrate >4/5 MMT upon discharge  Patient will return to playing keyboard for >2 hours without restriction upon discharge  Patient will perform heavy ADLs without restriction upon discharge  Plan  Patient would benefit from: skilled physical therapy  Planned therapy interventions: activity modification, joint mobilization, manual therapy, motor coordination training, neuromuscular re-education, patient education, self care, therapeutic activities, therapeutic exercise, graded activity, home exercise program, behavior modification, graded exercise, functional ROM exercises and strengthening  Frequency: 2x week  Duration in visits: 8  Treatment plan discussed with: patient        Subjective Evaluation    History of Present Illness  Mechanism of injury: Patient presents with a 2 month hx of R shoulder pain  He did receive a CSI into his R shoulder that alleviated some symptoms  He is limited with some overhead reaching and maintaining his arm at shoulder height for extended periods of time  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 and he wants to be able to get back to playing for 2 hours at a time  He is also an avid drawer and he struggles with this as well  He has a hx of carpal tunnel release approx 1 year ago  Denies any distal numbness/parasthesias     Pain  Current pain ratin  At best pain ratin  At worst pain ratin (@ shoulder height abduction/flexion)  Location: R lateral shoulder  Quality: sharp and dull ache  Relieving factors: medications  Aggravating factors: overhead activity, keyboarding and lifting (playing the keyboard)  Progression: no change    Treatments  Previous treatment: injection treatment  Patient Goals  Patient goals for therapy: increased strength, return to sport/leisure activities, increased motion and decreased pain  Patient goal: Play the keyboard without restriction        Objective     General Comments:      Shoulder Comments   Dermatome: light touch intact  Myotome: intact    RED flags: unremarkable    Palpation: TTP R AC joint    Scapular position: R protracted and anteriorly rotated    Scapular mobility:  Flexion: R posterior axillary line  Abduction: R posterior axillary line    Scapular retraction: fair; mild B/L upper trap compensation    Clinical Tests:   [place]: pos R  Infraspinatus: pos R  Painful Arc: pos R   Drop Arm: neg R  Crank Test: neg R  Biceps Load I: neg R    Strength:   Flexion:   R 4/5 p! ER @ 0:   R 3+/5 p! ER @ 90:   R 4/5 p! IR @ 0:   R 5/5    Shoulder Active Range of Motion:   Flexion:     R WFL  Abduction:     R WFL  Functional ER:  L T1  R T1  Functional IR:   L L1  R L4 p!     Thoracic Spine Active Range of Motion:   Flexion: WFL  Extension: 75% limited               Precautions: none      Manuals 5/4            Caudal glides @ 90                                                    Neuro Re-Ed             Scapular wall slide 20x HEP            B/L scap ret 20x gtb HEP            Full can raise             Scapular protraction             Row             Split stance UE posterior chain loading @ wall                                                    Education HEP and POC            Ther Ex             UBE             Thoracic ext 1 min; HEP            Open books                                                                              Ther Activity Gait Training                                       Modalities

## 2022-05-09 ENCOUNTER — OFFICE VISIT (OUTPATIENT)
Dept: PHYSICAL THERAPY | Facility: REHABILITATION | Age: 82
End: 2022-05-09
Payer: MEDICARE

## 2022-05-09 DIAGNOSIS — G89.29 CHRONIC RIGHT SHOULDER PAIN: ICD-10-CM

## 2022-05-09 DIAGNOSIS — M75.41 SUBACROMIAL IMPINGEMENT OF RIGHT SHOULDER: Primary | ICD-10-CM

## 2022-05-09 DIAGNOSIS — M25.511 CHRONIC RIGHT SHOULDER PAIN: ICD-10-CM

## 2022-05-09 DIAGNOSIS — M19.011 PRIMARY OSTEOARTHRITIS OF RIGHT SHOULDER: ICD-10-CM

## 2022-05-09 PROCEDURE — 97140 MANUAL THERAPY 1/> REGIONS: CPT | Performed by: PHYSICAL THERAPIST

## 2022-05-09 PROCEDURE — 97112 NEUROMUSCULAR REEDUCATION: CPT | Performed by: PHYSICAL THERAPIST

## 2022-05-09 PROCEDURE — 97110 THERAPEUTIC EXERCISES: CPT | Performed by: PHYSICAL THERAPIST

## 2022-05-09 NOTE — PROGRESS NOTES
Daily Note     Today's date: 2022  Patient name: Gareth Wilson  : 1940  MRN: [id number]  Referring provider: Jennifer Amanda  Dx:   Encounter Diagnosis     ICD-10-CM    1  Subacromial impingement of right shoulder  M75 41    2  Chronic right shoulder pain  M25 511     G89 29    3  Primary osteoarthritis of right shoulder  M19 011                   Subjective: Patient has been compliant with HEP  He is challenged by B/L scap ret exercise due to reproduction of shoulder pain  He states that his neck has started cracking a lot more than usual in the past week  He states that there is no pain associated with the cracking, but it is "loud"  Objective: See treatment diary below      Assessment: B/L scap ret was attempted with ytb with poor tolerance  This exercise will be held until patient is able to tolerate without significant shoulder pain  HEP updated as charted to address RTC tendon loading and to promote scapular posture  Assess response next visit  Plan: Continue per plan of care        Precautions: none      Manuals        Caudal glides @ 90                                    Neuro Re-Ed         Scapular wall slide 20x HEP        B/L scap ret 20x gtb HEP held       ER @ wall iso  20x 5s; HEP       Full can raise         Scapular protraction         Unilateral row split stance w/ ipsilateral rotation  3x8 3s; 8#       Unilateral chest press split stance w/ contralateral rotation  3x8; 8#       Unilateral low trap liftvoff @ wall  3x8 ea; 3s       Unilateral split stance posterior shoulder girdle loading  3x5; 5s; HEP                         Education HEP and POC HEP updates       Ther Ex         UBE  6 min        Thoracic ext 1 min; HEP 2 min       Open books                                                      Ther Activity                           Gait Training                           Modalities

## 2022-05-12 ENCOUNTER — TELEPHONE (OUTPATIENT)
Dept: OBGYN CLINIC | Facility: CLINIC | Age: 82
End: 2022-05-12

## 2022-05-12 ENCOUNTER — OFFICE VISIT (OUTPATIENT)
Dept: PHYSICAL THERAPY | Facility: REHABILITATION | Age: 82
End: 2022-05-12
Payer: MEDICARE

## 2022-05-12 DIAGNOSIS — M75.41 SUBACROMIAL IMPINGEMENT OF RIGHT SHOULDER: Primary | ICD-10-CM

## 2022-05-12 DIAGNOSIS — M19.011 PRIMARY OSTEOARTHRITIS OF RIGHT SHOULDER: ICD-10-CM

## 2022-05-12 DIAGNOSIS — M25.511 CHRONIC RIGHT SHOULDER PAIN: ICD-10-CM

## 2022-05-12 DIAGNOSIS — G89.29 CHRONIC RIGHT SHOULDER PAIN: ICD-10-CM

## 2022-05-12 PROCEDURE — 97110 THERAPEUTIC EXERCISES: CPT

## 2022-05-12 PROCEDURE — 97112 NEUROMUSCULAR REEDUCATION: CPT

## 2022-05-12 NOTE — TELEPHONE ENCOUNTER
We are treating his shoulder  If he is having a separate issue with his neck, he can mention to his therapist and see someone at the spine center for his neck complaints

## 2022-05-12 NOTE — PROGRESS NOTES
Daily Note     Today's date: 2022  Patient name: Rasta Boone  : 1940  MRN: [id number]  Referring provider: Susie Mccloud  Dx:   Encounter Diagnosis     ICD-10-CM    1  Subacromial impingement of right shoulder  M75 41    2  Chronic right shoulder pain  M25 511     G89 29    3  Primary osteoarthritis of right shoulder  M19 011                   Subjective: Pt states that shoulder is feeling better, but he has had pain present in neck that is restricting motion  Pt states he has contacted  regarding neck and is waiting to hear back      Objective: See treatment diary below      Assessment: Tolerated treatment well  Patient would benefit from continued PT  Pt had pain with some motions, ER iso and shoulder girdle loading caused pain  Pt demonstrated good AROM unloaded  Pt  able to complete all exercises with no increase in pain during or after session  Pt  1:1 with PTA for entirety  Plan: Continue per plan of care        Precautions: none      Manuals  5      Caudal glides @ 90         PROM R shoulder   KP 8'                        Neuro Re-Ed         Scapular wall slide 20x HEP        B/L scap ret 20x gtb HEP held       ER @ wall iso  20x 5s; HEP 10x 5"      Full can raise         Scapular protraction         Unilateral row split stance w/ ipsilateral rotation  3x8 3s; 8# 3x8 3" 8#      Unilateral chest press split stance w/ contralateral rotation  3x8; 8# 3x8 8#      Unilateral low trap lift off @ wall  3x8 ea; 3s 2x10 3"      Unilateral split stance posterior shoulder girdle loading  3x5; 5s; HEP                         Education HEP and POC HEP updates       Ther Ex         UBE  6 min  6'      Thoracic ext 1 min; HEP 2 min 2'      Open books                                                      Ther Activity                           Gait Training                           Modalities

## 2022-05-12 NOTE — TELEPHONE ENCOUNTER
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  Please call him [phone]    Thank you

## 2022-05-12 NOTE — TELEPHONE ENCOUNTER
Spoke to patient  He stated he will continue PT and see how this goes prior to seeing spine center  He will call back if needed

## 2022-05-16 ENCOUNTER — OFFICE VISIT (OUTPATIENT)
Dept: PHYSICAL THERAPY | Facility: REHABILITATION | Age: 82
End: 2022-05-16
Payer: MEDICARE

## 2022-05-16 DIAGNOSIS — M19.011 PRIMARY OSTEOARTHRITIS OF RIGHT SHOULDER: ICD-10-CM

## 2022-05-16 DIAGNOSIS — M75.41 SUBACROMIAL IMPINGEMENT OF RIGHT SHOULDER: Primary | ICD-10-CM

## 2022-05-16 DIAGNOSIS — G89.29 CHRONIC RIGHT SHOULDER PAIN: ICD-10-CM

## 2022-05-16 DIAGNOSIS — M25.511 CHRONIC RIGHT SHOULDER PAIN: ICD-10-CM

## 2022-05-16 PROCEDURE — 97110 THERAPEUTIC EXERCISES: CPT

## 2022-05-16 PROCEDURE — 97112 NEUROMUSCULAR REEDUCATION: CPT

## 2022-05-19 ENCOUNTER — OFFICE VISIT (OUTPATIENT)
Dept: PHYSICAL THERAPY | Facility: REHABILITATION | Age: 82
End: 2022-05-19
Payer: MEDICARE

## 2022-05-19 DIAGNOSIS — G89.29 CHRONIC RIGHT SHOULDER PAIN: ICD-10-CM

## 2022-05-19 DIAGNOSIS — M25.511 CHRONIC RIGHT SHOULDER PAIN: ICD-10-CM

## 2022-05-19 DIAGNOSIS — M19.011 PRIMARY OSTEOARTHRITIS OF RIGHT SHOULDER: ICD-10-CM

## 2022-05-19 DIAGNOSIS — M75.41 SUBACROMIAL IMPINGEMENT OF RIGHT SHOULDER: Primary | ICD-10-CM

## 2022-05-19 PROCEDURE — 97112 NEUROMUSCULAR REEDUCATION: CPT

## 2022-05-19 PROCEDURE — 97140 MANUAL THERAPY 1/> REGIONS: CPT

## 2022-05-19 PROCEDURE — 97110 THERAPEUTIC EXERCISES: CPT

## 2022-05-19 NOTE — PROGRESS NOTES
Daily Note     Today's date: 2022  Patient name: Deborah Guaman  : 1940  MRN: [id number]  Referring provider: Iftikhar Nevarez  Dx:   Encounter Diagnosis     ICD-10-CM    1  Subacromial impingement of right shoulder  M75 41    2  Chronic right shoulder pain  M25 511     G89 29    3  Primary osteoarthritis of right shoulder  M19 011                   Subjective: Pt  reports no change in status upon arrival   Pt states he made an appt to see dr regarding the pain he's getting in the neck  Objective: See treatment diary below      Assessment: Tolerated treatment well  Patient would benefit from continued PT   Pt  able to complete all exercises with no increase in pain during or after session  Pt demonstrated good mobility, remains challenged with ER due to pain  Pt  1:1 with PTA for entirety  Plan: Continue per plan of care        Precautions: none      Manuals     Caudal glides @ 90         PROM R shoulder   KP 8' KP 8' KP 8'                      Neuro Re-Ed         Scapular wall slide 20x HEP        B/L scap ret 20x gtb HEP held       ER @ wall iso  20x 5s; HEP 10x 5" 20x5" 20x5"    Full can raise         Scapular protraction         Unilateral row split stance w/ ipsilateral rotation  3x8 3s; 8# 3x8 3" 8# 3x10 3" 8# 3x10 3" 8#    Unilateral chest press split stance w/ contralateral rotation  3x8; 8# 3x8 8# 3x10 8#  3x10 8#    Unilateral low trap lift off @ wall  3x8 ea; 3s 2x10 3" 2x10 3" 2x10 3"    Unilateral split stance posterior shoulder girdle loading  3x5; 5s; HEP                         Education HEP and POC HEP updates       Ther Ex         UBE  6 min  6' 6' 6' fwd    Thoracic ext 1 min; HEP 2 min 2' 2' 2'    Open books                                                      Ther Activity                           Gait Training                           Modalities

## 2022-05-23 ENCOUNTER — OFFICE VISIT (OUTPATIENT)
Dept: INTERNAL MEDICINE CLINIC | Facility: CLINIC | Age: 82
End: 2022-05-23
Payer: MEDICARE

## 2022-05-23 ENCOUNTER — APPOINTMENT (OUTPATIENT)
Dept: RADIOLOGY | Age: 82
End: 2022-05-23
Payer: MEDICARE

## 2022-05-23 ENCOUNTER — OFFICE VISIT (OUTPATIENT)
Dept: PHYSICAL THERAPY | Facility: REHABILITATION | Age: 82
End: 2022-05-23
Payer: MEDICARE

## 2022-05-23 ENCOUNTER — TELEPHONE (OUTPATIENT)
Dept: INTERNAL MEDICINE CLINIC | Facility: CLINIC | Age: 82
End: 2022-05-23

## 2022-05-23 VITALS
BODY MASS INDEX: 28.03 KG/M2 | HEIGHT: 66 IN | HEART RATE: 89 BPM | DIASTOLIC BLOOD PRESSURE: 70 MMHG | OXYGEN SATURATION: 97 % | WEIGHT: 174.4 LBS | TEMPERATURE: 98.1 F | SYSTOLIC BLOOD PRESSURE: 136 MMHG

## 2022-05-23 DIAGNOSIS — I10 ESSENTIAL HYPERTENSION: ICD-10-CM

## 2022-05-23 DIAGNOSIS — M54.2 NECK PAIN: ICD-10-CM

## 2022-05-23 DIAGNOSIS — M25.511 CHRONIC RIGHT SHOULDER PAIN: ICD-10-CM

## 2022-05-23 DIAGNOSIS — G89.29 CHRONIC RIGHT SHOULDER PAIN: ICD-10-CM

## 2022-05-23 DIAGNOSIS — M75.41 SUBACROMIAL IMPINGEMENT OF RIGHT SHOULDER: Primary | ICD-10-CM

## 2022-05-23 DIAGNOSIS — G43.109 MIGRAINE WITH AURA AND WITHOUT STATUS MIGRAINOSUS, NOT INTRACTABLE: ICD-10-CM

## 2022-05-23 DIAGNOSIS — N41.0 ACUTE PROSTATITIS: ICD-10-CM

## 2022-05-23 DIAGNOSIS — M19.011 PRIMARY OSTEOARTHRITIS OF RIGHT SHOULDER: ICD-10-CM

## 2022-05-23 DIAGNOSIS — M54.2 NECK PAIN: Primary | ICD-10-CM

## 2022-05-23 PROCEDURE — 99214 OFFICE O/P EST MOD 30 MIN: CPT | Performed by: INTERNAL MEDICINE

## 2022-05-23 PROCEDURE — 97112 NEUROMUSCULAR REEDUCATION: CPT | Performed by: PHYSICAL THERAPIST

## 2022-05-23 PROCEDURE — 72050 X-RAY EXAM NECK SPINE 4/5VWS: CPT

## 2022-05-23 PROCEDURE — 97110 THERAPEUTIC EXERCISES: CPT | Performed by: PHYSICAL THERAPIST

## 2022-05-23 NOTE — PROGRESS NOTES
Assessment/Plan:           1  Neck pain  Comments: Will need to rule out severe degenerative disc disease of the cervical spine  Orders:  -     XR spine cervical complete 4 or 5 vw non injury; Future; Expected date: 05/23/2022    2  Migraine with aura and without status migrainosus, not intractable  Comments: This may be related to the neuralgia in his neck  3  Essential hypertension  Comments:  Currently numbers are acceptable with amlodipine and irbesartan  4  Acute prostatitis           1  Neck pain      2  Migraine with aura and without status migrainosus, not intractable      3  Essential hypertension         No problem-specific Assessment & Plan notes found for this encounter  Subjective:      Patient ID: Juanita Anton is a 79 y o  male  HPI    The following portions of the patient's history were reviewed and updated as appropriate: He  has a past medical history of Arthritis, Basal cell carcinoma, Hypertension, Kidney stone, Skin cancer, and Squamous cell carcinoma of skin  He   Patient Active Problem List    Diagnosis Date Noted    Subacromial impingement of right shoulder 04/25/2022    Primary osteoarthritis of right shoulder 04/25/2022    PVD (peripheral vascular disease) ([facility] [address] ) 04/14/2021    Lumbar stenosis with neurogenic claudication     BPH associated with nocturia 12/14/2015    White coat syndrome with diagnosis of hypertension 05/18/2012    Mixed hyperlipidemia 05/18/2012     He  has a past surgical history that includes Kidney stone surgery (1982); Meniscectomy (2000); Tonsillectomy and adenoidectomy (1940); and pr wrist arthroscop,release xvers lig (Right, 1/19/2021)  His family history includes Arthritis in his family; Breast cancer in his mother; Cancer in his family; Heart disease in his father; Hypertension in his family  He  reports that he has never smoked   He has never used smokeless tobacco  He reports that he does not drink alcohol and does not use drugs   Current Outpatient Medications   Medication Sig Dispense Refill    acetaminophen (TYLENOL) 650 mg CR tablet Take 1 tablet (650 mg total) by mouth every 8 (eight) hours as needed for mild pain 30 tablet 0    amLODIPine (NORVASC) 5 mg tablet TAKE 1 TABLET BY MOUTH  DAILY 90 tablet 3    Cholecalciferol (VITAMIN D3) 87527 units TABS Take 2,000 Units by mouth daily       Coenzyme Q10 (COQ10) 100 MG CAPS Take 200 mg by mouth daily      irbesartan (AVAPRO) 150 mg tablet Take 1 tablet (150 mg total) by mouth daily at bedtime 90 tablet 1    magnesium gluconate (MAGONATE) 500 mg tablet Take 1,000 mg by mouth daily        multivitamin (THERAGRAN) TABS Take 1 tablet by mouth daily      Omega-3 Fatty Acids (OMEGA-3 CF) 1000 MG CAPS Take 1 capsule by mouth daily      naproxen sodium (ALEVE) 220 MG tablet Take 1 tablet (220 mg total) by mouth 2 (two) times a day with meals (Patient not taking: Reported on 5/23/2022) 20 tablet 0     No current facility-administered medications for this visit       Current Outpatient Medications on File Prior to Visit   Medication Sig    acetaminophen (TYLENOL) 650 mg CR tablet Take 1 tablet (650 mg total) by mouth every 8 (eight) hours as needed for mild pain    amLODIPine (NORVASC) 5 mg tablet TAKE 1 TABLET BY MOUTH  DAILY    Cholecalciferol (VITAMIN D3) 94138 units TABS Take 2,000 Units by mouth daily     Coenzyme Q10 (COQ10) 100 MG CAPS Take 200 mg by mouth daily    irbesartan (AVAPRO) 150 mg tablet Take 1 tablet (150 mg total) by mouth daily at bedtime    magnesium gluconate (MAGONATE) 500 mg tablet Take 1,000 mg by mouth daily      multivitamin (THERAGRAN) TABS Take 1 tablet by mouth daily    Omega-3 Fatty Acids (OMEGA-3 CF) 1000 MG CAPS Take 1 capsule by mouth daily    naproxen sodium (ALEVE) 220 MG tablet Take 1 tablet (220 mg total) by mouth 2 (two) times a day with meals (Patient not taking: Reported on 5/23/2022)     No current facility-administered medications on file prior to visit  He is allergic to sulfate       Review of Systems   Constitutional: Negative for appetite change, chills, fatigue and fever  HENT: Negative for sore throat and trouble swallowing  Eyes: Negative for redness  Respiratory: Negative for shortness of breath  Cardiovascular: Negative for chest pain and palpitations  Gastrointestinal: Negative for abdominal pain, constipation and diarrhea  Genitourinary: Negative for dysuria and hematuria  Musculoskeletal: Positive for neck pain  Negative for back pain  Skin: Negative for rash  Neurological: Positive for headaches  Negative for seizures and weakness  Hematological: Negative for adenopathy  Psychiatric/Behavioral: Negative for confusion  The patient is not nervous/anxious  Objective:      /70 (BP Location: Left arm, Patient Position: Sitting, Cuff Size: Adult)   Pulse 89   Temp 98 1 °F (36 7 °C) (Temporal)   Ht 5' 6" (1 676 m)   Wt 79 1 kg (174 lb 6 4 oz)   SpO2 97% Comment: ra  BMI 28 15 kg/m²     Results Reviewed     None          Recent Results (from the past 1344 hour(s))   POCT urine dip auto non-scope    Collection Time: 05/02/22  1:01 PM   Result Value Ref Range     COLOR,UA yellow     CLARITY,UA clear     SPECIFIC GRAVITY,UA 1 015      PH,UA 6 0     LEUKOCYTE ESTERASE,UA Neg     NITRITE,UA Neg     GLUCOSE, UA Neg     KETONES,UA Neg     BILIRUBIN,UA Neg     BLOOD,UA Neg     POCT URINE PROTEIN Neg     SL AMB POCT UROBILINOGEN 0 2    Urine culture    Collection Time: 05/02/22  1:07 PM    Specimen: Urine, Clean Catch   Result Value Ref Range    Urine Culture No Growth <1000 cfu/mL         Physical Exam  Constitutional:       General: He is not in acute distress  Appearance: Normal appearance  HENT:      Head: Normocephalic and atraumatic  Nose: Nose normal       Mouth/Throat:      Mouth: Mucous membranes are moist    Eyes:      Extraocular Movements: Extraocular movements intact  Pupils: Pupils are equal, round, and reactive to light  Cardiovascular:      Rate and Rhythm: Normal rate and regular rhythm  Pulses: Normal pulses  Heart sounds: Normal heart sounds  No murmur heard  No friction rub  Pulmonary:      Effort: Pulmonary effort is normal  No respiratory distress  Breath sounds: Normal breath sounds  No wheezing  Abdominal:      General: Abdomen is flat  Bowel sounds are normal  There is no distension  Palpations: Abdomen is soft  There is no mass  Tenderness: There is no abdominal tenderness  There is no guarding  Musculoskeletal:         General: Normal range of motion  Cervical back: Normal range of motion  Tenderness present  Skin:     General: Skin is warm  Neurological:      General: No focal deficit present  Mental Status: He is alert and oriented to person, place, and time  Mental status is at baseline  Cranial Nerves: No cranial nerve deficit     Psychiatric:         Mood and Affect: Mood normal          Behavior: Behavior normal

## 2022-05-23 NOTE — PROGRESS NOTES
Daily Note     Today's date: 2022  Patient name: Jan Cook  : 1940  MRN: [id number]  Referring provider: Pearl Rasmussen  Dx:   Encounter Diagnosis     ICD-10-CM    1  Subacromial impingement of right shoulder  M75 41    2  Primary osteoarthritis of right shoulder  M19 011    3  Chronic right shoulder pain  M25 511     G89 29                   Subjective: Patient has been noticing improvement in his shoulder overall  He still has not attempted keyboard playing, which he will attempt tonight  Objective: See treatment diary below      Assessment: Patient requires tactile and verbal cueing throghout tx for appropriate periscapular control without compensatory movements  Updated HEP to progress strengthening and motor control  Patient is appropriately challenged by interventions as charted, reports muscular fatigue at the end of tx  Continue to progress strengthening and motor control interventions to address primary complaint of not being able to play keyboard  Plan: Continue per plan of care        Precautions: none      Manuals    Caudal glides @ 90                           Neuro Re-Ed         Scapular wall slide 20x HEP        B/L scap ret 20x gtb HEP held       ER @ wall iso  20x 5s; HEP 10x 5" 20x5" 20x5"    Full can raise         Scapular protraction         Unilateral row split stance w/ ipsilateral rotation  3x8 3s; 8# 3x8 3" 8# 3x10 3" 8# 3x10 3" 8# 3x10 ea; 10#   Unilateral chest press split stance w/ contralateral rotation  3x8; 8# 3x8 8# 3x10 8#  3x10 8# 3x10 ea; 10#   Unilateral low trap lift off @ wall  3x8 ea; 3s 2x10 3" 2x10 3" 2x10 3"    Unilateral split stance posterior shoulder girdle loading  3x5; 5s; HEP       Hor abd @ 90      2x10; 3s; otb; HEP   Full can raise (hor abd)      2x10; 3s; otb; HEP            Education HEP and POC HEP updates    HEP updates   Ther Ex         UBE  6 min  6' 6' 6' fwd 6 min fwd   Thoracic ext 1 min; HEP 2 min 2' 2' 2'    Open books                                                      Ther Activity         90/90 hold      3x30s; 5#            Gait Training                           Modalities

## 2022-05-25 ENCOUNTER — TELEPHONE (OUTPATIENT)
Dept: INTERNAL MEDICINE CLINIC | Facility: CLINIC | Age: 82
End: 2022-05-25

## 2022-05-25 NOTE — TELEPHONE ENCOUNTER
----- Message from Rayray Michaud MD sent at 5/25/2022  1:57 PM EDT -----  Please tell him that there is severe degeneration in his cervical spine   ----- Message -----  From: Interface, Radiology Results In  Sent: 5/25/2022   1:53 PM EDT  To: Rayray Michaud MD

## 2022-05-26 ENCOUNTER — OFFICE VISIT (OUTPATIENT)
Dept: PHYSICAL THERAPY | Facility: REHABILITATION | Age: 82
End: 2022-05-26
Payer: MEDICARE

## 2022-05-26 DIAGNOSIS — G89.29 CHRONIC RIGHT SHOULDER PAIN: ICD-10-CM

## 2022-05-26 DIAGNOSIS — M25.511 CHRONIC RIGHT SHOULDER PAIN: ICD-10-CM

## 2022-05-26 DIAGNOSIS — M19.011 PRIMARY OSTEOARTHRITIS OF RIGHT SHOULDER: ICD-10-CM

## 2022-05-26 DIAGNOSIS — M75.41 SUBACROMIAL IMPINGEMENT OF RIGHT SHOULDER: Primary | ICD-10-CM

## 2022-05-26 PROCEDURE — 97110 THERAPEUTIC EXERCISES: CPT

## 2022-05-26 PROCEDURE — 97112 NEUROMUSCULAR REEDUCATION: CPT

## 2022-05-26 NOTE — PROGRESS NOTES
Daily Note     Today's date: 2022  Patient name: Lauren Best  : 1940  MRN: [id number]  Referring provider: Alfreda Mckeon  Dx:   Encounter Diagnosis     ICD-10-CM    1  Subacromial impingement of right shoulder  M75 41    2  Primary osteoarthritis of right shoulder  M19 011    3  Chronic right shoulder pain  M25 511     G89 29                   Subjective: Pt states that he was told he has severe degeneration in his cervical spine, hasn't received additional info yet  Objective: See treatment diary below      Assessment: Tolerated treatment well  Patient would benefit from continued PT   Pt  able to complete all exercises with no increase in pain during or after session  Pt will continue with PT pending results of dr visit for neck, will advise us of any changes needed  Pt  1:1 with PTA for entirety  Plan: Continue per plan of care        Precautions: none      Manuals    Caudal glides @ 90                     Neuro Re-Ed       Scapular wall slide       B/L scap ret       ER @ wall iso 20x5" 20x5"     Full can raise       Scapular protraction       Unilateral row split stance w/ ipsilateral rotation 3x10 3" 8# 3x10 3" 8# 3x10 ea; 10# 3x10 ea 10#   Unilateral chest press split stance w/ contralateral rotation 3x10 8#  3x10 8# 3x10 ea; 10# 3x10 ea 10#   Unilateral low trap lift off @ wall 2x10 3" 2x10 3"     Unilateral split stance posterior shoulder girdle loading       Hor abd @ 90   2x10; 3s; otb; HEP 2x10 3" otb   Full can raise (hor abd)   2x10; 3s; otb; HEP 2x10 3" otb          Education   HEP updates    Ther Ex       UBE 6' 6' fwd 6 min fwd 6' fwd   Thoracic ext 2' 2'     Open books                                          Ther Activity       90/90 hold   3x30s; 5# 3x30" 5#          Gait Training                     Modalities

## 2022-06-01 ENCOUNTER — OFFICE VISIT (OUTPATIENT)
Dept: PHYSICAL THERAPY | Facility: REHABILITATION | Age: 82
End: 2022-06-01
Payer: MEDICARE

## 2022-06-01 DIAGNOSIS — M25.511 CHRONIC RIGHT SHOULDER PAIN: ICD-10-CM

## 2022-06-01 DIAGNOSIS — M75.41 SUBACROMIAL IMPINGEMENT OF RIGHT SHOULDER: Primary | ICD-10-CM

## 2022-06-01 DIAGNOSIS — M19.011 PRIMARY OSTEOARTHRITIS OF RIGHT SHOULDER: ICD-10-CM

## 2022-06-01 DIAGNOSIS — G89.29 CHRONIC RIGHT SHOULDER PAIN: ICD-10-CM

## 2022-06-01 PROCEDURE — 97110 THERAPEUTIC EXERCISES: CPT | Performed by: PHYSICAL THERAPIST

## 2022-06-01 PROCEDURE — 97112 NEUROMUSCULAR REEDUCATION: CPT | Performed by: PHYSICAL THERAPIST

## 2022-06-01 NOTE — PROGRESS NOTES
Daily Note     Today's date: 2022  Patient name: Daren Harris  : 1940  MRN: [id number]  Referring provider: Vlad Brody  Dx:   Encounter Diagnosis     ICD-10-CM    1  Subacromial impingement of right shoulder  M75 41    2  Primary osteoarthritis of right shoulder  M19 011    3  Chronic right shoulder pain  M25 511     G89 29                   Subjective: Patient reports that his shoulder and his neck have both been feeling "really good" lately  He has been able to perform yard work and heavy lifting without significant restriction form his shoulder  Objective: See treatment diary below      Assessment: Patient is appropriately challenged by interventions as charted  He requires tactile cueing for appropriate scapular control during row and press exercises, he compensates with excessive shoulder extension and scapular elevation  Patient is fatigued at end of tx without reproduction of symptoms  Plan: Continue per plan of care        Precautions: none      Manuals    Caudal glides @ 90                        Neuro Re-Ed        Scapular wall slide        B/L scap ret        ER @ wall iso 20x5" 20x5"      Full can raise        Scapular protraction        Unilateral row split stance w/ ipsilateral rotation 3x10 3" 8# 3x10 3" 8# 3x10 ea; 10# 3x10 ea 10# 4x8 ea; 15#   Unilateral chest press split stance w/ contralateral rotation 3x10 8#  3x10 8# 3x10 ea; 10# 3x10 ea 10# 4x8 ea; 13#   Unilateral low trap lift off @ wall 2x10 3" 2x10 3"      Unilateral split stance posterior shoulder girdle loading        Hor abd @ 90   2x10; 3s; otb; HEP 2x10 3" otb    Full can raise (hor abd)   2x10; 3s; otb; HEP 2x10 3" otb            Education   HEP updates     Ther Ex        UBE 6' 6' fwd 6 min fwd 6' fwd 6 min fwd   Thoracic ext 2' 2'      Open books        KB halos     3x8 ea; 15#                                   Ther Activity        90/90 hold   3x30s; 5# 3x30" 5# 4x30s 5#           Gait Training                        Modalities

## 2022-06-03 ENCOUNTER — OFFICE VISIT (OUTPATIENT)
Dept: PHYSICAL THERAPY | Facility: REHABILITATION | Age: 82
End: 2022-06-03
Payer: MEDICARE

## 2022-06-03 DIAGNOSIS — M75.41 SUBACROMIAL IMPINGEMENT OF RIGHT SHOULDER: ICD-10-CM

## 2022-06-03 DIAGNOSIS — M19.011 PRIMARY OSTEOARTHRITIS OF RIGHT SHOULDER: Primary | ICD-10-CM

## 2022-06-03 DIAGNOSIS — G89.29 CHRONIC RIGHT SHOULDER PAIN: ICD-10-CM

## 2022-06-03 DIAGNOSIS — M25.511 CHRONIC RIGHT SHOULDER PAIN: ICD-10-CM

## 2022-06-03 PROCEDURE — 97112 NEUROMUSCULAR REEDUCATION: CPT | Performed by: PHYSICAL THERAPIST

## 2022-06-03 PROCEDURE — 97110 THERAPEUTIC EXERCISES: CPT | Performed by: PHYSICAL THERAPIST

## 2022-06-03 NOTE — PROGRESS NOTES
Daily Note     Today's date: 6/3/2022  Patient name: Long Vail  : 1940  MRN: [id number]  Referring provider: Nestor Gil  Dx:   Encounter Diagnosis     ICD-10-CM    1  Primary osteoarthritis of right shoulder  M19 011    2  Subacromial impingement of right shoulder  M75 41    3  Chronic right shoulder pain  M25 511     G89 29                   Subjective: Patient reports good tolerance to last tx  He had complete resolution of symptoms for approx 2 days after last visit  Reports some very minor shoulder soreness this morning  Objective: See treatment diary below      Assessment: Patient is appropriately challenged by interventions as charted  Patient notes some minor shoulder soreness at the end of tx, which could be attributed to an increase in volume of interventions  Patient will follow up in approx 2 weeks to determine his ability to self manage symptoms  Plan: Continue per plan of care        Precautions: none      Manuals 5/19 5/23 5/26 6/1 6/3   Caudal glides @ 90                        Neuro Re-Ed        Scapular wall slide        B/L scap ret        ER @ wall iso 20x5"       Full can raise        Scapular protraction        Unilateral row split stance w/ ipsilateral rotation 3x10 3" 8# 3x10 ea; 10# 3x10 ea 10# 4x8 ea; 15# 4x8 ea; 15#   Unilateral chest press split stance w/ contralateral rotation 3x10 8# 3x10 ea; 10# 3x10 ea 10# 4x8 ea; 13# 4x8 ea; 13#   Unilateral low trap lift off @ wall 2x10 3"       Unilateral split stance posterior shoulder girdle loading        Hor abd @ 90  2x10; 3s; otb; HEP 2x10 3" otb     Full can raise (hor abd)  2x10; 3s; otb; HEP 2x10 3" otb             Education  HEP updates      Ther Ex        UBE 6' fwd 6 min fwd 6' fwd 6 min fwd 6 min fwd   Thoracic ext 2'       Open books        KB halos    3x8 ea; 15# 4x8 ea; 15#                                   Ther Activity        90/90 hold  3x30s; 5# 3x30" 5# 4x30s 5# 5x30s; 5#           Gait Training                        Modalities

## 2022-06-08 ENCOUNTER — 6 MONTH FOLLOW UP (OUTPATIENT)
Dept: URBAN - METROPOLITAN AREA CLINIC 6 | Facility: CLINIC | Age: 82
End: 2022-06-08

## 2022-06-08 DIAGNOSIS — H35.3131: ICD-10-CM

## 2022-06-08 DIAGNOSIS — H57.811: ICD-10-CM

## 2022-06-08 DIAGNOSIS — H25.813: ICD-10-CM

## 2022-06-08 PROCEDURE — 92134 CPTRZ OPH DX IMG PST SGM RTA: CPT

## 2022-06-08 PROCEDURE — 92014 COMPRE OPH EXAM EST PT 1/>: CPT

## 2022-06-08 ASSESSMENT — TONOMETRY
OS_IOP_MMHG: 13
OD_IOP_MMHG: 11

## 2022-06-08 ASSESSMENT — VISUAL ACUITY
OS_CC: J1+
OS_CC: 20/25-2
OD_GLARE: 20/50
OD_CC: 20/30-1
OD_CC: J1
OS_GLARE: 20/50

## 2022-06-21 NOTE — PROGRESS NOTES
Patient has not returned for discharge appointment  Current POC will be D/C at this time  Patient may return for future re-evaluation and tx as required

## 2022-06-23 DIAGNOSIS — I10 ESSENTIAL HYPERTENSION: ICD-10-CM

## 2022-06-23 RX ORDER — IRBESARTAN 150 MG/1
150 TABLET ORAL
Qty: 90 TABLET | Refills: 1 | Status: SHIPPED | OUTPATIENT
Start: 2022-06-23

## 2022-06-27 ENCOUNTER — OFFICE VISIT (OUTPATIENT)
Dept: OBGYN CLINIC | Facility: OTHER | Age: 82
End: 2022-06-27
Payer: MEDICARE

## 2022-06-27 VITALS
WEIGHT: 164.4 LBS | HEIGHT: 66 IN | HEART RATE: 86 BPM | DIASTOLIC BLOOD PRESSURE: 69 MMHG | SYSTOLIC BLOOD PRESSURE: 115 MMHG | BODY MASS INDEX: 26.42 KG/M2

## 2022-06-27 DIAGNOSIS — M75.41 SUBACROMIAL IMPINGEMENT OF RIGHT SHOULDER: Primary | ICD-10-CM

## 2022-06-27 DIAGNOSIS — M19.011 PRIMARY OSTEOARTHRITIS OF RIGHT SHOULDER: ICD-10-CM

## 2022-06-27 PROCEDURE — 99213 OFFICE O/P EST LOW 20 MIN: CPT | Performed by: PHYSICIAN ASSISTANT

## 2022-06-27 NOTE — PROGRESS NOTES
Assessment  Diagnoses and all orders for this visit:    Subacromial impingement of right shoulder    Primary osteoarthritis of right shoulder        Discussion and Plan:    Justen Masker has made excellent progress since last visit  He is able to use right shoulder without pain  He can resume all activities to tolerance  1  Continue with HEP as instructed by therapist  2  Activities to tolerance  3  Follow up: as needed  If symptoms return or regress, Justen Masker will contact our office  Subjective:   Patient ID: Hao Mcmillan is a 79 y o  male      Justen Masker presents to the office in follow up of his right shoulder  He was seen 9 weeks ago for impingement syndrome  He was provided a steroid injection for pain relief and referred to physical therapy  He reports complete resolution of his right shoulder pain  Denies pain with ADLs  Denies pain at night  He is able to use the right arm for all activities without pain or limitation  He is pleased with his progress  Denies new injury or trauma  Denies numbness or tingling  The following portions of the patient's history were reviewed and updated as appropriate: allergies, current medications, past family history, past medical history, past social history, past surgical history and problem list     Review of Systems   Constitutional: Negative for chills and fever  HENT: Negative for hearing loss  Eyes: Negative for visual disturbance  Respiratory: Negative for shortness of breath  Cardiovascular: Negative for chest pain  Gastrointestinal: Negative for abdominal pain  Musculoskeletal:        As reviewed in the HPI   Skin: Negative for rash  Neurological:        As reviewed in the HPI   Psychiatric/Behavioral: Negative for agitation         Objective:  /69 (BP Location: Left arm, Patient Position: Sitting, Cuff Size: Adult)   Pulse 86   Ht 5' 6" (1 676 m)   Wt 74 6 kg (164 lb 6 4 oz)   BMI 26 53 kg/m²       Right Shoulder Exam Tenderness   The patient is experiencing no tenderness  Range of Motion   The patient has normal right shoulder ROM      Muscle Strength   External rotation: 5/5   Supraspinatus: 5/5   Subscapularis: 5/5     Tests   Covarrubias test: negative  Impingement: negative    Other   Erythema: absent  Sensation: normal  Pulse: present

## 2022-06-30 ENCOUNTER — TELEPHONE (OUTPATIENT)
Dept: INTERNAL MEDICINE CLINIC | Facility: CLINIC | Age: 82
End: 2022-06-30

## 2022-06-30 NOTE — TELEPHONE ENCOUNTER
He tested positive for covid on Tuesday and wants to know what he can do for his sore throat other than gargle

## 2022-06-30 NOTE — TELEPHONE ENCOUNTER
I recommend supportive therapy with as needed Tylenol, saltwater gargle, multiple steam inhalations in a day, OTC cough medications like Robitussin or Mucinex  He could possibly be eligible for COVID antiviral therapy which will require a virtual visit, if he is interested

## 2022-07-19 ENCOUNTER — RA CDI HCC (OUTPATIENT)
Dept: OTHER | Facility: HOSPITAL | Age: 82
End: 2022-07-19

## 2022-07-19 NOTE — PROGRESS NOTES
[place] [address]  coding opportunities       Chart reviewed, no opportunity found: CHART REVIEWED, NO OPPORTUNITY FOUND        Patients Insurance     Medicare Insurance: Medicare

## 2022-07-25 ENCOUNTER — OFFICE VISIT (OUTPATIENT)
Dept: INTERNAL MEDICINE CLINIC | Facility: CLINIC | Age: 82
End: 2022-07-25
Payer: MEDICARE

## 2022-07-25 VITALS
HEART RATE: 83 BPM | SYSTOLIC BLOOD PRESSURE: 130 MMHG | BODY MASS INDEX: 26.74 KG/M2 | DIASTOLIC BLOOD PRESSURE: 70 MMHG | OXYGEN SATURATION: 95 % | WEIGHT: 166.4 LBS | HEIGHT: 66 IN | TEMPERATURE: 98.8 F

## 2022-07-25 DIAGNOSIS — G43.109 MIGRAINE WITH AURA AND WITHOUT STATUS MIGRAINOSUS, NOT INTRACTABLE: ICD-10-CM

## 2022-07-25 DIAGNOSIS — R35.1 BPH ASSOCIATED WITH NOCTURIA: ICD-10-CM

## 2022-07-25 DIAGNOSIS — I10 ESSENTIAL HYPERTENSION: Primary | ICD-10-CM

## 2022-07-25 DIAGNOSIS — N40.1 BPH ASSOCIATED WITH NOCTURIA: ICD-10-CM

## 2022-07-25 DIAGNOSIS — H69.91 DISORDER OF RIGHT EUSTACHIAN TUBE: ICD-10-CM

## 2022-07-25 DIAGNOSIS — R73.01 IMPAIRED FASTING BLOOD SUGAR: ICD-10-CM

## 2022-07-25 DIAGNOSIS — H61.22 IMPACTED CERUMEN OF LEFT EAR: ICD-10-CM

## 2022-07-25 PROCEDURE — 99214 OFFICE O/P EST MOD 30 MIN: CPT | Performed by: INTERNAL MEDICINE

## 2022-07-25 RX ORDER — LORATADINE 10 MG/1
10 TABLET ORAL DAILY
Qty: 30 TABLET | Refills: 1 | Status: SHIPPED | OUTPATIENT
Start: 2022-07-25 | End: 2022-09-19

## 2022-07-25 NOTE — PROGRESS NOTES
Assessment/Plan:           1  Essential hypertension  Comments:  Home blood pressures are within normal limits continue amlodipine and irbesartan continue to monitor at home  2  Migraine with aura and without status migrainosus, not intractable    3  Impaired fasting blood sugar    4  BPH associated with nocturia    5  Disorder of right eustachian tube  Comments:  Claritin 10 mg in the morning and nasal saline spray  was advised  Orders:  -     loratadine (CLARITIN) 10 mg tablet; Take 1 tablet (10 mg total) by mouth daily    6  Impacted cerumen of left ear  Comments:  Debrox ear drops recommended  Orders:  -     carbamide peroxide (DEBROX) 6 5 % otic solution; Administer 5 drops into the left ear daily at bedtime         1  Essential hypertension      2  Migraine with aura and without status migrainosus, not intractable      3  Impaired fasting blood sugar         No problem-specific Assessment & Plan notes found for this encounter  Subjective:      Patient ID: Hayden Eason is a 79 y o  male  HPI    The following portions of the patient's history were reviewed and updated as appropriate: He  has a past medical history of Arthritis, Basal cell carcinoma, Hypertension, Kidney stone, Skin cancer, and Squamous cell carcinoma of skin  He   Patient Active Problem List    Diagnosis Date Noted    Subacromial impingement of right shoulder 04/25/2022    Primary osteoarthritis of right shoulder 04/25/2022    PVD (peripheral vascular disease) ([facility] [address] ) 04/14/2021    Lumbar stenosis with neurogenic claudication     BPH associated with nocturia 12/14/2015    White coat syndrome with diagnosis of hypertension 05/18/2012    Mixed hyperlipidemia 05/18/2012     He  has a past surgical history that includes Kidney stone surgery (1982); Meniscectomy (2000); Tonsillectomy and adenoidectomy (1940); and pr wrist arthroscop,release xvers lig (Right, 1/19/2021)    His family history includes Arthritis in his family; Breast cancer in his mother; Cancer in his family; Heart disease in his father; Hypertension in his family  He  reports that he has never smoked  He has never used smokeless tobacco  He reports that he does not drink alcohol and does not use drugs  Current Outpatient Medications   Medication Sig Dispense Refill    amLODIPine (NORVASC) 5 mg tablet TAKE 1 TABLET BY MOUTH  DAILY 90 tablet 3    carbamide peroxide (DEBROX) 6 5 % otic solution Administer 5 drops into the left ear daily at bedtime 15 mL 0    Cholecalciferol (VITAMIN D3) 40519 units TABS Take 2,000 Units by mouth daily       Coenzyme Q10 (COQ10) 100 MG CAPS Take 200 mg by mouth daily      irbesartan (AVAPRO) 150 mg tablet Take 1 tablet (150 mg total) by mouth daily at bedtime 90 tablet 1    loratadine (CLARITIN) 10 mg tablet Take 1 tablet (10 mg total) by mouth daily 30 tablet 1    magnesium gluconate (MAGONATE) 500 mg tablet Take 1,000 mg by mouth daily        multivitamin (THERAGRAN) TABS Take 1 tablet by mouth daily      Omega-3 Fatty Acids (OMEGA-3 CF) 1000 MG CAPS Take 1 capsule by mouth daily      acetaminophen (TYLENOL) 650 mg CR tablet Take 1 tablet (650 mg total) by mouth every 8 (eight) hours as needed for mild pain (Patient not taking: Reported on 7/25/2022) 30 tablet 0     No current facility-administered medications for this visit       Current Outpatient Medications on File Prior to Visit   Medication Sig    amLODIPine (NORVASC) 5 mg tablet TAKE 1 TABLET BY MOUTH  DAILY    Cholecalciferol (VITAMIN D3) 71355 units TABS Take 2,000 Units by mouth daily     Coenzyme Q10 (COQ10) 100 MG CAPS Take 200 mg by mouth daily    irbesartan (AVAPRO) 150 mg tablet Take 1 tablet (150 mg total) by mouth daily at bedtime    magnesium gluconate (MAGONATE) 500 mg tablet Take 1,000 mg by mouth daily      multivitamin (THERAGRAN) TABS Take 1 tablet by mouth daily    Omega-3 Fatty Acids (OMEGA-3 CF) 1000 MG CAPS Take 1 capsule by mouth daily    acetaminophen (TYLENOL) 650 mg CR tablet Take 1 tablet (650 mg total) by mouth every 8 (eight) hours as needed for mild pain (Patient not taking: Reported on 7/25/2022)     No current facility-administered medications on file prior to visit  He is allergic to sulfate       Review of Systems   Constitutional: Negative for appetite change, chills, fatigue and fever  HENT: Negative for sore throat and trouble swallowing  Ear blocked sometimes gurgling   Eyes: Negative for redness  Respiratory: Negative for shortness of breath  Cardiovascular: Negative for chest pain and palpitations  Gastrointestinal: Negative for abdominal pain, constipation and diarrhea  Genitourinary: Negative for dysuria and hematuria  Musculoskeletal: Negative for back pain and neck pain  Skin: Negative for rash  Neurological: Negative for seizures, weakness and headaches  Hematological: Negative for adenopathy  Psychiatric/Behavioral: Negative for confusion  The patient is not nervous/anxious  Objective:      /70   Pulse 83   Temp 98 8 °F (37 1 °C) (Temporal)   Ht 5' 6" (1 676 m)   Wt 75 5 kg (166 lb 6 4 oz)   SpO2 95% Comment: RA  BMI 26 86 kg/m²     Results Reviewed     None          No results found for this or any previous visit (from the past 1344 hour(s))  Physical Exam  Constitutional:       General: He is not in acute distress  Appearance: Normal appearance  HENT:      Head: Normocephalic and atraumatic  Left Ear: There is impacted cerumen  Nose: Nose normal       Mouth/Throat:      Mouth: Mucous membranes are moist    Eyes:      Extraocular Movements: Extraocular movements intact  Pupils: Pupils are equal, round, and reactive to light  Cardiovascular:      Rate and Rhythm: Normal rate and regular rhythm  Pulses: Normal pulses  Heart sounds: Normal heart sounds  No murmur heard  No friction rub     Pulmonary:      Effort: Pulmonary effort is normal  No respiratory distress  Breath sounds: Normal breath sounds  No wheezing  Abdominal:      General: Abdomen is flat  Bowel sounds are normal  There is no distension  Palpations: Abdomen is soft  There is no mass  Tenderness: There is no abdominal tenderness  There is no guarding  Musculoskeletal:         General: Normal range of motion  Neurological:      General: No focal deficit present  Mental Status: He is alert and oriented to person, place, and time  Mental status is at baseline  Cranial Nerves: No cranial nerve deficit     Psychiatric:         Mood and Affect: Mood normal          Behavior: Behavior normal

## 2022-09-19 ENCOUNTER — OFFICE VISIT (OUTPATIENT)
Dept: INTERNAL MEDICINE CLINIC | Facility: CLINIC | Age: 82
End: 2022-09-19
Payer: MEDICARE

## 2022-09-19 VITALS
DIASTOLIC BLOOD PRESSURE: 72 MMHG | HEART RATE: 85 BPM | TEMPERATURE: 98.4 F | WEIGHT: 172 LBS | HEIGHT: 66 IN | SYSTOLIC BLOOD PRESSURE: 130 MMHG | BODY MASS INDEX: 27.64 KG/M2 | OXYGEN SATURATION: 97 %

## 2022-09-19 DIAGNOSIS — R35.1 BPH ASSOCIATED WITH NOCTURIA: ICD-10-CM

## 2022-09-19 DIAGNOSIS — R73.01 IMPAIRED FASTING BLOOD SUGAR: ICD-10-CM

## 2022-09-19 DIAGNOSIS — G43.109 MIGRAINE WITH AURA AND WITHOUT STATUS MIGRAINOSUS, NOT INTRACTABLE: ICD-10-CM

## 2022-09-19 DIAGNOSIS — I73.9 PVD (PERIPHERAL VASCULAR DISEASE) (HCC): ICD-10-CM

## 2022-09-19 DIAGNOSIS — H61.22 IMPACTED CERUMEN OF LEFT EAR: ICD-10-CM

## 2022-09-19 DIAGNOSIS — N40.1 BPH ASSOCIATED WITH NOCTURIA: ICD-10-CM

## 2022-09-19 DIAGNOSIS — I10 ESSENTIAL HYPERTENSION: Primary | ICD-10-CM

## 2022-09-19 DIAGNOSIS — Z23 NEED FOR INFLUENZA VACCINATION: ICD-10-CM

## 2022-09-19 DIAGNOSIS — Z00.00 MEDICARE ANNUAL WELLNESS VISIT, SUBSEQUENT: ICD-10-CM

## 2022-09-19 PROCEDURE — G0008 ADMIN INFLUENZA VIRUS VAC: HCPCS

## 2022-09-19 PROCEDURE — 90662 IIV NO PRSV INCREASED AG IM: CPT

## 2022-09-19 PROCEDURE — G0439 PPPS, SUBSEQ VISIT: HCPCS | Performed by: INTERNAL MEDICINE

## 2022-09-19 PROCEDURE — 99214 OFFICE O/P EST MOD 30 MIN: CPT | Performed by: INTERNAL MEDICINE

## 2022-09-19 NOTE — PROGRESS NOTES
Assessment and Plan:     Problem List Items Addressed This Visit        Cardiovascular and Mediastinum    PVD (peripheral vascular disease) ([facility] [address] )       Other    BPH associated with nocturia      Other Visit Diagnoses     Essential hypertension    -  Primary    Relevant Orders    CBC and differential    Comprehensive metabolic panel    Lipid panel    Urinalysis with microscopic    Migraine with aura and without status migrainosus, not intractable        Impaired fasting blood sugar        Relevant Orders    Hemoglobin A1C    Medicare annual wellness visit, subsequent        Need for influenza vaccination        Relevant Orders    FLUZONE HIGH-DOSE: influenza vaccine, high-dose, preservative-free 0 7 mL (Completed)    Impacted cerumen of left ear        Debrox ear drops recommended    Relevant Medications    carbamide peroxide (DEBROX) 6 5 % otic solution           Preventive health issues were discussed with patient, and age appropriate screening tests were ordered as noted in patient's After Visit Summary  Personalized health advice and appropriate referrals for health education or preventive services given if needed, as noted in patient's After Visit Summary  History of Present Illness:     Patient presents for a Medicare Wellness Visit    This is 71-year-old gentleman with a history of hypertension shoulder osteoarthritis hyperlipidemia BPH lumbar spondylosis  Currently he denies any chest pain or shortness of breath  Patient Care Team:  Jessy Chaves MD as PCP - General (Internal Medicine)  Svitlana Gunn MD     Review of Systems:     Review of Systems   Constitutional: Negative for appetite change, chills, fatigue and fever  HENT: Negative for ear pain, sore throat and trouble swallowing  Eyes: Negative for pain, redness and visual disturbance  Respiratory: Negative for cough and shortness of breath  Cardiovascular: Negative for chest pain and palpitations     Gastrointestinal: Negative for abdominal pain, constipation, diarrhea and vomiting  Genitourinary: Negative for dysuria and hematuria  Musculoskeletal: Negative for arthralgias, back pain and neck pain  Skin: Negative for color change and rash  Neurological: Negative for seizures, syncope, weakness and headaches  Hematological: Negative for adenopathy  Psychiatric/Behavioral: Negative for confusion  The patient is not nervous/anxious  All other systems reviewed and are negative         Problem List:     Patient Active Problem List   Diagnosis    BPH associated with nocturia    White coat syndrome with diagnosis of hypertension    Mixed hyperlipidemia    Lumbar stenosis with neurogenic claudication    [facility]    Subacromial impingement of right shoulder    Primary osteoarthritis of right shoulder      Past Medical and Surgical History:     Past Medical History:   Diagnosis Date    Arthritis     Basal cell carcinoma     Hypertension     Kidney stone     Skin cancer     Squamous cell carcinoma of skin      Past Surgical History:   Procedure Laterality Date    KIDNEY STONE SURGERY  1982    MENISCECTOMY  2000    DE WRIST Meño Heman LIG Right 1/19/2021    Procedure: RELEASE CARPAL TUNNEL ENDOSCOPIC;  Surgeon: Christopher Goodwin MD;  Location: [facility];  Service: Orthopedics    TONSILLECTOMY AND ADENOIDECTOMY  1940      Family History:     Family History   Problem Relation Age of Onset    Arthritis Family     Hypertension Family     Cancer Family     Breast cancer Mother     Heart disease Father       Social History:     Social History     Socioeconomic History    Marital status: /Civil Union     Spouse name: None    Number of children: 3    Years of education: None    Highest education level: None   Occupational History    Occupation: Retired   Tobacco Use    Smoking status: Never Smoker    Smokeless tobacco: Never Used   Vaping Use    Vaping Use: Never used   Substance and Sexual Activity    Alcohol use: Never    Drug use: Never    Sexual activity: Not Currently   Other Topics Concern    None   Social History Narrative    None     Social Determinants of Health     Financial Resource Strain: Low Risk     Difficulty of Paying Living Expenses: Not hard at all   Food Insecurity: Not on file   Transportation Needs: No Transportation Needs    Lack of Transportation (Medical): No    Lack of Transportation (Non-Medical): No   Physical Activity: Not on file   Stress: Not on file   Social Connections: Not on file   Intimate Partner Violence: Not on file   Housing Stability: Not on file      Medications and Allergies:     Current Outpatient Medications   Medication Sig Dispense Refill    acetaminophen (TYLENOL) 650 mg CR tablet Take 1 tablet (650 mg total) by mouth every 8 (eight) hours as needed for mild pain 30 tablet 0    amLODIPine (NORVASC) 5 mg tablet TAKE 1 TABLET BY MOUTH  DAILY 90 tablet 3    carbamide peroxide (DEBROX) 6 5 % otic solution Administer 5 drops into the left ear daily at bedtime 15 mL 0    Cholecalciferol (VITAMIN D3) 07836 units TABS Take 2,000 Units by mouth daily       Coenzyme Q10 (COQ10) 100 MG CAPS Take 200 mg by mouth daily      irbesartan (AVAPRO) 150 mg tablet Take 1 tablet (150 mg total) by mouth daily at bedtime 90 tablet 1    multivitamin (THERAGRAN) TABS Take 1 tablet by mouth daily      Omega-3 Fatty Acids (OMEGA-3 CF) 1000 MG CAPS Take 1 capsule by mouth daily       No current facility-administered medications for this visit       Allergies   Allergen Reactions    Sulfate       Immunizations:     Immunization History   Administered Date(s) Administered    COVID-19 PFIZER VACCINE 0 3 ML IM 03/04/2021, 03/26/2021, 12/07/2021    INFLUENZA 10/30/2008, 11/06/2021    Influenza Injectable, MDCK, Preservative Free, Quadrivalent, 0 5 mL 09/16/2020    Influenza, high dose seasonal 0 7 mL 09/19/2022    Influenza, seasonal, injectable 10/31/2011, 11/06/2013, 10/01/2015    Pneumococcal Conjugate 13-Valent 08/17/2015    Pneumococcal Polysaccharide PPV23 01/01/2008    Td (adult), adsorbed 03/01/1999    Tdap 08/16/2017, 11/05/2020    Zoster Vaccine Recombinant 02/23/2020, 06/22/2020      Health Maintenance: There are no preventive care reminders to display for this patient  Topic Date Due    COVID-19 Vaccine (4 - Booster for Pfizer series) 04/07/2022      Medicare Screening Tests and Risk Assessments:     Ernie Jane is here for his Subsequent Wellness visit  Health Risk Assessment:   Patient rates overall health as good  Patient feels that their physical health rating is same  Patient is satisfied with their life  Eyesight was rated as same  Hearing was rated as same  Patient feels that their emotional and mental health rating is same  Patients states they are never, rarely angry  Patient states they are never, rarely unusually tired/fatigued  Pain experienced in the last 7 days has been none  Patient states that he has experienced no weight loss or gain in last 6 months  Depression Screening:   PHQ-2 Score: 0      Fall Risk Screening: In the past year, patient has experienced: no history of falling in past year      Home Safety:  Patient does not have trouble with stairs inside or outside of their home  Patient has working smoke alarms and has no working carbon monoxide detector  Home safety hazards include: none  Nutrition:   Current diet is Regular and Limited junk food  Medications:   Patient is currently taking over-the-counter supplements  OTC medications include: see medication list  Patient is able to manage medications  Activities of Daily Living (ADLs)/Instrumental Activities of Daily Living (IADLs):   Walk and transfer into and out of bed and chair?: Yes  Dress and groom yourself?: Yes    Bathe or shower yourself?: Yes    Feed yourself?  Yes  Do your laundry/housekeeping?: Yes  Manage your money, pay your bills and track your expenses?: Yes  Make your own meals?: Yes    Do your own shopping?: Yes    Previous Hospitalizations:   Any hospitalizations or ED visits within the last 12 months?: No      Advance Care Planning:   Living will: Yes    Advanced directive: Yes      PREVENTIVE SCREENINGS      Cardiovascular Screening:    General: Screening Not Indicated and History Lipid Disorder      Diabetes Screening:     General: Screening Current      Prostate Cancer Screening:    General: Screening Not Indicated      Lung Cancer Screening:     General: Screening Not Indicated    Screening, Brief Intervention, and Referral to Treatment (SBIRT)    Screening  Typical number of drinks in a day: 0  Typical number of drinks in a week: 0  Interpretation: Low risk drinking behavior  Single Item Drug Screening:  How often have you used an illegal drug (including marijuana) or a prescription medication for non-medical reasons in the past year? never    Single Item Drug Screen Score: 0  Interpretation: Negative screen for possible drug use disorder    Other Counseling Topics:   Car/seat belt/driving safety, skin self-exam, sunscreen and regular weightbearing exercise and calcium and vitamin D intake  No exam data present     Physical Exam:     /72   Pulse 85   Temp 98 4 °F (36 9 °C) (Temporal)   Ht 5' 6" (1 676 m)   Wt 78 kg (172 lb)   SpO2 97% Comment: RA  BMI 27 76 kg/m²     Physical Exam  Vitals and nursing note reviewed  Constitutional:       Appearance: He is well-developed  HENT:      Head: Normocephalic and atraumatic  Left Ear: There is impacted cerumen  Mouth/Throat:      Mouth: Mucous membranes are moist    Eyes:      Conjunctiva/sclera: Conjunctivae normal    Cardiovascular:      Rate and Rhythm: Normal rate and regular rhythm  Heart sounds: No murmur heard  Pulmonary:      Effort: Pulmonary effort is normal  No respiratory distress  Breath sounds: Normal breath sounds  Abdominal:      General: Abdomen is flat  Palpations: Abdomen is soft  Tenderness: There is no abdominal tenderness  Musculoskeletal:         General: Normal range of motion  Cervical back: Normal range of motion and neck supple  Skin:     General: Skin is warm and dry  Neurological:      General: No focal deficit present  Mental Status: He is alert and oriented to person, place, and time  Mental status is at baseline            Destiny Merrill MD

## 2022-12-08 ENCOUNTER — 6 MONTH FOLLOW UP (OUTPATIENT)
Dept: URBAN - METROPOLITAN AREA CLINIC 6 | Facility: CLINIC | Age: 82
End: 2022-12-08

## 2022-12-08 DIAGNOSIS — H35.3131: ICD-10-CM

## 2022-12-08 DIAGNOSIS — H25.813: ICD-10-CM

## 2022-12-08 DIAGNOSIS — H57.811: ICD-10-CM

## 2022-12-08 PROCEDURE — 92014 COMPRE OPH EXAM EST PT 1/>: CPT

## 2022-12-08 PROCEDURE — 92134 CPTRZ OPH DX IMG PST SGM RTA: CPT

## 2022-12-08 ASSESSMENT — VISUAL ACUITY
OD_CC: 20/30-1
OD_CC: J1+
OS_CC: 20/30
OS_CC: J1-2

## 2022-12-08 ASSESSMENT — TONOMETRY
OS_IOP_MMHG: 15
OD_IOP_MMHG: 13

## 2022-12-13 LAB
ALBUMIN SERPL-MCNC: 4.2 G/DL (ref 3.6–5.1)
ALBUMIN/GLOB SERPL: 1.6 (CALC) (ref 1–2.5)
ALP SERPL-CCNC: 72 U/L (ref 35–144)
ALT SERPL-CCNC: 17 U/L (ref 9–46)
APPEARANCE UR: CLEAR
AST SERPL-CCNC: 19 U/L (ref 10–35)
BACTERIA UR QL AUTO: NORMAL /HPF
BASOPHILS # BLD AUTO: 92 CELLS/UL (ref 0–200)
BASOPHILS NFR BLD AUTO: 1.3 %
BILIRUB SERPL-MCNC: 0.7 MG/DL (ref 0.2–1.2)
BILIRUB UR QL STRIP: NEGATIVE
BUN SERPL-MCNC: 25 MG/DL (ref 7–25)
BUN/CREAT SERPL: ABNORMAL (CALC) (ref 6–22)
CALCIUM SERPL-MCNC: 9.3 MG/DL (ref 8.6–10.3)
CHLORIDE SERPL-SCNC: 101 MMOL/L (ref 98–110)
CHOLEST SERPL-MCNC: 161 MG/DL
CHOLEST/HDLC SERPL: 3.2 (CALC)
CO2 SERPL-SCNC: 29 MMOL/L (ref 20–32)
COLOR UR: YELLOW
CREAT SERPL-MCNC: 1 MG/DL (ref 0.7–1.22)
EOSINOPHIL # BLD AUTO: 369 CELLS/UL (ref 15–500)
EOSINOPHIL NFR BLD AUTO: 5.2 %
ERYTHROCYTE [DISTWIDTH] IN BLOOD BY AUTOMATED COUNT: 12.5 % (ref 11–15)
GFR/BSA.PRED SERPLBLD CYS-BASED-ARV: 75 ML/MIN/1.73M2
GLOBULIN SER CALC-MCNC: 2.6 G/DL (CALC) (ref 1.9–3.7)
GLUCOSE SERPL-MCNC: 115 MG/DL (ref 65–99)
GLUCOSE UR QL STRIP: NEGATIVE
HBA1C MFR BLD: 5.7 % OF TOTAL HGB
HCT VFR BLD AUTO: 44 % (ref 38.5–50)
HDLC SERPL-MCNC: 50 MG/DL
HGB BLD-MCNC: 15.1 G/DL (ref 13.2–17.1)
HGB UR QL STRIP: NEGATIVE
HYALINE CASTS #/AREA URNS LPF: NORMAL /LPF
KETONES UR QL STRIP: NEGATIVE
LDLC SERPL CALC-MCNC: 89 MG/DL (CALC)
LEUKOCYTE ESTERASE UR QL STRIP: NEGATIVE
LYMPHOCYTES # BLD AUTO: 2116 CELLS/UL (ref 850–3900)
LYMPHOCYTES NFR BLD AUTO: 29.8 %
MCH RBC QN AUTO: 30.6 PG (ref 27–33)
MCHC RBC AUTO-ENTMCNC: 34.3 G/DL (ref 32–36)
MCV RBC AUTO: 89.2 FL (ref 80–100)
MONOCYTES # BLD AUTO: 781 CELLS/UL (ref 200–950)
MONOCYTES NFR BLD AUTO: 11 %
NEUTROPHILS # BLD AUTO: 3742 CELLS/UL (ref 1500–7800)
NEUTROPHILS NFR BLD AUTO: 52.7 %
NITRITE UR QL STRIP: NEGATIVE
NONHDLC SERPL-MCNC: 111 MG/DL (CALC)
PH UR STRIP: 7 [PH] (ref 5–8)
PLATELET # BLD AUTO: 281 THOUSAND/UL (ref 140–400)
PMV BLD REES-ECKER: 11 FL (ref 7.5–12.5)
POTASSIUM SERPL-SCNC: 4.4 MMOL/L (ref 3.5–5.3)
PROT SERPL-MCNC: 6.8 G/DL (ref 6.1–8.1)
PROT UR QL STRIP: NEGATIVE
RBC # BLD AUTO: 4.93 MILLION/UL (ref 4.2–5.8)
RBC #/AREA URNS HPF: NORMAL /HPF
SODIUM SERPL-SCNC: 136 MMOL/L (ref 135–146)
SP GR UR STRIP: 1.01 (ref 1–1.03)
SQUAMOUS #/AREA URNS HPF: NORMAL /HPF
TRIGL SERPL-MCNC: 119 MG/DL
WBC # BLD AUTO: 7.1 THOUSAND/UL (ref 3.8–10.8)
WBC #/AREA URNS HPF: NORMAL /HPF

## 2022-12-19 ENCOUNTER — OFFICE VISIT (OUTPATIENT)
Dept: INTERNAL MEDICINE CLINIC | Facility: CLINIC | Age: 82
End: 2022-12-19

## 2022-12-19 VITALS — WEIGHT: 175 LBS | HEIGHT: 66 IN | BODY MASS INDEX: 28.12 KG/M2

## 2022-12-19 DIAGNOSIS — H61.22 IMPACTED CERUMEN OF LEFT EAR: ICD-10-CM

## 2022-12-19 DIAGNOSIS — I10 ESSENTIAL HYPERTENSION: Primary | ICD-10-CM

## 2022-12-19 DIAGNOSIS — R73.01 IMPAIRED FASTING BLOOD SUGAR: ICD-10-CM

## 2022-12-19 DIAGNOSIS — G89.29 CHRONIC PAIN OF BOTH SHOULDERS: ICD-10-CM

## 2022-12-19 DIAGNOSIS — N40.1 BPH ASSOCIATED WITH NOCTURIA: ICD-10-CM

## 2022-12-19 DIAGNOSIS — R35.1 BPH ASSOCIATED WITH NOCTURIA: ICD-10-CM

## 2022-12-19 DIAGNOSIS — M25.511 CHRONIC PAIN OF BOTH SHOULDERS: ICD-10-CM

## 2022-12-19 DIAGNOSIS — M25.512 CHRONIC PAIN OF BOTH SHOULDERS: ICD-10-CM

## 2022-12-19 RX ORDER — IRBESARTAN 150 MG/1
150 TABLET ORAL
Qty: 90 TABLET | Refills: 3 | Status: SHIPPED | OUTPATIENT
Start: 2022-12-19

## 2022-12-19 RX ORDER — AMLODIPINE BESYLATE 5 MG/1
5 TABLET ORAL DAILY
Qty: 90 TABLET | Refills: 3 | Status: SHIPPED | OUTPATIENT
Start: 2022-12-19

## 2023-01-10 ENCOUNTER — OFFICE VISIT (OUTPATIENT)
Dept: OBGYN CLINIC | Facility: OTHER | Age: 83
End: 2023-01-10

## 2023-01-10 ENCOUNTER — APPOINTMENT (OUTPATIENT)
Dept: RADIOLOGY | Facility: OTHER | Age: 83
End: 2023-01-10

## 2023-01-10 VITALS
BODY MASS INDEX: 28.45 KG/M2 | WEIGHT: 177 LBS | SYSTOLIC BLOOD PRESSURE: 125 MMHG | DIASTOLIC BLOOD PRESSURE: 72 MMHG | HEART RATE: 102 BPM | HEIGHT: 66 IN

## 2023-01-10 DIAGNOSIS — M25.512 ACUTE PAIN OF LEFT SHOULDER: ICD-10-CM

## 2023-01-10 DIAGNOSIS — M75.42 IMPINGEMENT SYNDROME OF LEFT SHOULDER: Primary | ICD-10-CM

## 2023-01-10 DIAGNOSIS — M19.012 PRIMARY OSTEOARTHRITIS OF LEFT SHOULDER: ICD-10-CM

## 2023-01-10 RX ORDER — BUPIVACAINE HYDROCHLORIDE 2.5 MG/ML
2 INJECTION, SOLUTION INFILTRATION; PERINEURAL
Status: COMPLETED | OUTPATIENT
Start: 2023-01-10 | End: 2023-01-10

## 2023-01-10 RX ORDER — BETAMETHASONE SODIUM PHOSPHATE AND BETAMETHASONE ACETATE 3; 3 MG/ML; MG/ML
6 INJECTION, SUSPENSION INTRA-ARTICULAR; INTRALESIONAL; INTRAMUSCULAR; SOFT TISSUE
Status: COMPLETED | OUTPATIENT
Start: 2023-01-10 | End: 2023-01-10

## 2023-01-10 RX ADMIN — BETAMETHASONE SODIUM PHOSPHATE AND BETAMETHASONE ACETATE 6 MG: 3; 3 INJECTION, SUSPENSION INTRA-ARTICULAR; INTRALESIONAL; INTRAMUSCULAR; SOFT TISSUE at 13:46

## 2023-01-10 RX ADMIN — BUPIVACAINE HYDROCHLORIDE 2 ML: 2.5 INJECTION, SOLUTION INFILTRATION; PERINEURAL at 13:46

## 2023-01-10 NOTE — PATIENT INSTRUCTIONS

## 2023-01-10 NOTE — PROGRESS NOTES
Assessment  Diagnoses and all orders for this visit:    Impingement syndrome of left shoulder    Primary osteoarthritis of left shoulder          Discussion and Plan:  The patient has an examination consistent with subacromial impingement syndrome of the left shoulder  I have discussed with the patient the pathophysiology of this diagnosis and reviewed how the examination correlates with this diagnosis  Treatment options were discussed at length and after discussing these treatment options, the patient elected for and received a subacromial injection of corticosteroid (as described in the procedure note) and continue HEP learned in PT  We will reevaluate the patient in 6-8 weeks  If the symptoms fail to improve with this treatment the patient would be indicated for further imaging in the form of an MRI scan of the shoulder  There is some mild degenerative change of the glenohumeral joint on x-ray but that does not appear to be his main symptom generator at this time      Subjective:   Patient ID: Lord Mckeon is a 79 y o  male      HPI  The patient presents with a chief complaint of left shoulder pain  The pain began a few month(s) ago and is not associated with an acute injury  The patient describes the pain as aching, dull and sharp in intensity,  intermittent in timing, and localizes the pain to the  left subacromial joint  The pain is worse with movement and overuse and relieved by rest   The pain is not associated with numbness and tingling  The pain is not associated with constitutional symptoms  The patient is awoken at night by the pain  The following portions of the patient's history were reviewed and updated as appropriate: allergies, current medications, past family history, past medical history, past social history, past surgical history and problem list     Review of Systems   Constitutional: Negative for chills and fever  HENT: Negative for drooling and hearing loss  Eyes: Negative for visual disturbance  Respiratory: Negative for cough and shortness of breath  Cardiovascular: Negative for chest pain  Gastrointestinal: Negative for abdominal pain  Skin: Negative for rash  Psychiatric/Behavioral: Negative for agitation  Objective:  /72 (BP Location: Left arm, Patient Position: Sitting, Cuff Size: Adult)   Pulse 102   Ht 5' 6" (1 676 m)   Wt 80 3 kg (177 lb)   BMI 28 57 kg/m²       Left Shoulder Exam     Tenderness   The patient is experiencing no tenderness  Range of Motion   The patient has normal left shoulder ROM  Muscle Strength   Abduction: 4/5   External rotation: 4/5     Tests   Covarrubias test: positive  Impingement: positive    Other   Erythema: absent  Sensation: normal  Pulse: present             Physical Exam  Vitals reviewed  Constitutional:       Appearance: He is well-developed  HENT:      Head: Normocephalic  Eyes:      Pupils: Pupils are equal, round, and reactive to light  Pulmonary:      Effort: Pulmonary effort is normal    Abdominal:      General: Abdomen is flat  There is no distension  Skin:     General: Skin is warm and dry  Large joint arthrocentesis: L subacromial bursa  Universal Protocol:  Consent: Verbal consent obtained    Consent given by: patient  Patient understanding: patient states understanding of the procedure being performed  Site marked: the operative site was marked  Patient identity confirmed: verbally with patient    Supporting Documentation  Indications: pain   Procedure Details  Location: shoulder - L subacromial bursa  Needle size: 22 G  Ultrasound guidance: no  Approach: lateral  Medications administered: 2 mL bupivacaine 0 25 %; 6 mg betamethasone acetate-betamethasone sodium phosphate 6 (3-3) mg/mL    Patient tolerance: patient tolerated the procedure well with no immediate complications  Dressing:  Sterile dressing applied          I have personally reviewed pertinent films in PACS and my interpretation is as follows  Left shoulder x-rays demonstrates no fracture or dislocation, mild GH degenerative changes      Scribe Attestation    I,:  Sagar Munguia am acting as a scribe while in the presence of the attending physician :       I,:  Krystal Blake MD personally performed the services described in this documentation    as scribed in my presence :

## 2023-02-20 ENCOUNTER — TELEPHONE (OUTPATIENT)
Dept: INTERNAL MEDICINE CLINIC | Facility: CLINIC | Age: 83
End: 2023-02-20

## 2023-02-20 NOTE — TELEPHONE ENCOUNTER
patient called and stated he has had diarrhea   for the last few days and is there anything over the counter he can take

## 2023-03-16 ENCOUNTER — RA CDI HCC (OUTPATIENT)
Dept: OTHER | Facility: HOSPITAL | Age: 83
End: 2023-03-16

## 2023-03-22 ENCOUNTER — OFFICE VISIT (OUTPATIENT)
Dept: INTERNAL MEDICINE CLINIC | Facility: CLINIC | Age: 83
End: 2023-03-22

## 2023-03-22 VITALS
WEIGHT: 173 LBS | DIASTOLIC BLOOD PRESSURE: 70 MMHG | HEART RATE: 70 BPM | OXYGEN SATURATION: 98 % | HEIGHT: 66 IN | TEMPERATURE: 98.6 F | BODY MASS INDEX: 27.8 KG/M2 | SYSTOLIC BLOOD PRESSURE: 136 MMHG

## 2023-03-22 DIAGNOSIS — I10 ESSENTIAL HYPERTENSION: Primary | ICD-10-CM

## 2023-03-22 DIAGNOSIS — H61.22 IMPACTED CERUMEN OF LEFT EAR: ICD-10-CM

## 2023-03-22 DIAGNOSIS — R35.1 BPH ASSOCIATED WITH NOCTURIA: ICD-10-CM

## 2023-03-22 DIAGNOSIS — R09.82 POST-NASAL DRIP: ICD-10-CM

## 2023-03-22 DIAGNOSIS — N40.1 BPH ASSOCIATED WITH NOCTURIA: ICD-10-CM

## 2023-03-22 DIAGNOSIS — R73.01 IMPAIRED FASTING BLOOD SUGAR: ICD-10-CM

## 2023-03-22 RX ORDER — TRIAMCINOLONE ACETONIDE 1 MG/G
CREAM TOPICAL
COMMUNITY
Start: 2023-01-18

## 2023-03-22 NOTE — PROGRESS NOTES
Assessment/Plan:           1  Essential hypertension  Comments:  Stable continue irbesartan 150 mg daily and amlodipine 5 mg daily  Orders:  -     Basic metabolic panel; Future    2  Impacted cerumen of left ear  Comments:  Debrox drops recommended  3  BPH associated with nocturia    4  Impaired fasting blood sugar  Comments:  Low-carb high-fiber diet recommended    5  Post-nasal drip  Comments:  Nasal saline spray 4-5 times a day recommended during the winter months  Orders:  -     sodium chloride (OCEAN) 0 65 % nasal spray; 1 spray into each nostril every 3 (three) hours         1  Essential hypertension      2  Impacted cerumen of left ear      3  BPH associated with nocturia         No problem-specific Assessment & Plan notes found for this encounter  Subjective:      Patient ID: Hayden Eason is a 79 y o  male  HPI    The following portions of the patient's history were reviewed and updated as appropriate: He  has a past medical history of Arthritis, Basal cell carcinoma, Hypertension, Kidney stone, Skin cancer, and Squamous cell carcinoma of skin  He   Patient Active Problem List    Diagnosis Date Noted   • Primary osteoarthritis of left shoulder 01/10/2023   • Impingement syndrome of right shoulder 04/25/2022   • Primary osteoarthritis of right shoulder 04/25/2022   • PVD (peripheral vascular disease) ([facility] [address] ) 04/14/2021   • Lumbar stenosis with neurogenic claudication    • BPH associated with nocturia 12/14/2015   • White coat syndrome with diagnosis of hypertension 05/18/2012   • Mixed hyperlipidemia 05/18/2012     He  has a past surgical history that includes Kidney stone surgery (1982); Meniscectomy (2000); Tonsillectomy and adenoidectomy (1940); and pr ndsc wrst surg w/rls transvrs carpl ligm (Right, 1/19/2021)  His family history includes Arthritis in his family; Breast cancer in his mother; Cancer in his family; Heart disease in his father; Hypertension in his family    He  reports that he has never smoked  He has never used smokeless tobacco  He reports that he does not drink alcohol and does not use drugs  Current Outpatient Medications   Medication Sig Dispense Refill   • amLODIPine (NORVASC) 5 mg tablet Take 1 tablet (5 mg total) by mouth daily 90 tablet 3   • Cholecalciferol (VITAMIN D3) 53706 units TABS Take 2,000 Units by mouth daily      • irbesartan (AVAPRO) 150 mg tablet Take 1 tablet (150 mg total) by mouth daily at bedtime 90 tablet 3   • multivitamin (THERAGRAN) TABS Take 1 tablet by mouth daily     • NON FORMULARY RELIEF FACTOR - ICARIIN 400MG , OMEGA 2800MG, CURCUMIN 1334MG, RESERVATROL 140 MG     • sodium chloride (OCEAN) 0 65 % nasal spray 1 spray into each nostril every 3 (three) hours 60 mL 1   • acetaminophen (TYLENOL) 650 mg CR tablet Take 1 tablet (650 mg total) by mouth every 8 (eight) hours as needed for mild pain (Patient not taking: Reported on 3/22/2023) 30 tablet 0   • carbamide peroxide (DEBROX) 6 5 % otic solution Administer 5 drops into the left ear daily at bedtime (Patient not taking: Reported on 12/19/2022) 15 mL 0   • Coenzyme Q10 (COQ10) 100 MG CAPS Take 200 mg by mouth daily (Patient not taking: Reported on 3/22/2023)     • Magnesium 400 MG CAPS Take by mouth 400MG (Patient not taking: Reported on 3/22/2023)     • Omega-3 Fatty Acids (OMEGA-3 CF) 1000 MG CAPS Take 1 capsule by mouth daily (Patient not taking: Reported on 12/19/2022)     • triamcinolone (KENALOG) 0 1 % cream APPLY TOPICALLY TWICE DAILY TO AFFECTED AREA ONHANDS AND ARMS X 2WEEKS THEN REDUCE TO 2-3 X PER WEEK AS NEEDED (Patient not taking: Reported on 3/22/2023)       No current facility-administered medications for this visit       Current Outpatient Medications on File Prior to Visit   Medication Sig   • amLODIPine (NORVASC) 5 mg tablet Take 1 tablet (5 mg total) by mouth daily   • Cholecalciferol (VITAMIN D3) 92934 units TABS Take 2,000 Units by mouth daily    • irbesartan (AVAPRO) 150 mg tablet Take 1 tablet (150 mg total) by mouth daily at bedtime   • multivitamin (THERAGRAN) TABS Take 1 tablet by mouth daily   • NON FORMULARY RELIEF FACTOR - ICARIIN 400MG , OMEGA 2800MG, CURCUMIN 1334MG, RESERVATROL 140 MG   • acetaminophen (TYLENOL) 650 mg CR tablet Take 1 tablet (650 mg total) by mouth every 8 (eight) hours as needed for mild pain (Patient not taking: Reported on 3/22/2023)   • carbamide peroxide (DEBROX) 6 5 % otic solution Administer 5 drops into the left ear daily at bedtime (Patient not taking: Reported on 12/19/2022)   • Coenzyme Q10 (COQ10) 100 MG CAPS Take 200 mg by mouth daily (Patient not taking: Reported on 3/22/2023)   • Magnesium 400 MG CAPS Take by mouth 400MG (Patient not taking: Reported on 3/22/2023)   • Omega-3 Fatty Acids (OMEGA-3 CF) 1000 MG CAPS Take 1 capsule by mouth daily (Patient not taking: Reported on 12/19/2022)   • triamcinolone (KENALOG) 0 1 % cream APPLY TOPICALLY TWICE DAILY TO AFFECTED AREA ONHANDS AND ARMS X 2WEEKS THEN REDUCE TO 2-3 X PER WEEK AS NEEDED (Patient not taking: Reported on 3/22/2023)     No current facility-administered medications on file prior to visit  He is allergic to sulfate       Review of Systems   Constitutional: Negative for appetite change, chills, fatigue and fever  HENT: Negative for sore throat and trouble swallowing  Eyes: Negative for redness  Respiratory: Negative for shortness of breath  Cardiovascular: Negative for chest pain and palpitations  Gastrointestinal: Negative for abdominal pain, constipation and diarrhea  Genitourinary: Negative for dysuria and hematuria  Musculoskeletal: Positive for arthralgias  Negative for back pain and neck pain  Skin: Negative for rash  Neurological: Negative for seizures, weakness and headaches  Hematological: Negative for adenopathy  Psychiatric/Behavioral: Negative for confusion  The patient is not nervous/anxious            Objective:      /70   Pulse 70   Temp 98 6 °F (37 °C) (Temporal)   Ht 5' 6" (1 676 m)   Wt 78 5 kg (173 lb)   SpO2 98%   BMI 27 92 kg/m²     Results Reviewed     None          No results found for this or any previous visit (from the past 1344 hour(s))  Physical Exam  Constitutional:       General: He is not in acute distress  Appearance: Normal appearance  HENT:      Head: Normocephalic and atraumatic  Nose: Nose normal       Mouth/Throat:      Mouth: Mucous membranes are moist    Eyes:      Extraocular Movements: Extraocular movements intact  Pupils: Pupils are equal, round, and reactive to light  Cardiovascular:      Rate and Rhythm: Normal rate and regular rhythm  Pulses: Normal pulses  Heart sounds: Normal heart sounds  No murmur heard  No friction rub  Pulmonary:      Effort: Pulmonary effort is normal  No respiratory distress  Breath sounds: Normal breath sounds  No wheezing  Abdominal:      General: Abdomen is flat  Bowel sounds are normal  There is no distension  Palpations: Abdomen is soft  There is no mass  Tenderness: There is no abdominal tenderness  There is no guarding  Musculoskeletal:         General: Normal range of motion  Cervical back: Normal range of motion  Neurological:      General: No focal deficit present  Mental Status: He is alert and oriented to person, place, and time  Mental status is at baseline  Cranial Nerves: No cranial nerve deficit     Psychiatric:         Mood and Affect: Mood normal          Behavior: Behavior normal

## 2023-06-08 ENCOUNTER — 6 MONTH FOLLOW UP (OUTPATIENT)
Dept: URBAN - METROPOLITAN AREA CLINIC 6 | Facility: CLINIC | Age: 83
End: 2023-06-08

## 2023-06-08 DIAGNOSIS — H35.371: ICD-10-CM

## 2023-06-08 DIAGNOSIS — H25.813: ICD-10-CM

## 2023-06-08 DIAGNOSIS — H57.811: ICD-10-CM

## 2023-06-08 DIAGNOSIS — H35.3131: ICD-10-CM

## 2023-06-08 DIAGNOSIS — H04.123: ICD-10-CM

## 2023-06-08 PROCEDURE — 92134 CPTRZ OPH DX IMG PST SGM RTA: CPT

## 2023-06-08 PROCEDURE — 92014 COMPRE OPH EXAM EST PT 1/>: CPT

## 2023-06-08 ASSESSMENT — VISUAL ACUITY
OS_CC: 20/30-1
OU_CC: J1-1
OU_CC: 20/30
OD_CC: 20/30

## 2023-06-08 ASSESSMENT — TONOMETRY
OS_IOP_MMHG: 12
OD_IOP_MMHG: 11

## 2023-06-23 ENCOUNTER — OFFICE VISIT (OUTPATIENT)
Dept: INTERNAL MEDICINE CLINIC | Facility: CLINIC | Age: 83
End: 2023-06-23
Payer: MEDICARE

## 2023-06-23 VITALS
WEIGHT: 178.2 LBS | OXYGEN SATURATION: 99 % | HEART RATE: 66 BPM | TEMPERATURE: 97 F | HEIGHT: 66 IN | SYSTOLIC BLOOD PRESSURE: 140 MMHG | BODY MASS INDEX: 28.64 KG/M2 | DIASTOLIC BLOOD PRESSURE: 68 MMHG

## 2023-06-23 DIAGNOSIS — M54.12 CERVICAL RADICULOPATHY: Primary | ICD-10-CM

## 2023-06-23 DIAGNOSIS — I10 ESSENTIAL HYPERTENSION: ICD-10-CM

## 2023-06-23 PROCEDURE — 99214 OFFICE O/P EST MOD 30 MIN: CPT | Performed by: INTERNAL MEDICINE

## 2023-06-23 NOTE — PROGRESS NOTES
Assessment/Plan:           1  Cervical radiculopathy  Comments:  Differential diagnosis includes cervical radiculopathy carpal tunnel and tendinopathy  If unimproved he will proceed with nerve conduction test   Orders:  -     EMG 2 Limb Upper Extremity; Future    2  Essential hypertension  Comments:  Continue amlodipine and irbesartan continue to monitor at home  1  Cervical radiculopathy    - EMG 2 Limb Upper Extremity; Future       No problem-specific Assessment & Plan notes found for this encounter  Subjective:      Patient ID: Jacob Lopes is a 79 y o  male  HPI    The following portions of the patient's history were reviewed and updated as appropriate: He  has a past medical history of Arthritis, Basal cell carcinoma, Hypertension, Kidney stone, Skin cancer, and Squamous cell carcinoma of skin  He   Patient Active Problem List    Diagnosis Date Noted   • Primary osteoarthritis of left shoulder 01/10/2023   • Impingement syndrome of right shoulder 04/25/2022   • Primary osteoarthritis of right shoulder 04/25/2022   • PVD (peripheral vascular disease) ([facility] [address] ) 04/14/2021   • Lumbar stenosis with neurogenic claudication    • BPH associated with nocturia 12/14/2015   • White coat syndrome with diagnosis of hypertension 05/18/2012   • Mixed hyperlipidemia 05/18/2012     He  has a past surgical history that includes Kidney stone surgery (1982); Meniscectomy (2000); Tonsillectomy and adenoidectomy (1940); and pr ndsc wrst surg w/rls transvrs carpl ligm (Right, 1/19/2021)  His family history includes Arthritis in his family; Breast cancer in his mother; Cancer in his family; Heart disease in his father; Hypertension in his family  He  reports that he has never smoked  He has never used smokeless tobacco  He reports that he does not drink alcohol and does not use drugs    Current Outpatient Medications   Medication Sig Dispense Refill   • amLODIPine (NORVASC) 5 mg tablet Take 1 tablet (5 mg total) by mouth daily 90 tablet 3   • Cholecalciferol (VITAMIN D3) 05835 units TABS Take 2,000 Units by mouth daily      • Coenzyme Q10 (COQ10) 100 MG CAPS Take 200 mg by mouth daily     • irbesartan (AVAPRO) 150 mg tablet Take 1 tablet (150 mg total) by mouth daily at bedtime 90 tablet 3   • multivitamin (THERAGRAN) TABS Take 1 tablet by mouth daily     • NON FORMULARY RELIEF FACTOR - ICARIIN 400MG , OMEGA 2800MG, CURCUMIN 1334MG, RESERVATROL 140 MG     • Omega-3 Fatty Acids (OMEGA-3 CF) 1000 MG CAPS Take 1 capsule by mouth daily     • acetaminophen (TYLENOL) 650 mg CR tablet Take 1 tablet (650 mg total) by mouth every 8 (eight) hours as needed for mild pain (Patient not taking: Reported on 3/22/2023) 30 tablet 0   • carbamide peroxide (DEBROX) 6 5 % otic solution Administer 5 drops into the left ear daily at bedtime (Patient not taking: Reported on 12/19/2022) 15 mL 0   • Magnesium 400 MG CAPS Take by mouth 400MG (Patient not taking: Reported on 6/23/2023)     • sodium chloride (OCEAN) 0 65 % nasal spray 1 spray into each nostril every 3 (three) hours (Patient not taking: Reported on 4/17/2023) 60 mL 1   • triamcinolone (KENALOG) 0 1 % cream APPLY TOPICALLY TWICE DAILY TO AFFECTED AREA ONHANDS AND ARMS X 2WEEKS THEN REDUCE TO 2-3 X PER WEEK AS NEEDED (Patient not taking: Reported on 3/22/2023)       No current facility-administered medications for this visit       Current Outpatient Medications on File Prior to Visit   Medication Sig   • amLODIPine (NORVASC) 5 mg tablet Take 1 tablet (5 mg total) by mouth daily   • Cholecalciferol (VITAMIN D3) 68118 units TABS Take 2,000 Units by mouth daily    • Coenzyme Q10 (COQ10) 100 MG CAPS Take 200 mg by mouth daily   • irbesartan (AVAPRO) 150 mg tablet Take 1 tablet (150 mg total) by mouth daily at bedtime   • multivitamin (THERAGRAN) TABS Take 1 tablet by mouth daily   • NON FORMULARY RELIEF FACTOR - ICARIIN 400MG , OMEGA 2800MG, CURCUMIN 1334MG, RESERVATROL 140 MG   • "Omega-3 Fatty Acids (OMEGA-3 CF) 1000 MG CAPS Take 1 capsule by mouth daily   • acetaminophen (TYLENOL) 650 mg CR tablet Take 1 tablet (650 mg total) by mouth every 8 (eight) hours as needed for mild pain (Patient not taking: Reported on 3/22/2023)   • carbamide peroxide (DEBROX) 6 5 % otic solution Administer 5 drops into the left ear daily at bedtime (Patient not taking: Reported on 12/19/2022)   • Magnesium 400 MG CAPS Take by mouth 400MG (Patient not taking: Reported on 6/23/2023)   • sodium chloride (OCEAN) 0 65 % nasal spray 1 spray into each nostril every 3 (three) hours (Patient not taking: Reported on 4/17/2023)   • triamcinolone (KENALOG) 0 1 % cream APPLY TOPICALLY TWICE DAILY TO AFFECTED AREA ONHANDS AND ARMS X 2WEEKS THEN REDUCE TO 2-3 X PER WEEK AS NEEDED (Patient not taking: Reported on 3/22/2023)     No current facility-administered medications on file prior to visit  He is allergic to sulfate       Review of Systems   Constitutional: Negative for appetite change, chills, fatigue and fever  HENT: Negative for sore throat and trouble swallowing  Eyes: Negative for redness  Respiratory: Negative for shortness of breath  Cardiovascular: Negative for chest pain and palpitations  Gastrointestinal: Negative for abdominal pain, constipation and diarrhea  Genitourinary: Negative for dysuria and hematuria  Musculoskeletal: Positive for arthralgias  Negative for back pain and neck pain  Left forearm pain worse with driving  Skin: Negative for rash  Neurological: Negative for seizures, weakness and headaches  Hematological: Negative for adenopathy  Psychiatric/Behavioral: Negative for confusion  The patient is not nervous/anxious            Objective:      /68 (BP Location: Right arm, Patient Position: Sitting, Cuff Size: Standard)   Pulse 66   Temp (!) 97 °F (36 1 °C) (Temporal)   Ht 5' 6\" (1 676 m)   Wt 80 8 kg (178 lb 3 2 oz)   SpO2 99%   BMI 28 76 kg/m² " Results Reviewed     None          No results found for this or any previous visit (from the past 1344 hour(s))  Physical Exam  Constitutional:       General: He is not in acute distress  Appearance: Normal appearance  HENT:      Head: Normocephalic and atraumatic  Nose: Nose normal       Mouth/Throat:      Mouth: Mucous membranes are moist    Eyes:      Extraocular Movements: Extraocular movements intact  Pupils: Pupils are equal, round, and reactive to light  Cardiovascular:      Rate and Rhythm: Normal rate and regular rhythm  Pulses: Normal pulses  Heart sounds: Normal heart sounds  No murmur heard  No friction rub  Pulmonary:      Effort: Pulmonary effort is normal  No respiratory distress  Breath sounds: Normal breath sounds  No wheezing  Abdominal:      General: Abdomen is flat  Bowel sounds are normal  There is no distension  Palpations: Abdomen is soft  There is no mass  Tenderness: There is no abdominal tenderness  There is no guarding  Musculoskeletal:         General: Normal range of motion  Neurological:      General: No focal deficit present  Mental Status: He is alert and oriented to person, place, and time  Mental status is at baseline  Cranial Nerves: No cranial nerve deficit     Psychiatric:         Mood and Affect: Mood normal          Behavior: Behavior normal

## 2023-08-25 LAB
BUN SERPL-MCNC: 19 MG/DL (ref 7–25)
BUN/CREAT SERPL: ABNORMAL (CALC) (ref 6–22)
CALCIUM SERPL-MCNC: 9.7 MG/DL (ref 8.6–10.3)
CHLORIDE SERPL-SCNC: 103 MMOL/L (ref 98–110)
CO2 SERPL-SCNC: 31 MMOL/L (ref 20–32)
CREAT SERPL-MCNC: 1.11 MG/DL (ref 0.7–1.22)
GFR/BSA.PRED SERPLBLD CYS-BASED-ARV: 66 ML/MIN/1.73M2
GLUCOSE SERPL-MCNC: 110 MG/DL (ref 65–99)
POTASSIUM SERPL-SCNC: 4.4 MMOL/L (ref 3.5–5.3)
SODIUM SERPL-SCNC: 139 MMOL/L (ref 135–146)

## 2023-09-20 ENCOUNTER — OFFICE VISIT (OUTPATIENT)
Dept: INTERNAL MEDICINE CLINIC | Facility: CLINIC | Age: 83
End: 2023-09-20
Payer: MEDICARE

## 2023-09-20 VITALS
SYSTOLIC BLOOD PRESSURE: 136 MMHG | DIASTOLIC BLOOD PRESSURE: 70 MMHG | HEIGHT: 66 IN | BODY MASS INDEX: 28.8 KG/M2 | HEART RATE: 79 BPM | TEMPERATURE: 97.7 F | OXYGEN SATURATION: 97 % | WEIGHT: 179.2 LBS

## 2023-09-20 DIAGNOSIS — M54.12 CERVICAL RADICULOPATHY: ICD-10-CM

## 2023-09-20 DIAGNOSIS — I73.9 PVD (PERIPHERAL VASCULAR DISEASE) (HCC): ICD-10-CM

## 2023-09-20 DIAGNOSIS — R73.01 IMPAIRED FASTING BLOOD SUGAR: ICD-10-CM

## 2023-09-20 DIAGNOSIS — I10 ESSENTIAL HYPERTENSION: Primary | ICD-10-CM

## 2023-09-20 DIAGNOSIS — G89.29 CHRONIC BILATERAL LOW BACK PAIN WITHOUT SCIATICA: ICD-10-CM

## 2023-09-20 DIAGNOSIS — E78.2 MIXED HYPERLIPIDEMIA: ICD-10-CM

## 2023-09-20 DIAGNOSIS — Z00.00 MEDICARE ANNUAL WELLNESS VISIT, SUBSEQUENT: ICD-10-CM

## 2023-09-20 DIAGNOSIS — Z23 FLU VACCINE NEED: ICD-10-CM

## 2023-09-20 DIAGNOSIS — H61.22 IMPACTED CERUMEN OF LEFT EAR: ICD-10-CM

## 2023-09-20 DIAGNOSIS — M54.50 CHRONIC BILATERAL LOW BACK PAIN WITHOUT SCIATICA: ICD-10-CM

## 2023-09-20 PROCEDURE — G0008 ADMIN INFLUENZA VIRUS VAC: HCPCS

## 2023-09-20 PROCEDURE — 99214 OFFICE O/P EST MOD 30 MIN: CPT | Performed by: INTERNAL MEDICINE

## 2023-09-20 PROCEDURE — G0439 PPPS, SUBSEQ VISIT: HCPCS | Performed by: INTERNAL MEDICINE

## 2023-09-20 PROCEDURE — 90662 IIV NO PRSV INCREASED AG IM: CPT

## 2023-09-20 NOTE — PROGRESS NOTES
Assessment and Plan:     Problem List Items Addressed This Visit        Cardiovascular and Mediastinum    PVD (peripheral vascular disease) ([address])       Other    Mixed hyperlipidemia    Relevant Orders    Lipid panel   Other Visit Diagnoses     Essential hypertension    -  Primary    Stable continue amlodipine and irbesartan. Relevant Orders    CBC and differential    Comprehensive metabolic panel    UA (URINE) with reflex to Scope    TSH, 3rd generation    Flu vaccine need        Relevant Orders    influenza vaccine, high-dose, PF 0.7 mL (FLUZONE HIGH-DOSE) (Completed)    Cervical radiculopathy        Impacted cerumen of left ear        Debrox drops recommended    Medicare annual wellness visit, subsequent        Chronic bilateral low back pain without sciatica        Impaired fasting blood sugar        Continue low sugar diabetic diet. Relevant Orders    Hemoglobin A1C           Preventive health issues were discussed with patient, and age appropriate screening tests were ordered as noted in patient's After Visit Summary. Personalized health advice and appropriate referrals for health education or preventive services given if needed, as noted in patient's After Visit Summary. History of Present Illness:     Patient presents for a Medicare Wellness Visit    This 79-year-old gentleman with a history of hypertension lumbar spinal stenosis osteoarthritis. He denies any chest pain or shortness of breath     Patient Care Team:  Elidia Lira MD as PCP - General (Internal Medicine)  Doris Saavedra MD     Review of Systems:     Review of Systems   Constitutional: Negative for appetite change, chills, fatigue and fever. HENT: Negative for sore throat and trouble swallowing. Eyes: Negative for redness. Respiratory: Negative for shortness of breath. Cardiovascular: Negative for chest pain and palpitations. Gastrointestinal: Negative for abdominal pain, constipation and diarrhea.    Genitourinary: Negative for dysuria and hematuria. Musculoskeletal: Negative for back pain and neck pain. Skin: Negative for rash. Neurological: Negative for seizures, weakness and headaches. Hematological: Negative for adenopathy. Psychiatric/Behavioral: Negative for confusion. The patient is not nervous/anxious.          Problem List:     Patient Active Problem List   Diagnosis   • BPH associated with nocturia   • White coat syndrome with diagnosis of hypertension   • Mixed hyperlipidemia   • Lumbar stenosis with neurogenic claudication   • PVD (peripheral vascular disease) ([facility])   • Impingement syndrome of right shoulder   • Primary osteoarthritis of right shoulder   • Primary osteoarthritis of left shoulder      Past Medical and Surgical History:     Past Medical History:   Diagnosis Date   • Arthritis    • Basal cell carcinoma    • Hypertension    • Kidney stone    • Skin cancer    • Squamous cell carcinoma of skin      Past Surgical History:   Procedure Laterality Date   • KIDNEY STONE SURGERY  1982   • MENISCECTOMY  2000   • CT NDSC WRST SURG W/RLS TRANSVRS CARPL LIGM Right 1/19/2021    Procedure: RELEASE CARPAL TUNNEL ENDOSCOPIC;  Surgeon: Jorge De Jesus MD;  Location: [facility];  Service: Orthopedics   • TONSILLECTOMY AND ADENOIDECTOMY  1940      Family History:     Family History   Problem Relation Age of Onset   • Arthritis Family    • Hypertension Family    • Cancer Family    • Breast cancer Mother    • Heart disease Father       Social History:     Social History     Socioeconomic History   • Marital status: /Civil Union     Spouse name: None   • Number of children: 3   • Years of education: None   • Highest education level: None   Occupational History   • Occupation: Retired   Tobacco Use   • Smoking status: Never   • Smokeless tobacco: Never   Vaping Use   • Vaping Use: Never used   Substance and Sexual Activity   • Alcohol use: Never   • Drug use: Never   • Sexual activity: Not Currently Other Topics Concern   • None   Social History Narrative   • None     Social Determinants of Health     Financial Resource Strain: Low Risk  (9/20/2023)    Overall Financial Resource Strain (CARDIA)    • Difficulty of Paying Living Expenses: Not hard at all   Food Insecurity: Not on file   Transportation Needs: No Transportation Needs (9/20/2023)    PRAPARE - Transportation    • Lack of Transportation (Medical): No    • Lack of Transportation (Non-Medical):  No   Physical Activity: Not on file   Stress: Not on file   Social Connections: Not on file   Intimate Partner Violence: Not on file   Housing Stability: Not on file      Medications and Allergies:     Current Outpatient Medications   Medication Sig Dispense Refill   • amLODIPine (NORVASC) 5 mg tablet Take 1 tablet (5 mg total) by mouth daily 90 tablet 3   • Cholecalciferol (VITAMIN D3) 77246 units TABS Take 2,000 Units by mouth daily      • Coenzyme Q10 (COQ10) 100 MG CAPS Take 200 mg by mouth daily     • irbesartan (AVAPRO) 150 mg tablet Take 1 tablet (150 mg total) by mouth daily at bedtime 90 tablet 3   • multivitamin (THERAGRAN) TABS Take 1 tablet by mouth daily     • NON FORMULARY RELIEF FACTOR - ICARIIN 400MG , OMEGA 2800MG, CURCUMIN 1334MG, RESERVATROL 140 MG     • Omega-3 Fatty Acids (OMEGA-3 CF) 1000 MG CAPS Take 1 capsule by mouth daily     • acetaminophen (TYLENOL) 650 mg CR tablet Take 1 tablet (650 mg total) by mouth every 8 (eight) hours as needed for mild pain (Patient not taking: Reported on 3/22/2023) 30 tablet 0   • carbamide peroxide (DEBROX) 6.5 % otic solution Administer 5 drops into the left ear daily at bedtime (Patient not taking: Reported on 12/19/2022) 15 mL 0   • Magnesium 400 MG CAPS Take by mouth 400MG (Patient not taking: Reported on 6/23/2023)     • sodium chloride (OCEAN) 0.65 % nasal spray 1 spray into each nostril every 3 (three) hours (Patient not taking: Reported on 4/17/2023) 60 mL 1   • triamcinolone (KENALOG) 0.1 % cream APPLY TOPICALLY TWICE DAILY TO AFFECTED AREA ONHANDS AND ARMS X 2WEEKS THEN REDUCE TO 2-3 X PER WEEK AS NEEDED (Patient not taking: Reported on 3/22/2023)       No current facility-administered medications for this visit. Allergies   Allergen Reactions   • Sulfate       Immunizations:     Immunization History   Administered Date(s) Administered   • COVID-19 PFIZER VACCINE 0.3 ML IM 03/04/2021, 03/26/2021, 12/07/2021   • INFLUENZA 10/30/2008, 11/06/2021, 09/19/2022   • Influenza Injectable, MDCK, Preservative Free, Quadrivalent, 0.5 mL 09/16/2020   • Influenza, high dose seasonal 0.7 mL 09/19/2022, 09/20/2023   • Influenza, seasonal, injectable 10/31/2011, 11/06/2013, 10/01/2015   • Pneumococcal Conjugate 13-Valent 08/17/2015   • Pneumococcal Polysaccharide PPV23 01/01/2008   • Td (adult), adsorbed 03/01/1999   • Tdap 08/16/2017, 11/05/2020   • Zoster Vaccine Recombinant 02/23/2020, 06/22/2020      Health Maintenance:         Topic Date Due   • Colorectal Cancer Screening  Discontinued         Topic Date Due   • COVID-19 Vaccine (4 - Pfizer series) 02/01/2022      Medicare Screening Tests and Risk Assessments:     Cori Varghese is here for his Subsequent Wellness visit. Last Medicare Wellness visit information reviewed, patient interviewed and updates made to the record today. Health Risk Assessment:   Patient rates overall health as very good. Patient feels that their physical health rating is same. Patient is satisfied with their life. Eyesight was rated as same. Hearing was rated as same. Patient feels that their emotional and mental health rating is same. Patients states they are never, rarely angry. Patient states they are sometimes unusually tired/fatigued. Pain experienced in the last 7 days has been some. Patient's pain rating has been 2/10. Patient states that he has experienced no weight loss or gain in last 6 months. Depression Screening:   PHQ-2 Score: 1      Fall Risk Screening:    In the past year, patient has experienced: no history of falling in past year      Home Safety:  Patient does not have trouble with stairs inside or outside of their home. Patient has working smoke alarms and has no working carbon monoxide detector. Home safety hazards include: none. Nutrition:   Current diet is Regular. Medications:   Patient is currently taking over-the-counter supplements. OTC medications include: see medication list. Patient is able to manage medications. Activities of Daily Living (ADLs)/Instrumental Activities of Daily Living (IADLs):   Walk and transfer into and out of bed and chair?: Yes  Dress and groom yourself?: Yes    Bathe or shower yourself?: Yes    Feed yourself? Yes  Do your laundry/housekeeping?: Yes  Manage your money, pay your bills and track your expenses?: Yes  Make your own meals?: Yes    Do your own shopping?: Yes    Previous Hospitalizations:   Any hospitalizations or ED visits within the last 12 months?: No      Advance Care Planning:   Living will: Yes    Durable POA for healthcare: Yes    Advanced directive: Yes      PREVENTIVE SCREENINGS      Cardiovascular Screening:    General: Screening Not Indicated and History Lipid Disorder      Diabetes Screening:     General: Screening Current      Prostate Cancer Screening:    General: Screening Not Indicated      Lung Cancer Screening:     General: Screening Not Indicated    Screening, Brief Intervention, and Referral to Treatment (SBIRT)    Screening  Typical number of drinks in a day: 0  Typical number of drinks in a week: 0  Interpretation: Low risk drinking behavior.     Single Item Drug Screening:  How often have you used an illegal drug (including marijuana) or a prescription medication for non-medical reasons in the past year? never    Single Item Drug Screen Score: 0  Interpretation: Negative screen for possible drug use disorder    Other Counseling Topics:   Car/seat belt/driving safety, skin self-exam, sunscreen and regular weightbearing exercise and calcium and vitamin D intake. No results found. Physical Exam:     /70   Pulse 79   Temp 97.7 °F (36.5 °C) (Temporal)   Ht 5' 6" (1.676 m)   Wt 81.3 kg (179 lb 3.2 oz)   SpO2 97%   BMI 28.92 kg/m²     Physical Exam  Vitals and nursing note reviewed. Constitutional:       General: He is not in acute distress. Appearance: He is well-developed. HENT:      Head: Normocephalic and atraumatic. Nose: Nose normal.   Eyes:      Conjunctiva/sclera: Conjunctivae normal.   Cardiovascular:      Rate and Rhythm: Normal rate and regular rhythm. Heart sounds: No murmur heard. Pulmonary:      Effort: Pulmonary effort is normal. No respiratory distress. Breath sounds: Normal breath sounds. Abdominal:      Palpations: Abdomen is soft. Tenderness: There is no abdominal tenderness. Musculoskeletal:         General: No swelling. Cervical back: Neck supple. Skin:     General: Skin is warm and dry. Capillary Refill: Capillary refill takes less than 2 seconds. Neurological:      Mental Status: He is alert. Psychiatric:         Mood and Affect: Mood normal.      BMI Counseling: Body mass index is 28.92 kg/m². The BMI is above normal. Exercise recommendations include exercising 3-5 times per week.     Katelyn Mckenna MD

## 2023-11-07 DIAGNOSIS — I10 ESSENTIAL HYPERTENSION: ICD-10-CM

## 2023-11-07 RX ORDER — IRBESARTAN 150 MG/1
150 TABLET ORAL
Qty: 90 TABLET | Refills: 1 | Status: SHIPPED | OUTPATIENT
Start: 2023-11-07 | End: 2024-02-05

## 2023-12-14 ENCOUNTER — 6 MONTH FOLLOW UP (OUTPATIENT)
Dept: URBAN - METROPOLITAN AREA CLINIC 6 | Facility: CLINIC | Age: 83
End: 2023-12-14

## 2023-12-14 DIAGNOSIS — H57.811: ICD-10-CM

## 2023-12-14 DIAGNOSIS — H25.813: ICD-10-CM

## 2023-12-14 DIAGNOSIS — H35.3131: ICD-10-CM

## 2023-12-14 DIAGNOSIS — H02.832: ICD-10-CM

## 2023-12-14 DIAGNOSIS — H04.123: ICD-10-CM

## 2023-12-14 DIAGNOSIS — H35.371: ICD-10-CM

## 2023-12-14 DIAGNOSIS — H10.45: ICD-10-CM

## 2023-12-14 DIAGNOSIS — H02.835: ICD-10-CM

## 2023-12-14 PROCEDURE — 92014 COMPRE OPH EXAM EST PT 1/>: CPT

## 2023-12-14 PROCEDURE — 92134 CPTRZ OPH DX IMG PST SGM RTA: CPT

## 2023-12-14 ASSESSMENT — TONOMETRY
OD_IOP_MMHG: 13
OS_IOP_MMHG: 11

## 2023-12-14 ASSESSMENT — VISUAL ACUITY
OS_CC: 20/30+1
OD_CC: 20/30+2
OD_GLARE: 20/50
OS_GLARE: 20/50

## 2023-12-26 DIAGNOSIS — I10 ESSENTIAL HYPERTENSION: ICD-10-CM

## 2023-12-28 RX ORDER — AMLODIPINE BESYLATE 5 MG/1
5 TABLET ORAL DAILY
Qty: 90 TABLET | Refills: 3 | Status: SHIPPED | OUTPATIENT
Start: 2023-12-28

## 2024-01-01 NOTE — PROGRESS NOTES
Assessment/Plan:           1  Essential hypertension  -     irbesartan (AVAPRO) 150 mg tablet; Take 1 tablet (150 mg total) by mouth daily at bedtime  -     amLODIPine (NORVASC) 5 mg tablet; Take 1 tablet (5 mg total) by mouth daily    2  Chronic pain of both shoulders  Comments:  Most likely from calcific tendinitis  3  BPH associated with nocturia    4  Impaired fasting blood sugar    5  Impacted cerumen of left ear  Comments:  Debrox recommended  1  Essential hypertension    - irbesartan (AVAPRO) 150 mg tablet; Take 1 tablet (150 mg total) by mouth daily at bedtime  Dispense: 90 tablet; Refill: 3       No problem-specific Assessment & Plan notes found for this encounter  Subjective:      Patient ID: Gi Sharma is a 79 y o  male  HPI    The following portions of the patient's history were reviewed and updated as appropriate: He  has a past medical history of Arthritis, Basal cell carcinoma, Hypertension, Kidney stone, Skin cancer, and Squamous cell carcinoma of skin  He   Patient Active Problem List    Diagnosis Date Noted   • Subacromial impingement of right shoulder 04/25/2022   • Primary osteoarthritis of right shoulder 04/25/2022   • PVD [facility] ) 04/14/2021   • Lumbar stenosis with neurogenic claudication    • BPH associated with nocturia 12/14/2015   • White coat syndrome with diagnosis of hypertension 05/18/2012   • Mixed hyperlipidemia 05/18/2012     He  has a past surgical history that includes Kidney stone surgery (1982); Meniscectomy (2000); Tonsillectomy and adenoidectomy (1940); and pr ndsc wrst surg w/rls transvrs carpl ligm (Right, 1/19/2021)  His family history includes Arthritis in his family; Breast cancer in his mother; Cancer in his family; Heart disease in his father; Hypertension in his family  He  reports that he has never smoked   He has never used smokeless tobacco  He reports that he does not drink alcohol and does not use drugs   Current Outpatient Medications   Medication Sig Dispense Refill   • acetaminophen (TYLENOL) 650 mg CR tablet Take 1 tablet (650 mg total) by mouth every 8 (eight) hours as needed for mild pain 30 tablet 0   • amLODIPine (NORVASC) 5 mg tablet Take 1 tablet (5 mg total) by mouth daily 90 tablet 3   • Cholecalciferol (VITAMIN D3) 94044 units TABS Take 2,000 Units by mouth daily      • Coenzyme Q10 (COQ10) 100 MG CAPS Take 200 mg by mouth daily     • irbesartan (AVAPRO) 150 mg tablet Take 1 tablet (150 mg total) by mouth daily at bedtime 90 tablet 3   • Magnesium 400 MG CAPS Take by mouth 400MG     • multivitamin (THERAGRAN) TABS Take 1 tablet by mouth daily     • NON FORMULARY RELIEF FACTOR - ICARIIN 400MG , OMEGA 2800MG, CURCUMIN 1334MG, RESERVATROL 140 MG     • carbamide peroxide (DEBROX) 6 5 % otic solution Administer 5 drops into the left ear daily at bedtime (Patient not taking: Reported on 12/19/2022) 15 mL 0   • Omega-3 Fatty Acids (OMEGA-3 CF) 1000 MG CAPS Take 1 capsule by mouth daily (Patient not taking: Reported on 12/19/2022)       No current facility-administered medications for this visit       Current Outpatient Medications on File Prior to Visit   Medication Sig   • acetaminophen (TYLENOL) 650 mg CR tablet Take 1 tablet (650 mg total) by mouth every 8 (eight) hours as needed for mild pain   • Cholecalciferol (VITAMIN D3) 64935 units TABS Take 2,000 Units by mouth daily    • Coenzyme Q10 (COQ10) 100 MG CAPS Take 200 mg by mouth daily   • Magnesium 400 MG CAPS Take by mouth 400MG   • multivitamin (THERAGRAN) TABS Take 1 tablet by mouth daily   • NON FORMULARY RELIEF FACTOR - ICARIIN 400MG , OMEGA 2800MG, CURCUMIN 1334MG, RESERVATROL 140 MG   • [DISCONTINUED] amLODIPine (NORVASC) 5 mg tablet TAKE 1 TABLET BY MOUTH  DAILY   • [DISCONTINUED] irbesartan (AVAPRO) 150 mg tablet Take 1 tablet (150 mg total) by mouth daily at bedtime   • carbamide peroxide (DEBROX) 6 5 % otic solution Administer 5 drops into the left ear daily at bedtime (Patient not taking: Reported on 12/19/2022)   • Omega-3 Fatty Acids (OMEGA-3 CF) 1000 MG CAPS Take 1 capsule by mouth daily (Patient not taking: Reported on 12/19/2022)     No current facility-administered medications on file prior to visit  He is allergic to sulfate       Review of Systems   Constitutional: Negative for appetite change, chills, fatigue and fever  HENT: Negative for sore throat and trouble swallowing  Eyes: Negative for redness  Respiratory: Negative for shortness of breath  Cardiovascular: Negative for chest pain and palpitations  Gastrointestinal: Negative for abdominal pain, constipation and diarrhea  Genitourinary: Negative for dysuria and hematuria  Musculoskeletal: Negative for back pain and neck pain  Skin: Negative for rash  Neurological: Negative for seizures, weakness and headaches  Hematological: Negative for adenopathy  Psychiatric/Behavioral: Negative for confusion  The patient is not nervous/anxious            Objective:      BP (P) 120/66 (BP Location: Left arm, Patient Position: Sitting, Cuff Size: Adult)   Pulse (P) 90   Temp (P) 98 4 °F (36 9 °C) (Temporal)   Ht 5' 6" (1 676 m)   Wt 79 4 kg (175 lb)   SpO2 (P) 98%   BMI 28 25 kg/m²     Results Reviewed     None          Recent Results (from the past 1344 hour(s))   Lipid panel    Collection Time: 12/12/22  7:46 AM   Result Value Ref Range    Total Cholesterol 161 <200 mg/dL    HDL 50 > OR = 40 mg/dL    Triglycerides 119 <150 mg/dL    LDL Calculated 89 mg/dL (calc)    Chol HDLC Ratio 3 2 <5 0 (calc)    Non-HDL Cholesterol 111 <130 mg/dL (calc)   Comprehensive metabolic panel    Collection Time: 12/12/22  7:46 AM   Result Value Ref Range    Glucose, Random 115 (H) 65 - 99 mg/dL    BUN 25 7 - 25 mg/dL    Creatinine 1 00 0 70 - 1 22 mg/dL    eGFR 75 > OR = 60 mL/min/1 73m2    SL AMB BUN/CREATININE RATIO NOT APPLICABLE 6 - 22 (calc)    Sodium 136 135 - 146 mmol/L Potassium 4 4 3 5 - 5 3 mmol/L    Chloride 101 98 - 110 mmol/L    CO2 29 20 - 32 mmol/L    Calcium 9 3 8 6 - 10 3 mg/dL    Protein, Total 6 8 6 1 - 8 1 g/dL    Albumin 4 2 3 6 - 5 1 g/dL    Globulin 2 6 1 9 - 3 7 g/dL (calc)    Albumin/Globulin Ratio 1 6 1 0 - 2 5 (calc)    TOTAL BILIRUBIN 0 7 0 2 - 1 2 mg/dL    Alkaline Phosphatase 72 35 - 144 U/L    AST 19 10 - 35 U/L    ALT 17 9 - 46 U/L   Urinalysis with microscopic    Collection Time: 12/12/22  7:46 AM   Result Value Ref Range    Color UA YELLOW YELLOW    Urine Appearance CLEAR CLEAR    Specific Gravity 1 015 1 001 - 1 035    Ph 7 0 5 0 - 8 0    Glucose, Urine NEGATIVE NEGATIVE    Bilirubin, Urine NEGATIVE NEGATIVE    Ketone, Urine NEGATIVE NEGATIVE    Blood, Urine NEGATIVE NEGATIVE    Protein, Urine NEGATIVE NEGATIVE    SL AMB NITRITES URINE, QUAL   NEGATIVE NEGATIVE    Leukocyte Esterase NEGATIVE NEGATIVE    SL AMB WBC, URINE NONE SEEN < OR = 5 /HPF    RBC, Urine NONE SEEN < OR = 2 /HPF    Squamous Epithelial Cells NONE SEEN < OR = 5 /HPF    Bacteria, UA NONE SEEN NONE SEEN /HPF    Hyaline Casts NONE SEEN NONE SEEN /LPF    Comment(s)     CBC and differential    Collection Time: 12/12/22  7:46 AM   Result Value Ref Range    White Blood Cell Count 7 1 3 8 - 10 8 Thousand/uL    Red Blood Cell Count 4 93 4 20 - 5 80 Million/uL    Hemoglobin 15 1 13 2 - 17 1 g/dL    HCT 44 0 38 5 - 50 0 %    MCV 89 2 80 0 - 100 0 fL    MCH 30 6 27 0 - 33 0 pg    MCHC 34 3 32 0 - 36 0 g/dL    RDW 12 5 11 0 - 15 0 %    Platelet Count 044 772 - 400 Thousand/uL    SL AMB MPV 11 0 7 5 - 12 5 fL    Neutrophils (Absolute) 3,742 1,500 - 7,800 cells/uL    Lymphocytes (Absolute) 2,116 850 - 3,900 cells/uL    Monocytes (Absolute) 781 200 - 950 cells/uL    Eosinophils (Absolute) 369 15 - 500 cells/uL    Basophils ABS 92 0 - 200 cells/uL    Neutrophils 52 7 %    Lymphocytes 29 8 %    Monocytes 11 0 %    Eosinophils 5 2 %    Basophils PCT 1 3 %   Hemoglobin A1c (w/out EAG)    Collection Time: 12/12/22  7:46 AM   Result Value Ref Range    Hemoglobin A1C 5 7 (H) <5 7 % of total Hgb        Physical Exam  Constitutional:       General: He is not in acute distress  Appearance: Normal appearance  HENT:      Head: Normocephalic and atraumatic  Nose: Nose normal       Mouth/Throat:      Mouth: Mucous membranes are moist    Eyes:      Extraocular Movements: Extraocular movements intact  Pupils: Pupils are equal, round, and reactive to light  Cardiovascular:      Rate and Rhythm: Normal rate and regular rhythm  Pulses: Normal pulses  Heart sounds: Normal heart sounds  No murmur heard  No friction rub  Pulmonary:      Effort: Pulmonary effort is normal  No respiratory distress  Breath sounds: Normal breath sounds  No wheezing  Abdominal:      General: Abdomen is flat  Bowel sounds are normal  There is no distension  Palpations: Abdomen is soft  There is no mass  Tenderness: There is no abdominal tenderness  There is no guarding  Musculoskeletal:         General: Normal range of motion  Neurological:      General: No focal deficit present  Mental Status: He is alert and oriented to person, place, and time  Mental status is at baseline  Cranial Nerves: No cranial nerve deficit     Psychiatric:         Mood and Affect: Mood normal          Behavior: Behavior normal  Lactation Consult

## 2024-01-05 LAB
ALBUMIN SERPL-MCNC: 4.2 G/DL (ref 3.6–5.1)
ALBUMIN/GLOB SERPL: 1.4 (CALC) (ref 1–2.5)
ALP SERPL-CCNC: 61 U/L (ref 35–144)
ALT SERPL-CCNC: 22 U/L (ref 9–46)
AST SERPL-CCNC: 22 U/L (ref 10–35)
BASOPHILS # BLD AUTO: 56 CELLS/UL (ref 0–200)
BASOPHILS NFR BLD AUTO: 0.7 %
BILIRUB SERPL-MCNC: 0.9 MG/DL (ref 0.2–1.2)
BUN SERPL-MCNC: 23 MG/DL (ref 7–25)
BUN/CREAT SERPL: ABNORMAL (CALC) (ref 6–22)
CALCIUM SERPL-MCNC: 9.3 MG/DL (ref 8.6–10.3)
CHLORIDE SERPL-SCNC: 100 MMOL/L (ref 98–110)
CHOLEST SERPL-MCNC: 185 MG/DL
CHOLEST/HDLC SERPL: 3.2 (CALC)
CO2 SERPL-SCNC: 29 MMOL/L (ref 20–32)
CREAT SERPL-MCNC: 1.04 MG/DL (ref 0.7–1.22)
EOSINOPHIL # BLD AUTO: 288 CELLS/UL (ref 15–500)
EOSINOPHIL NFR BLD AUTO: 3.6 %
ERYTHROCYTE [DISTWIDTH] IN BLOOD BY AUTOMATED COUNT: 12.9 % (ref 11–15)
GFR/BSA.PRED SERPLBLD CYS-BASED-ARV: 71 ML/MIN/1.73M2
GLOBULIN SER CALC-MCNC: 2.9 G/DL (CALC) (ref 1.9–3.7)
GLUCOSE SERPL-MCNC: 107 MG/DL (ref 65–99)
HBA1C MFR BLD: 5.7 % OF TOTAL HGB
HCT VFR BLD AUTO: 45 % (ref 38.5–50)
HDLC SERPL-MCNC: 58 MG/DL
HGB BLD-MCNC: 15.2 G/DL (ref 13.2–17.1)
LDLC SERPL CALC-MCNC: 108 MG/DL (CALC)
LYMPHOCYTES # BLD AUTO: 2312 CELLS/UL (ref 850–3900)
LYMPHOCYTES NFR BLD AUTO: 28.9 %
MCH RBC QN AUTO: 30.2 PG (ref 27–33)
MCHC RBC AUTO-ENTMCNC: 33.8 G/DL (ref 32–36)
MCV RBC AUTO: 89.3 FL (ref 80–100)
MONOCYTES # BLD AUTO: 944 CELLS/UL (ref 200–950)
MONOCYTES NFR BLD AUTO: 11.8 %
NEUTROPHILS # BLD AUTO: 4400 CELLS/UL (ref 1500–7800)
NEUTROPHILS NFR BLD AUTO: 55 %
NONHDLC SERPL-MCNC: 127 MG/DL (CALC)
PLATELET # BLD AUTO: 245 THOUSAND/UL (ref 140–400)
PMV BLD REES-ECKER: 11.1 FL (ref 7.5–12.5)
POTASSIUM SERPL-SCNC: 4 MMOL/L (ref 3.5–5.3)
PROT SERPL-MCNC: 7.1 G/DL (ref 6.1–8.1)
RBC # BLD AUTO: 5.04 MILLION/UL (ref 4.2–5.8)
SODIUM SERPL-SCNC: 135 MMOL/L (ref 135–146)
TRIGL SERPL-MCNC: 98 MG/DL
TSH SERPL-ACNC: 3.5 MIU/L (ref 0.4–4.5)
WBC # BLD AUTO: 8 THOUSAND/UL (ref 3.8–10.8)

## 2024-01-15 ENCOUNTER — OFFICE VISIT (OUTPATIENT)
Dept: OBGYN CLINIC | Facility: OTHER | Age: 84
End: 2024-01-15
Payer: MEDICARE

## 2024-01-15 VITALS
DIASTOLIC BLOOD PRESSURE: 67 MMHG | HEIGHT: 66 IN | WEIGHT: 183 LBS | HEART RATE: 84 BPM | BODY MASS INDEX: 29.41 KG/M2 | SYSTOLIC BLOOD PRESSURE: 135 MMHG

## 2024-01-15 DIAGNOSIS — M75.42 IMPINGEMENT SYNDROME OF LEFT SHOULDER: Primary | ICD-10-CM

## 2024-01-15 DIAGNOSIS — M19.012 PRIMARY OSTEOARTHRITIS OF LEFT SHOULDER: ICD-10-CM

## 2024-01-15 PROCEDURE — 20610 DRAIN/INJ JOINT/BURSA W/O US: CPT | Performed by: ORTHOPAEDIC SURGERY

## 2024-01-15 PROCEDURE — 99213 OFFICE O/P EST LOW 20 MIN: CPT | Performed by: ORTHOPAEDIC SURGERY

## 2024-01-15 RX ORDER — BETAMETHASONE SODIUM PHOSPHATE AND BETAMETHASONE ACETATE 3; 3 MG/ML; MG/ML
6 INJECTION, SUSPENSION INTRA-ARTICULAR; INTRALESIONAL; INTRAMUSCULAR; SOFT TISSUE
Status: COMPLETED | OUTPATIENT
Start: 2024-01-15 | End: 2024-01-15

## 2024-01-15 RX ORDER — BUPIVACAINE HYDROCHLORIDE 2.5 MG/ML
2 INJECTION, SOLUTION INFILTRATION; PERINEURAL
Status: COMPLETED | OUTPATIENT
Start: 2024-01-15 | End: 2024-01-15

## 2024-01-15 RX ADMIN — BETAMETHASONE SODIUM PHOSPHATE AND BETAMETHASONE ACETATE 6 MG: 3; 3 INJECTION, SUSPENSION INTRA-ARTICULAR; INTRALESIONAL; INTRAMUSCULAR; SOFT TISSUE at 09:15

## 2024-01-15 RX ADMIN — BUPIVACAINE HYDROCHLORIDE 2 ML: 2.5 INJECTION, SOLUTION INFILTRATION; PERINEURAL at 09:15

## 2024-01-15 NOTE — PATIENT INSTRUCTIONS
CORTICOSTEROID INJECTION  What is a corticosteroid?  Injuries or disease such as arthritis, bursitis or tendonitis result in inflammation.  In turn, this inflammation can cause swelling and pain.  A local injection of a corticosteroid is provided to diminish inflammation.  By doing so, it will also decrease pain and swelling which is making you uncomfortable.     Is this the same thing as a Cortisone Injection?  Cortisone® is a brand name of a corticosteroid used commonly in the past.  Today I commonly use a more water-soluble corticosteroid named Celestone®.    Will the injection hurt?  As with any injection, you may feel pain at the time of the injection. Typically, I use a local anesthetic (Marcaine®) in addition to the corticosteroid to determine if the injection has been placed in the appropriate location.  Hence it is important to monitor your symptoms 4-6 hours after the injection, as the area will be anesthetized (numb) while the local anesthetic is working.  Once the local anesthetic wears off, the intensity of pain can be the same as it was prior to the injection, or even worse.  This does not mean that the injection is not working.  The corticosteroid may take 24-72 hours to begin having a positive effect.    If you do experience an increase in pain, the use of an ice pack on the area for 20 minutes at a time should help.  It is also helpful to take an oral anti-inflammatory such as Tylenol® or Motrin® if you are able to medically do so.  For this reason it is best to avoid activities that put stress on the area the first 24 hours after the injection.    How long will pain relief last?  This will vary according to the type and severity of the symptoms being treated and the severity of the condition.  Symptom relief may last weeks to months.  I typically couple injections with physical therapy so that the underlying problem causing the inflammation may be treated as the pain diminishes.  If the combination  is not successful, you may be a surgical candidate.    I have read bad things about steroids.  Will these things happen to me?  Corticosteroids, when utilized properly, are safe and effective drugs.  When used in a low dose, potential adverse reactions are very rare.  Some patients may experience a sensation of flushing for several days.  Very rarely, there can be a local reaction which may include increased discomfort for a period of time in the areas that has been injected.  A steroid should not be used over and over again.  Multiple injections in the same area can produce adverse effects such as tissue atrophy and degeneration of tendon or cartilage.  A small percentage of patients (< 0.1%) may develop an infection in the joint after injection.  This is a treatable problem, but if neglected, may result in permanent disability.  Signs of infection include redness, swelling, discharge, fevers, increasing pain and drainage from the injection site.  This represents an emergency and you should contact our office immediately or seek treatment in the ER if after hours.    If I have diabetes, will this injection affect me?  If you are diabetic, an injection of a corticosteroid can raise your blood sugar level, requiring more insulin for a brief period of time.  This may necessitate careful blood sugar maintenance.  If the elevated sugars are not able to be controlled, contact your diabetic doctor for guidance.

## 2024-01-15 NOTE — PROGRESS NOTES
"  Assessment  Diagnoses and all orders for this visit:    Impingement syndrome of left shoulder    Primary osteoarthritis of left shoulder    Discussion and Plan:    Explained to the patient that his examination and symptoms continue to be consistent with subacromial impingement syndrome of the left shoulder. As he experienced significant benefit from previous cortisone injection, one was repeated today in the office. He will also again begin formal PT/HEP for proper exercises. He was offered to obtain a new MRI of his left shoulder but declined.   Follow up on an as needed basis    Subjective:   Patient ID: Rayray Dunn is a 83 y.o. male presents today for a follow up visit for his left shoulder. Patient was seen on 1/10/23 and provided with a cortisone injection for subacromial impingement syndrome with significant benefit for many months. Today, he reports that he feels similar symptoms returning over the past few months without known acute injury or trauma. Pain is localized about the anterolateral aspect of the shoulder, worse with overhead motions. No numbness or tingling. No fevers or chills.           The following portions of the patient's history were reviewed and updated as appropriate: allergies, current medications, past family history, past medical history, past social history, past surgical history and problem list.    Objective:  /67 (BP Location: Left arm, Patient Position: Sitting, Cuff Size: Standard)   Pulse 84   Ht 5' 6\" (1.676 m)   Wt 83 kg (183 lb)   BMI 29.54 kg/m²       Left Shoulder Exam     Range of Motion   The patient has normal left shoulder ROM.    Muscle Strength   Abduction: 4/5   External rotation: 5/5     Tests   Covarrubias test: positive  Impingement: positive    Other   Erythema: absent  Sensation: normal  Pulse: present             Physical Exam  Constitutional:       General: He is not in acute distress.     Appearance: He is well-developed.   Eyes:      " "Conjunctiva/sclera: Conjunctivae normal.      Pupils: Pupils are equal, round, and reactive to light.   Cardiovascular:      Rate and Rhythm: Normal rate and regular rhythm.   Pulmonary:      Effort: Pulmonary effort is normal.      Breath sounds: Normal breath sounds.   Abdominal:      General: Bowel sounds are normal.      Palpations: Abdomen is soft.   Musculoskeletal:      Cervical back: Normal range of motion and neck supple.   Skin:     General: Skin is warm and dry.      Findings: No erythema or rash.   Neurological:      Mental Status: He is alert and oriented to person, place, and time.      Deep Tendon Reflexes: Reflexes are normal and symmetric.   Psychiatric:         Behavior: Behavior normal.         Large joint arthrocentesis: L subacromial bursa  Universal Protocol:  Consent: Verbal consent obtained.  Risks and benefits: risks, benefits and alternatives were discussed  Consent given by: patient  Time out: Immediately prior to procedure a \"time out\" was called to verify the correct patient, procedure, equipment, support staff and site/side marked as required.  Site marked: the operative site was marked  Radiology Images displayed and confirmed. If images not available, report reviewed: imaging studies available  Patient identity confirmed: verbally with patient  Supporting Documentation  Indications: pain and diagnostic evaluation   Procedure Details  Location: shoulder - L subacromial bursa  Preparation: Patient was prepped and draped in the usual sterile fashion  Needle size: 22 G  Ultrasound guidance: no  Approach: lateral  Medications administered: 2 mL bupivacaine 0.25 %; 6 mg betamethasone acetate-betamethasone sodium phosphate 6 (3-3) mg/mL    Patient tolerance: patient tolerated the procedure well with no immediate complications  Dressing:  Sterile dressing applied        Scribe Attestation      I,:  Mark Salcido am acting as a scribe while in the presence of the attending physician.:     "   I,:  Felix Galindo MD personally performed the services described in this documentation    as scribed in my presence.:

## 2024-01-17 ENCOUNTER — OFFICE VISIT (OUTPATIENT)
Dept: INTERNAL MEDICINE CLINIC | Facility: CLINIC | Age: 84
End: 2024-01-17
Payer: MEDICARE

## 2024-01-17 VITALS
OXYGEN SATURATION: 99 % | SYSTOLIC BLOOD PRESSURE: 142 MMHG | DIASTOLIC BLOOD PRESSURE: 70 MMHG | HEART RATE: 77 BPM | WEIGHT: 183 LBS | TEMPERATURE: 97.8 F | BODY MASS INDEX: 29.41 KG/M2 | HEIGHT: 66 IN

## 2024-01-17 DIAGNOSIS — I10 ESSENTIAL HYPERTENSION: Primary | ICD-10-CM

## 2024-01-17 DIAGNOSIS — M54.12 CERVICAL RADICULOPATHY: ICD-10-CM

## 2024-01-17 DIAGNOSIS — E55.9 VITAMIN D DEFICIENCY, UNSPECIFIED: ICD-10-CM

## 2024-01-17 DIAGNOSIS — M54.50 CHRONIC BILATERAL LOW BACK PAIN WITHOUT SCIATICA: ICD-10-CM

## 2024-01-17 DIAGNOSIS — G89.29 CHRONIC BILATERAL LOW BACK PAIN WITHOUT SCIATICA: ICD-10-CM

## 2024-01-17 PROCEDURE — 99214 OFFICE O/P EST MOD 30 MIN: CPT | Performed by: INTERNAL MEDICINE

## 2024-01-17 RX ORDER — IRBESARTAN 150 MG/1
225 TABLET ORAL
Qty: 135 TABLET | Refills: 1 | Status: SHIPPED | OUTPATIENT
Start: 2024-01-17 | End: 2024-04-16

## 2024-01-17 NOTE — PROGRESS NOTES
Assessment/Plan:           1. Essential hypertension  Comments:  Irbesartan will be increased to 1-1/2 tablets daily.  Continue amlodipine 5 mg daily and check BP at home.  Orders:  -     irbesartan (AVAPRO) 150 mg tablet; Take 1.5 tablets (225 mg total) by mouth daily at bedtime  -     Basic metabolic panel; Future; Expected date: 04/01/2024    2. Chronic bilateral low back pain without sciatica  Comments:  Currently stable continue Tylenol as needed and therapy.    3. Cervical radiculopathy  Comments:  This is stable continue current management.    4. Vitamin D deficiency, unspecified           1. Essential hypertension         No problem-specific Assessment & Plan notes found for this encounter.           Subjective:      Patient ID: Rayray Dunn is a 83 y.o. male.    HPI    The following portions of the patient's history were reviewed and updated as appropriate: He  has a past medical history of Arthritis, Basal cell carcinoma, Hypertension, Kidney stone, Skin cancer, and Squamous cell carcinoma of skin.  He   Patient Active Problem List    Diagnosis Date Noted    Impingement syndrome of left shoulder 01/15/2024    Primary osteoarthritis of left shoulder 01/10/2023    Impingement syndrome of right shoulder 04/25/2022    Primary osteoarthritis of right shoulder 04/25/2022    PVD (peripheral vascular disease) ([facility]) 04/14/2021    Lumbar stenosis with neurogenic claudication     BPH associated with nocturia 12/14/2015    White coat syndrome with diagnosis of hypertension 05/18/2012    Mixed hyperlipidemia 05/18/2012     He  has a past surgical history that includes Kidney stone surgery (1982); Meniscectomy (2000); Tonsillectomy and adenoidectomy (1940); and pr ndsc wrst surg w/rls transvrs carpl ligm (Right, 1/19/2021).  His family history includes Arthritis in his family; Breast cancer in his mother; Cancer in his family; Heart disease in his father; Hypertension in his family.  He  reports that he has never  smoked. He has never used smokeless tobacco. He reports that he does not drink alcohol and does not use drugs.  Current Outpatient Medications   Medication Sig Dispense Refill    acetaminophen (TYLENOL) 650 mg CR tablet Take 1 tablet (650 mg total) by mouth every 8 (eight) hours as needed for mild pain 30 tablet 0    amLODIPine (NORVASC) 5 mg tablet Take 1 tablet (5 mg total) by mouth daily 90 tablet 3    carbamide peroxide (DEBROX) 6.5 % otic solution Administer 5 drops into the left ear daily at bedtime 15 mL 0    Cholecalciferol (VITAMIN D3) 92655 units TABS Take 2,000 Units by mouth daily       Coenzyme Q10 (COQ10) 100 MG CAPS Take 200 mg by mouth daily      irbesartan (AVAPRO) 150 mg tablet Take 1.5 tablets (225 mg total) by mouth daily at bedtime 135 tablet 1    multivitamin (THERAGRAN) TABS Take 1 tablet by mouth daily      NON FORMULARY RELIEF FACTOR - ICARIIN 400MG , OMEGA 2800MG, CURCUMIN 1334MG, RESERVATROL 140 MG      Omega-3 Fatty Acids (OMEGA-3 CF) 1000 MG CAPS Take 1 capsule by mouth daily      sodium chloride (OCEAN) 0.65 % nasal spray 1 spray into each nostril every 3 (three) hours 60 mL 1    triamcinolone (KENALOG) 0.1 % cream       Magnesium 400 MG CAPS Take by mouth 400MG (Patient not taking: Reported on 1/17/2024)       No current facility-administered medications for this visit.     Current Outpatient Medications on File Prior to Visit   Medication Sig    acetaminophen (TYLENOL) 650 mg CR tablet Take 1 tablet (650 mg total) by mouth every 8 (eight) hours as needed for mild pain    amLODIPine (NORVASC) 5 mg tablet Take 1 tablet (5 mg total) by mouth daily    carbamide peroxide (DEBROX) 6.5 % otic solution Administer 5 drops into the left ear daily at bedtime    Cholecalciferol (VITAMIN D3) 73583 units TABS Take 2,000 Units by mouth daily     Coenzyme Q10 (COQ10) 100 MG CAPS Take 200 mg by mouth daily    multivitamin (THERAGRAN) TABS Take 1 tablet by mouth daily    NON FORMULARY RELIEF FACTOR -  "ICARIIN 400MG , OMEGA 2800MG, CURCUMIN 1334MG, RESERVATROL 140 MG    Omega-3 Fatty Acids (OMEGA-3 CF) 1000 MG CAPS Take 1 capsule by mouth daily    sodium chloride (OCEAN) 0.65 % nasal spray 1 spray into each nostril every 3 (three) hours    triamcinolone (KENALOG) 0.1 % cream     [DISCONTINUED] irbesartan (AVAPRO) 150 mg tablet Take 1 tablet (150 mg total) by mouth daily at bedtime    Magnesium 400 MG CAPS Take by mouth 400MG (Patient not taking: Reported on 1/17/2024)     No current facility-administered medications on file prior to visit.     Medications Discontinued During This Encounter   Medication Reason    irbesartan (AVAPRO) 150 mg tablet Reorder      He is allergic to sulfate..    Review of Systems   Constitutional:  Negative for appetite change, chills, fatigue and fever.   HENT:  Negative for sore throat and trouble swallowing.    Eyes:  Negative for redness.   Respiratory:  Negative for shortness of breath.    Cardiovascular:  Negative for chest pain and palpitations.   Gastrointestinal:  Negative for abdominal pain, constipation and diarrhea.   Genitourinary:  Negative for dysuria and hematuria.   Musculoskeletal:  Negative for back pain and neck pain.   Skin:  Negative for rash.   Neurological:  Negative for seizures, weakness and headaches.   Hematological:  Negative for adenopathy.   Psychiatric/Behavioral:  Negative for confusion. The patient is not nervous/anxious.          Objective:      /70 (BP Location: Left arm, Patient Position: Sitting, Cuff Size: Standard)   Pulse 77   Temp 97.8 °F (36.6 °C) (Temporal)   Ht 5' 6\" (1.676 m)   Wt 83 kg (183 lb)   SpO2 99%   BMI 29.54 kg/m²     Results Reviewed       None            Recent Results (from the past 1344 hour(s))   Lipid panel    Collection Time: 01/05/24  8:19 AM   Result Value Ref Range    Total Cholesterol 185 <200 mg/dL    HDL 58 > OR = 40 mg/dL    Triglycerides 98 <150 mg/dL    LDL Calculated 108 (H) mg/dL (calc)    Chol HDLC " Ratio 3.2 <5.0 (calc)    Non-HDL Cholesterol 127 <130 mg/dL (calc)   Comprehensive metabolic panel    Collection Time: 01/05/24  8:19 AM   Result Value Ref Range    Glucose, Random 107 (H) 65 - 99 mg/dL    BUN 23 7 - 25 mg/dL    Creatinine 1.04 0.70 - 1.22 mg/dL    eGFR 71 > OR = 60 mL/min/1.73m2    SL AMB BUN/CREATININE RATIO SEE NOTE: 6 - 22 (calc)    Sodium 135 135 - 146 mmol/L    Potassium 4.0 3.5 - 5.3 mmol/L    Chloride 100 98 - 110 mmol/L    CO2 29 20 - 32 mmol/L    Calcium 9.3 8.6 - 10.3 mg/dL    Protein, Total 7.1 6.1 - 8.1 g/dL    Albumin 4.2 3.6 - 5.1 g/dL    Globulin 2.9 1.9 - 3.7 g/dL (calc)    Albumin/Globulin Ratio 1.4 1.0 - 2.5 (calc)    TOTAL BILIRUBIN 0.9 0.2 - 1.2 mg/dL    Alkaline Phosphatase 61 35 - 144 U/L    AST 22 10 - 35 U/L    ALT 22 9 - 46 U/L   CBC and differential    Collection Time: 01/05/24  8:19 AM   Result Value Ref Range    White Blood Cell Count 8.0 3.8 - 10.8 Thousand/uL    Red Blood Cell Count 5.04 4.20 - 5.80 Million/uL    Hemoglobin 15.2 13.2 - 17.1 g/dL    HCT 45.0 38.5 - 50.0 %    MCV 89.3 80.0 - 100.0 fL    MCH 30.2 27.0 - 33.0 pg    MCHC 33.8 32.0 - 36.0 g/dL    RDW 12.9 11.0 - 15.0 %    Platelet Count 245 140 - 400 Thousand/uL    SL AMB MPV 11.1 7.5 - 12.5 fL    Neutrophils (Absolute) 4,400 1,500 - 7,800 cells/uL    Lymphocytes (Absolute) 2,312 850 - 3,900 cells/uL    Monocytes (Absolute) 944 200 - 950 cells/uL    Eosinophils (Absolute) 288 15 - 500 cells/uL    Basophils ABS 56 0 - 200 cells/uL    Neutrophils 55 %    Lymphocytes 28.9 %    Monocytes 11.8 %    Eosinophils 3.6 %    Basophils PCT 0.7 %   TSH, 3rd generation    Collection Time: 01/05/24  8:19 AM   Result Value Ref Range    TSH 3.50 0.40 - 4.50 mIU/L   Hemoglobin A1c (w/out EAG)    Collection Time: 01/05/24  8:19 AM   Result Value Ref Range    Hemoglobin A1C 5.7 (H) <5.7 % of total Hgb        Physical Exam  Constitutional:       General: He is not in acute distress.     Appearance: Normal appearance.   HENT:       Head: Normocephalic and atraumatic.      Nose: Nose normal.      Mouth/Throat:      Mouth: Mucous membranes are moist.   Eyes:      Extraocular Movements: Extraocular movements intact.      Pupils: Pupils are equal, round, and reactive to light.   Cardiovascular:      Rate and Rhythm: Normal rate and regular rhythm.      Pulses: Normal pulses.      Heart sounds: Normal heart sounds. No murmur heard.     No friction rub.   Pulmonary:      Effort: Pulmonary effort is normal. No respiratory distress.      Breath sounds: Normal breath sounds. No wheezing.   Abdominal:      General: Abdomen is flat. Bowel sounds are normal. There is no distension.      Palpations: Abdomen is soft. There is no mass.      Tenderness: There is no abdominal tenderness. There is no guarding.   Musculoskeletal:         General: Normal range of motion.      Cervical back: Normal range of motion.   Neurological:      General: No focal deficit present.      Mental Status: He is alert and oriented to person, place, and time. Mental status is at baseline.      Cranial Nerves: No cranial nerve deficit.      Gait: Gait abnormal.   Psychiatric:         Mood and Affect: Mood normal.         Behavior: Behavior normal.

## 2024-01-23 ENCOUNTER — EVALUATION (OUTPATIENT)
Dept: PHYSICAL THERAPY | Age: 84
End: 2024-01-23
Payer: MEDICARE

## 2024-01-23 DIAGNOSIS — M75.42 IMPINGEMENT SYNDROME OF LEFT SHOULDER: Primary | ICD-10-CM

## 2024-01-23 DIAGNOSIS — M19.012 PRIMARY OSTEOARTHRITIS OF LEFT SHOULDER: ICD-10-CM

## 2024-01-23 PROCEDURE — 97110 THERAPEUTIC EXERCISES: CPT | Performed by: PHYSICAL THERAPIST

## 2024-01-23 PROCEDURE — 97161 PT EVAL LOW COMPLEX 20 MIN: CPT | Performed by: PHYSICAL THERAPIST

## 2024-01-23 NOTE — PROGRESS NOTES
PT Evaluation     Today's date: 2024  Patient name: Rayray Dunn  : 1940  MRN: [id number]  Referring provider: Felix Galindo*  Dx:   Encounter Diagnosis     ICD-10-CM    1. Impingement syndrome of left shoulder  M75.42 Ambulatory Referral to Physical Therapy      2. Primary osteoarthritis of left shoulder  M19.012 Ambulatory Referral to Physical Therapy                     Assessment  Assessment details: Patient seen for PT evaluation for L shoulder impingement. Patient presents with impingement and weakness at rotator cuff, scapular dyskinesia L>R, no formal HEP for shoulder pain, and slight decrease in L shoulder ROM.  Patient interested in HEP for home. PT instructed patient in exercises, performed and practiced exercises in PT.   Patient is an excellent candidate for PT. Recommending patient to trial his HEP and call/email PT if he has further questions or concerns and to follow up with PT PRN.   Impairments: abnormal or restricted ROM, impaired physical strength, lacks appropriate home exercise program, pain with function and poor body mechanics  Functional limitations: L shoulder pain with abduction type movements- dressing, buckling the seat belt, with IADLs, with lifting and playing his keyboard (piano).   Prognosis: good    Goals  Impairment Goals to be met within 4 weeks.  - Decrease pain to 0/10 with HEP  - Increase strength to 4/5 throughout L UE  - Patient to be I with HEP and progressions     Functional Goals to be met within 4-6 weeks.   - Return to Prior Level of Function  - Increase Functional Status Measure to: expected  - Patient will be independent with HEP  - Patient to be able to tolerate lifting his keyboard with less shoulder pain.       Plan  Patient would benefit from: skilled physical therapy  Planned modality interventions: cryotherapy and thermotherapy: hydrocollator packs  Planned therapy interventions: abdominal trunk stabilization, IASTM, joint  mobilization, kinesiology taping, manual therapy, neuromuscular re-education, patient education, postural training, strengthening, stretching, therapeutic activities, therapeutic exercise, home exercise program and body mechanics training  Frequency: 1x week  Duration in weeks: 6  Treatment plan discussed with: patient        Subjective Evaluation    History of Present Illness  Mechanism of injury: Patient with long history of L shoulder pain, had 2 cortisone injections (1 each year), most recent x 1 week ago. Patient does have relief of the shoulder pain, has no pain in the shoulder at rest and a pulling sensation with abduction movements, and with sleeping.     Patient's goal is to learn HEP to be able to continue to play his keyboard.     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  Patient Goals  Patient goals for therapy: decreased pain, increased motion, increased strength and return to sport/leisure activities    Pain  Current pain ratin  At best pain ratin  At worst pain rating: 3  Location: L shoulder- intermittent  Quality: needle-like, sharp, burning and discomfort  Aggravating factors: lifting, overhead activity and keyboarding    Social Support    Employment status: not working    Diagnostic Tests  X-ray: abnormal (OA)  Treatments  Previous treatment: injection treatment        Objective     Active Range of Motion   Left Shoulder   Flexion: 165 degrees with pain  Abduction: 155 degrees with pain  External rotation BTH: C6   Internal rotation BTB: T10 with pain    Right Shoulder   Normal active range of motion  Flexion: 175 degrees   Abduction: 175 degrees     Scapular Mobility   Left Shoulder   Scapular Dyskinesis: grade I  Scapular mobility: fair  Scapular Mobility with Shoulder to 90° FF   Scapular winging: minimal  Scapular elevation: minimal    Strength/Myotome Testing     Left Shoulder     Planes of Motion   Flexion: 5   Abduction: 4-   External rotation at  0°: 4-   Internal rotation at 0°: 5     Right Shoulder   Normal muscle strength             Precautions: none  Dx: L shoulder impingement    Manuals 1/23                                       Neuro Re-Ed                                                                Ther Ex                Corner pec stretch 3x:10       Wall push ups 10x               Wall slides flex, single, ea 10x L               TB rows and extension Green 10x ea- gave blue for HEP       TB B ER Yellow 5x- pain L shoulder (mild)                                                                       Ther Activity                        Gait Training                        Modalities

## 2024-02-22 ENCOUNTER — TELEPHONE (OUTPATIENT)
Dept: INTERNAL MEDICINE CLINIC | Facility: CLINIC | Age: 84
End: 2024-02-22

## 2024-02-22 DIAGNOSIS — I10 ESSENTIAL HYPERTENSION: ICD-10-CM

## 2024-02-22 NOTE — TELEPHONE ENCOUNTER
Prior authorization needed for Irbesartan 150mg.     Scanned int media, sent to Clinical [facility]

## 2024-02-23 DIAGNOSIS — I10 ESSENTIAL HYPERTENSION: Primary | ICD-10-CM

## 2024-02-23 RX ORDER — IRBESARTAN 150 MG/1
150 TABLET ORAL
Qty: 90 TABLET | Refills: 1 | Status: SHIPPED | OUTPATIENT
Start: 2024-02-23 | End: 2024-05-23

## 2024-02-23 RX ORDER — IRBESARTAN 75 MG/1
75 TABLET ORAL
Qty: 90 TABLET | Refills: 1 | Status: SHIPPED | OUTPATIENT
Start: 2024-02-23

## 2024-02-26 RX ORDER — IRBESARTAN 75 MG/1
75 TABLET ORAL
Qty: 90 TABLET | Refills: 0 | Status: SHIPPED | OUTPATIENT
Start: 2024-02-26

## 2024-02-26 RX ORDER — IRBESARTAN 150 MG/1
150 TABLET ORAL
Qty: 90 TABLET | Refills: 0 | Status: SHIPPED | OUTPATIENT
Start: 2024-02-26

## 2024-02-28 NOTE — TELEPHONE ENCOUNTER
[facility] ([place]: [id number])    form thumbnail  This request has received a Favorable outcome.   APPROVED

## 2024-04-15 LAB
BUN SERPL-MCNC: 21 MG/DL (ref 7–25)
BUN/CREAT SERPL: ABNORMAL (CALC) (ref 6–22)
CALCIUM SERPL-MCNC: 9.5 MG/DL (ref 8.6–10.3)
CHLORIDE SERPL-SCNC: 104 MMOL/L (ref 98–110)
CO2 SERPL-SCNC: 26 MMOL/L (ref 20–32)
CREAT SERPL-MCNC: 1.06 MG/DL (ref 0.7–1.22)
GFR/BSA.PRED SERPLBLD CYS-BASED-ARV: 69 ML/MIN/1.73M2
GLUCOSE SERPL-MCNC: 114 MG/DL (ref 65–99)
POTASSIUM SERPL-SCNC: 4.5 MMOL/L (ref 3.5–5.3)
SODIUM SERPL-SCNC: 140 MMOL/L (ref 135–146)

## 2024-04-22 ENCOUNTER — RA CDI HCC (OUTPATIENT)
Dept: OTHER | Facility: HOSPITAL | Age: 84
End: 2024-04-22

## 2024-04-26 ENCOUNTER — OFFICE VISIT (OUTPATIENT)
Dept: INTERNAL MEDICINE CLINIC | Facility: CLINIC | Age: 84
End: 2024-04-26
Payer: MEDICARE

## 2024-04-26 VITALS
DIASTOLIC BLOOD PRESSURE: 72 MMHG | TEMPERATURE: 97.6 F | SYSTOLIC BLOOD PRESSURE: 130 MMHG | WEIGHT: 182 LBS | BODY MASS INDEX: 29.25 KG/M2 | OXYGEN SATURATION: 96 % | HEART RATE: 73 BPM | HEIGHT: 66 IN

## 2024-04-26 DIAGNOSIS — I10 ESSENTIAL HYPERTENSION: Primary | ICD-10-CM

## 2024-04-26 DIAGNOSIS — R09.82 POST-NASAL DRAINAGE: ICD-10-CM

## 2024-04-26 DIAGNOSIS — N40.1 BPH ASSOCIATED WITH NOCTURIA: ICD-10-CM

## 2024-04-26 DIAGNOSIS — R06.2 WHEEZING: ICD-10-CM

## 2024-04-26 DIAGNOSIS — E78.2 MIXED HYPERLIPIDEMIA: ICD-10-CM

## 2024-04-26 DIAGNOSIS — M43.06 LUMBAR SPONDYLOLYSIS: ICD-10-CM

## 2024-04-26 DIAGNOSIS — R35.1 BPH ASSOCIATED WITH NOCTURIA: ICD-10-CM

## 2024-04-26 DIAGNOSIS — I73.9 PVD (PERIPHERAL VASCULAR DISEASE) (HCC): ICD-10-CM

## 2024-04-26 PROCEDURE — 99214 OFFICE O/P EST MOD 30 MIN: CPT | Performed by: INTERNAL MEDICINE

## 2024-04-26 PROCEDURE — G2211 COMPLEX E/M VISIT ADD ON: HCPCS | Performed by: INTERNAL MEDICINE

## 2024-04-26 RX ORDER — IRBESARTAN 75 MG/1
75 TABLET ORAL
Qty: 90 TABLET | Refills: 1 | Status: SHIPPED | OUTPATIENT
Start: 2024-04-26

## 2024-04-26 RX ORDER — IRBESARTAN 150 MG/1
150 TABLET ORAL
Qty: 90 TABLET | Refills: 1 | Status: SHIPPED | OUTPATIENT
Start: 2024-04-26

## 2024-04-26 NOTE — PROGRESS NOTES
Assessment/Plan:           1. Essential hypertension  Comments:  Continue amlodipine 5 mg and irbesartan 225 mg at bedtime.  Orders:  -     irbesartan (AVAPRO) 75 mg tablet; Take 1 tablet (75 mg total) by mouth daily at bedtime Total 225 mg daily  -     irbesartan (AVAPRO) 150 mg tablet; Take 1 tablet (150 mg total) by mouth daily at bedtime Total 225 mg daily    2. Mixed hyperlipidemia  Comments:  Continue low-cholesterol diet.    3. Wheezing  Comments:  No wheezes at this time continue to monitor.    4. Post-nasal drainage  Comments:  Discussed using saline nasal spray.    5. PVD (peripheral vascular disease) ([facility])    6. BPH associated with nocturia  Comments:  If symptoms worsen medication could be started.    7. Lumbar spondylolysis  Comments:  Intermittent worsening Tylenol advised.           1. Essential hypertension      2. Mixed hyperlipidemia         No problem-specific Assessment & Plan notes found for this encounter.           Subjective:      Patient ID: Rayray Dunn is a 83 y.o. male.    HPI    The following portions of the patient's history were reviewed and updated as appropriate: He  has a past medical history of Arthritis, Basal cell carcinoma, Hypertension, Kidney stone, Skin cancer, and Squamous cell carcinoma of skin.  He   Patient Active Problem List    Diagnosis Date Noted    Impingement syndrome of left shoulder 01/15/2024    Primary osteoarthritis of left shoulder 01/10/2023    Impingement syndrome of right shoulder 04/25/2022    Primary osteoarthritis of right shoulder 04/25/2022    PVD (peripheral vascular disease) ([facility]) 04/14/2021    Lumbar stenosis with neurogenic claudication     BPH associated with nocturia 12/14/2015    White coat syndrome with diagnosis of hypertension 05/18/2012    Mixed hyperlipidemia 05/18/2012     He  has a past surgical history that includes Kidney stone surgery (1982); Meniscectomy (2000); Tonsillectomy and adenoidectomy (1940); and pr ndsc wrst surg w/rls  charleen casarez (Right, 1/19/2021).  His family history includes Arthritis in his family; Breast cancer in his mother; Cancer in his family; Heart disease in his father; Hypertension in his family.  He  reports that he has never smoked. He has never used smokeless tobacco. He reports that he does not drink alcohol and does not use drugs.  Current Outpatient Medications   Medication Sig Dispense Refill    acetaminophen (TYLENOL) 650 mg CR tablet Take 1 tablet (650 mg total) by mouth every 8 (eight) hours as needed for mild pain 30 tablet 0    amLODIPine (NORVASC) 5 mg tablet Take 1 tablet (5 mg total) by mouth daily 90 tablet 3    Cholecalciferol (VITAMIN D3) 30266 units TABS Take 2,000 Units by mouth daily       Coenzyme Q10 (COQ10) 100 MG CAPS Take 200 mg by mouth daily      irbesartan (AVAPRO) 150 mg tablet Take 1 tablet (150 mg total) by mouth daily at bedtime Total 225 mg daily 90 tablet 1    irbesartan (AVAPRO) 75 mg tablet Take 1 tablet (75 mg total) by mouth daily at bedtime Total 225 mg daily 90 tablet 1    multivitamin (THERAGRAN) TABS Take 1 tablet by mouth daily      NON FORMULARY RELIEF FACTOR - ICARIIN 400MG , OMEGA 2800MG, CURCUMIN 1334MG, RESERVATROL 140 MG      Omega-3 Fatty Acids (OMEGA-3 CF) 1000 MG CAPS Take 1 capsule by mouth daily      triamcinolone (KENALOG) 0.1 % cream        No current facility-administered medications for this visit.     Current Outpatient Medications on File Prior to Visit   Medication Sig    acetaminophen (TYLENOL) 650 mg CR tablet Take 1 tablet (650 mg total) by mouth every 8 (eight) hours as needed for mild pain    amLODIPine (NORVASC) 5 mg tablet Take 1 tablet (5 mg total) by mouth daily    Cholecalciferol (VITAMIN D3) 93456 units TABS Take 2,000 Units by mouth daily     Coenzyme Q10 (COQ10) 100 MG CAPS Take 200 mg by mouth daily    multivitamin (THERAGRAN) TABS Take 1 tablet by mouth daily    NON FORMULARY RELIEF FACTOR - ICARIIN 400MG , OMEGA 2800MG, CURCUMIN  1334MG, RESERVATROL 140 MG    Omega-3 Fatty Acids (OMEGA-3 CF) 1000 MG CAPS Take 1 capsule by mouth daily    triamcinolone (KENALOG) 0.1 % cream     [DISCONTINUED] irbesartan (AVAPRO) 150 mg tablet Take 1 tablet (150 mg total) by mouth daily at bedtime    [DISCONTINUED] irbesartan (AVAPRO) 75 mg tablet Take 1 tablet (75 mg total) by mouth daily at bedtime    [DISCONTINUED] carbamide peroxide (DEBROX) 6.5 % otic solution Administer 5 drops into the left ear daily at bedtime (Patient not taking: Reported on 4/26/2024)    [DISCONTINUED] irbesartan (AVAPRO) 150 mg tablet Take 1 tablet (150 mg total) by mouth daily at bedtime Total 225 mg daily (Patient not taking: Reported on 4/26/2024)    [DISCONTINUED] irbesartan (AVAPRO) 75 mg tablet Take 1 tablet (75 mg total) by mouth daily at bedtime Total 225 mg daily (Patient not taking: Reported on 4/26/2024)    [DISCONTINUED] Magnesium 400 MG CAPS Take by mouth 400MG (Patient not taking: Reported on 1/17/2024)    [DISCONTINUED] sodium chloride (OCEAN) 0.65 % nasal spray 1 spray into each nostril every 3 (three) hours (Patient not taking: Reported on 4/26/2024)     No current facility-administered medications on file prior to visit.     Medications Discontinued During This Encounter   Medication Reason    irbesartan (AVAPRO) 75 mg tablet Duplicate order    irbesartan (AVAPRO) 150 mg tablet Duplicate order    Magnesium 400 MG CAPS     sodium chloride (OCEAN) 0.65 % nasal spray Therapy completed    carbamide peroxide (DEBROX) 6.5 % otic solution Therapy completed    irbesartan (AVAPRO) 75 mg tablet Reorder    irbesartan (AVAPRO) 150 mg tablet Reorder      He is allergic to sulfate..    Review of Systems   Constitutional:  Negative for appetite change, chills, fatigue and fever.   HENT:  Negative for sore throat and trouble swallowing.    Eyes:  Negative for redness.   Respiratory:  Positive for wheezing. Negative for shortness of breath.    Cardiovascular:  Negative for chest  "pain and palpitations.   Gastrointestinal:  Negative for abdominal pain, constipation and diarrhea.   Genitourinary:  Negative for dysuria and hematuria.   Musculoskeletal:  Positive for arthralgias and back pain. Negative for neck pain.   Skin:  Negative for rash.   Neurological:  Negative for seizures, weakness and headaches.   Hematological:  Negative for adenopathy.   Psychiatric/Behavioral:  Negative for confusion. The patient is not nervous/anxious.          Objective:      /72 (BP Location: Left arm, Patient Position: Sitting, Cuff Size: Standard)   Pulse 73   Temp 97.6 °F (36.4 °C) (Temporal)   Ht 5' 6\" (1.676 m)   Wt 82.6 kg (182 lb)   SpO2 96%   BMI 29.38 kg/m²     Results Reviewed       None            Recent Results (from the past 1344 hour(s))   Basic metabolic panel    Collection Time: 04/15/24  8:06 AM   Result Value Ref Range    Glucose, Random 114 (H) 65 - 99 mg/dL    BUN 21 7 - 25 mg/dL    Creatinine 1.06 0.70 - 1.22 mg/dL    eGFR 69 > OR = 60 mL/min/1.73m2    SL AMB BUN/CREATININE RATIO SEE NOTE: 6 - 22 (calc)    Sodium 140 135 - 146 mmol/L    Potassium 4.5 3.5 - 5.3 mmol/L    Chloride 104 98 - 110 mmol/L    CO2 26 20 - 32 mmol/L    Calcium 9.5 8.6 - 10.3 mg/dL        Physical Exam  Constitutional:       General: He is not in acute distress.     Appearance: Normal appearance.   HENT:      Head: Normocephalic and atraumatic.      Nose: Nose normal.      Mouth/Throat:      Mouth: Mucous membranes are moist.   Eyes:      Extraocular Movements: Extraocular movements intact.      Pupils: Pupils are equal, round, and reactive to light.   Cardiovascular:      Rate and Rhythm: Normal rate and regular rhythm.      Pulses: Normal pulses.      Heart sounds: Normal heart sounds. No murmur heard.     No friction rub.   Pulmonary:      Effort: Pulmonary effort is normal. No respiratory distress.      Breath sounds: Normal breath sounds. No wheezing.   Abdominal:      General: Abdomen is flat. Bowel " sounds are normal. There is no distension.      Palpations: Abdomen is soft. There is no mass.      Tenderness: There is no abdominal tenderness. There is no guarding.   Musculoskeletal:         General: Normal range of motion.      Cervical back: Normal range of motion.   Neurological:      General: No focal deficit present.      Mental Status: He is alert and oriented to person, place, and time. Mental status is at baseline.      Cranial Nerves: No cranial nerve deficit.   Psychiatric:         Mood and Affect: Mood normal.         Behavior: Behavior normal.

## 2024-06-24 ENCOUNTER — 6 MONTH FOLLOW UP (OUTPATIENT)
Dept: URBAN - METROPOLITAN AREA CLINIC 6 | Facility: CLINIC | Age: 84
End: 2024-06-24

## 2024-06-24 DIAGNOSIS — H10.45: ICD-10-CM

## 2024-06-24 DIAGNOSIS — H18.513: ICD-10-CM

## 2024-06-24 DIAGNOSIS — H25.813: ICD-10-CM

## 2024-06-24 DIAGNOSIS — H18.593: ICD-10-CM

## 2024-06-24 DIAGNOSIS — H35.371: ICD-10-CM

## 2024-06-24 DIAGNOSIS — H57.811: ICD-10-CM

## 2024-06-24 DIAGNOSIS — H02.832: ICD-10-CM

## 2024-06-24 DIAGNOSIS — H35.3131: ICD-10-CM

## 2024-06-24 DIAGNOSIS — H02.835: ICD-10-CM

## 2024-06-24 PROCEDURE — 92014 COMPRE OPH EXAM EST PT 1/>: CPT

## 2024-06-24 PROCEDURE — 92134 CPTRZ OPH DX IMG PST SGM RTA: CPT

## 2024-06-24 ASSESSMENT — TONOMETRY
OD_IOP_MMHG: 14
OS_IOP_MMHG: 12

## 2024-06-24 ASSESSMENT — VISUAL ACUITY
OD_CC: 20/30
OS_CC: 20/30

## 2024-09-09 ENCOUNTER — OFFICE VISIT (OUTPATIENT)
Dept: OBGYN CLINIC | Facility: OTHER | Age: 84
End: 2024-09-09
Payer: MEDICARE

## 2024-09-09 ENCOUNTER — TELEPHONE (OUTPATIENT)
Age: 84
End: 2024-09-09

## 2024-09-09 ENCOUNTER — TELEPHONE (OUTPATIENT)
Dept: OBGYN CLINIC | Facility: HOSPITAL | Age: 84
End: 2024-09-09

## 2024-09-09 VITALS
DIASTOLIC BLOOD PRESSURE: 70 MMHG | SYSTOLIC BLOOD PRESSURE: 161 MMHG | HEIGHT: 66 IN | BODY MASS INDEX: 29.25 KG/M2 | WEIGHT: 182 LBS | HEART RATE: 91 BPM

## 2024-09-09 DIAGNOSIS — M75.42 IMPINGEMENT SYNDROME OF LEFT SHOULDER: ICD-10-CM

## 2024-09-09 DIAGNOSIS — M75.41 IMPINGEMENT SYNDROME OF RIGHT SHOULDER: ICD-10-CM

## 2024-09-09 DIAGNOSIS — M19.011 PRIMARY OSTEOARTHRITIS OF RIGHT SHOULDER: Primary | ICD-10-CM

## 2024-09-09 DIAGNOSIS — S46.012A ROTATOR CUFF STRAIN, LEFT, INITIAL ENCOUNTER: Primary | ICD-10-CM

## 2024-09-09 DIAGNOSIS — M19.012 PRIMARY OSTEOARTHRITIS OF LEFT SHOULDER: ICD-10-CM

## 2024-09-09 DIAGNOSIS — M19.011 PRIMARY OSTEOARTHRITIS OF RIGHT SHOULDER: ICD-10-CM

## 2024-09-09 PROCEDURE — 99214 OFFICE O/P EST MOD 30 MIN: CPT | Performed by: ORTHOPAEDIC SURGERY

## 2024-09-09 PROCEDURE — 20610 DRAIN/INJ JOINT/BURSA W/O US: CPT | Performed by: ORTHOPAEDIC SURGERY

## 2024-09-09 RX ORDER — BETAMETHASONE SODIUM PHOSPHATE AND BETAMETHASONE ACETATE 3; 3 MG/ML; MG/ML
6 INJECTION, SUSPENSION INTRA-ARTICULAR; INTRALESIONAL; INTRAMUSCULAR; SOFT TISSUE
Status: COMPLETED | OUTPATIENT
Start: 2024-09-09 | End: 2024-09-09

## 2024-09-09 RX ORDER — BUPIVACAINE HYDROCHLORIDE 2.5 MG/ML
2 INJECTION, SOLUTION INFILTRATION; PERINEURAL
Status: COMPLETED | OUTPATIENT
Start: 2024-09-09 | End: 2024-09-09

## 2024-09-09 RX ADMIN — BUPIVACAINE HYDROCHLORIDE 2 ML: 2.5 INJECTION, SOLUTION INFILTRATION; PERINEURAL at 10:15

## 2024-09-09 RX ADMIN — BETAMETHASONE SODIUM PHOSPHATE AND BETAMETHASONE ACETATE 6 MG: 3; 3 INJECTION, SUSPENSION INTRA-ARTICULAR; INTRALESIONAL; INTRAMUSCULAR; SOFT TISSUE at 10:15

## 2024-09-09 NOTE — TELEPHONE ENCOUNTER
Caller: Patient    Doctor: Josie    Reason for call: Patient was in today to see you and he said he was supposed to start PT. He called to schedule but there is no script in yet. Can you please place PT script so he can schedule. Thank you.    Call back#: [phone]

## 2024-09-09 NOTE — PROGRESS NOTES
"  Assessment  Diagnoses and all orders for this visit:    Rotator cuff strain, left, initial encounter    Primary osteoarthritis of left shoulder    Primary osteoarthritis of right shoulder    Discussion and Plan:    Explained to the patient that his examination is worrisome for a rotator cuff tear of his left shoulder after his acute injury last week. He was offered to obtain an MRI scan of the left shoulder but he declined this option as his symptoms are improving and wished to continue with a cortisone injection and HEP. I certainly think this is a reasonable option for the patient due to his age and even if he did have an acute rotator cuff tear the healing rates in an individual older than 80 are low. He understood and all questions were answered  He also mentioned mild right shoulder pain due to underlying mild glenohumeral joint osteoarthritis and subacromial impingement syndrome.   A cortisone injection was performed today into his bilateral shoulders at his request.   Continue HEP as tolerated  Follow up on an as needed basis    Subjective:   Patient ID: Rayray Dunn is a 83 y.o. male presents today for a follow up visit for his bilateral shoulder. Left worse than right. Today, patient reports that about 1 week ago he missed a step going down a ladder and reached up with his LEFT arm to hold on to the ladder so he wouldn't fall and \"pulled\" his shoulder. He reports improving symptoms/pain since the initial injury but still limitations and pain when reaching out or extending his left arm. He also associates a \"weakness\" about his left upper extremity with certain activities. As for his RIGHT shoulder, he has known mild glenohumeral joint osteoarthritis with rotator cuff tendonitis. This has been mildly symptomatic now that he has been using his right arm for more things following the acute injury to his left shoulder. No numbness or tingling. No fevers or chills.       The following portions of the " "patient's history were reviewed and updated as appropriate: allergies, current medications, past family history, past medical history, past social history, past surgical history and problem list.    Objective:  /70   Pulse 91   Ht 5' 6\" (1.676 m)   Wt 82.6 kg (182 lb)   BMI 29.38 kg/m²       Right Shoulder Exam     Range of Motion   The patient has normal right shoulder ROM.    Muscle Strength   Abduction: 5/5     Tests   Impingement: positive    Other   Erythema: absent  Sensation: normal  Pulse: present      Left Shoulder Exam     Range of Motion   External rotation:  40   Forward flexion:  140     Muscle Strength   Abduction: 4/5   External rotation: 4/5     Tests   Drop arm: positive    Other   Erythema: absent  Sensation: normal  Pulse: present             Physical Exam  Constitutional:       General: He is not in acute distress.     Appearance: He is well-developed.   Eyes:      Conjunctiva/sclera: Conjunctivae normal.      Pupils: Pupils are equal, round, and reactive to light.   Cardiovascular:      Rate and Rhythm: Normal rate and regular rhythm.   Pulmonary:      Effort: Pulmonary effort is normal.      Breath sounds: Normal breath sounds.   Abdominal:      General: Bowel sounds are normal.      Palpations: Abdomen is soft.   Musculoskeletal:      Cervical back: Normal range of motion and neck supple.   Skin:     General: Skin is warm and dry.      Findings: No erythema or rash.   Neurological:      Mental Status: He is alert and oriented to person, place, and time.      Deep Tendon Reflexes: Reflexes are normal and symmetric.   Psychiatric:         Behavior: Behavior normal.       Large joint arthrocentesis: bilateral subacromial bursa  Christoval Protocol:  procedure performed by consultantConsent: Verbal consent obtained.  Risks and benefits: risks, benefits and alternatives were discussed  Consent given by: patient  Time out: Immediately prior to procedure a \"time out\" was called to verify the " correct patient, procedure, equipment, support staff and site/side marked as required.  Site marked: the operative site was marked  Radiology Images displayed and confirmed. If images not available, report reviewed: imaging studies available  Patient identity confirmed: verbally with patient  Supporting Documentation  Indications: pain and diagnostic evaluation   Procedure Details  Location: shoulder - bilateral subacromial bursa  Preparation: Patient was prepped and draped in the usual sterile fashion  Needle size: 22 G  Ultrasound guidance: no  Approach: lateral    Medications (Right): 2 mL bupivacaine 0.25 %; 6 mg betamethasone acetate-betamethasone sodium phosphate 6 (3-3) mg/mLMedications (Left): 2 mL bupivacaine 0.25 %; 6 mg betamethasone acetate-betamethasone sodium phosphate 6 (3-3) mg/mL   Patient tolerance: patient tolerated the procedure well with no immediate complications  Dressing:  Sterile dressing applied        Scribe Attestation      I,:  Mark Salcido am acting as a scribe while in the presence of the attending physician.:       I,:  Felix Galindo MD personally performed the services described in this documentation    as scribed in my presence.:

## 2024-09-09 NOTE — TELEPHONE ENCOUNTER
Called and spoke to patient, he will go to [facility] and have his labs drawn.

## 2024-09-09 NOTE — TELEPHONE ENCOUNTER
Pt called to see if Dr. Mendieta would like him to get a full bloodwork done before AWV on 9/23 so that he may  the scripts in office. Pt has active labs from 9/20/23 if still valid but please confirm; please advise and call pt once ready to  or confirm which [facility] he goes to so that the labs can be faxed over.

## 2024-09-11 ENCOUNTER — TELEPHONE (OUTPATIENT)
Age: 84
End: 2024-09-11

## 2024-09-11 NOTE — TELEPHONE ENCOUNTER
Caller: Patient    Doctor: Dr. Galindo    Reason for call: Patient calling stating that he had CSI to B/L shoulders on 9/9/24 and he is wondering what medication was used and he is scheduled for BMP on 9/13/24 and he is wondering if this will affect the results in any way as he can r/s the blood work.     Call back#: [phone]

## 2024-09-12 NOTE — TELEPHONE ENCOUNTER
Called patient @ # file  No answer  LVM letting patient know injection he received should not affect his blood work and   The steroid that was used for the injection was Betamethasone    Asked patient to call back if he had any other concerns

## 2024-09-13 ENCOUNTER — APPOINTMENT (OUTPATIENT)
Dept: LAB | Age: 84
End: 2024-09-13
Payer: MEDICARE

## 2024-09-13 DIAGNOSIS — R73.01 IMPAIRED FASTING BLOOD SUGAR: ICD-10-CM

## 2024-09-13 DIAGNOSIS — I10 ESSENTIAL HYPERTENSION: ICD-10-CM

## 2024-09-13 DIAGNOSIS — E78.2 MIXED HYPERLIPIDEMIA: ICD-10-CM

## 2024-09-13 LAB
ALBUMIN SERPL BCG-MCNC: 4 G/DL (ref 3.5–5)
ALP SERPL-CCNC: 62 U/L (ref 34–104)
ALT SERPL W P-5'-P-CCNC: 19 U/L (ref 7–52)
ANION GAP SERPL CALCULATED.3IONS-SCNC: 7 MMOL/L (ref 4–13)
AST SERPL W P-5'-P-CCNC: 19 U/L (ref 13–39)
BASOPHILS # BLD AUTO: 0.09 THOUSANDS/ΜL (ref 0–0.1)
BASOPHILS NFR BLD AUTO: 1 % (ref 0–1)
BILIRUB SERPL-MCNC: 0.99 MG/DL (ref 0.2–1)
BILIRUB UR QL STRIP: NEGATIVE
BUN SERPL-MCNC: 26 MG/DL (ref 5–25)
CALCIUM SERPL-MCNC: 9.6 MG/DL (ref 8.4–10.2)
CHLORIDE SERPL-SCNC: 99 MMOL/L (ref 96–108)
CHOLEST SERPL-MCNC: 135 MG/DL
CLARITY UR: CLEAR
CO2 SERPL-SCNC: 29 MMOL/L (ref 21–32)
COLOR UR: NORMAL
CREAT SERPL-MCNC: 1.05 MG/DL (ref 0.6–1.3)
EOSINOPHIL # BLD AUTO: 0.35 THOUSAND/ΜL (ref 0–0.61)
EOSINOPHIL NFR BLD AUTO: 3 % (ref 0–6)
ERYTHROCYTE [DISTWIDTH] IN BLOOD BY AUTOMATED COUNT: 13.2 % (ref 11.6–15.1)
EST. AVERAGE GLUCOSE BLD GHB EST-MCNC: 137 MG/DL
GFR SERPL CREATININE-BSD FRML MDRD: 64 ML/MIN/1.73SQ M
GLUCOSE P FAST SERPL-MCNC: 103 MG/DL (ref 65–99)
GLUCOSE UR STRIP-MCNC: NEGATIVE MG/DL
HBA1C MFR BLD: 6.4 %
HCT VFR BLD AUTO: 46.9 % (ref 36.5–49.3)
HDLC SERPL-MCNC: 51 MG/DL
HGB BLD-MCNC: 15 G/DL (ref 12–17)
HGB UR QL STRIP.AUTO: NEGATIVE
IMM GRANULOCYTES # BLD AUTO: 0.07 THOUSAND/UL (ref 0–0.2)
IMM GRANULOCYTES NFR BLD AUTO: 1 % (ref 0–2)
KETONES UR STRIP-MCNC: NEGATIVE MG/DL
LDLC SERPL CALC-MCNC: 64 MG/DL (ref 0–100)
LEUKOCYTE ESTERASE UR QL STRIP: NEGATIVE
LYMPHOCYTES # BLD AUTO: 3.07 THOUSANDS/ΜL (ref 0.6–4.47)
LYMPHOCYTES NFR BLD AUTO: 28 % (ref 14–44)
MCH RBC QN AUTO: 30.2 PG (ref 26.8–34.3)
MCHC RBC AUTO-ENTMCNC: 32 G/DL (ref 31.4–37.4)
MCV RBC AUTO: 94 FL (ref 82–98)
MONOCYTES # BLD AUTO: 1.32 THOUSAND/ΜL (ref 0.17–1.22)
MONOCYTES NFR BLD AUTO: 12 % (ref 4–12)
NEUTROPHILS # BLD AUTO: 5.97 THOUSANDS/ΜL (ref 1.85–7.62)
NEUTS SEG NFR BLD AUTO: 55 % (ref 43–75)
NITRITE UR QL STRIP: NEGATIVE
NONHDLC SERPL-MCNC: 84 MG/DL
NRBC BLD AUTO-RTO: 0 /100 WBCS
PH UR STRIP.AUTO: 7 [PH]
PLATELET # BLD AUTO: 280 THOUSANDS/UL (ref 149–390)
PMV BLD AUTO: 11.2 FL (ref 8.9–12.7)
POTASSIUM SERPL-SCNC: 4.5 MMOL/L (ref 3.5–5.3)
PROT SERPL-MCNC: 6.8 G/DL (ref 6.4–8.4)
PROT UR STRIP-MCNC: NEGATIVE MG/DL
RBC # BLD AUTO: 4.97 MILLION/UL (ref 3.88–5.62)
SODIUM SERPL-SCNC: 135 MMOL/L (ref 135–147)
SP GR UR STRIP.AUTO: 1.01 (ref 1–1.03)
TRIGL SERPL-MCNC: 101 MG/DL
TSH SERPL DL<=0.05 MIU/L-ACNC: 4.51 UIU/ML (ref 0.45–4.5)
UROBILINOGEN UR STRIP-ACNC: <2 MG/DL
WBC # BLD AUTO: 10.87 THOUSAND/UL (ref 4.31–10.16)

## 2024-09-13 PROCEDURE — 85025 COMPLETE CBC W/AUTO DIFF WBC: CPT

## 2024-09-13 PROCEDURE — 84443 ASSAY THYROID STIM HORMONE: CPT

## 2024-09-13 PROCEDURE — 36415 COLL VENOUS BLD VENIPUNCTURE: CPT

## 2024-09-13 PROCEDURE — 80053 COMPREHEN METABOLIC PANEL: CPT

## 2024-09-13 PROCEDURE — 80061 LIPID PANEL: CPT

## 2024-09-13 PROCEDURE — 83036 HEMOGLOBIN GLYCOSYLATED A1C: CPT

## 2024-09-16 ENCOUNTER — EVALUATION (OUTPATIENT)
Dept: PHYSICAL THERAPY | Facility: REHABILITATION | Age: 84
End: 2024-09-16
Payer: MEDICARE

## 2024-09-16 DIAGNOSIS — M19.011 PRIMARY OSTEOARTHRITIS OF RIGHT SHOULDER: Primary | ICD-10-CM

## 2024-09-16 DIAGNOSIS — M19.012 PRIMARY OSTEOARTHRITIS OF LEFT SHOULDER: ICD-10-CM

## 2024-09-16 DIAGNOSIS — M75.42 IMPINGEMENT SYNDROME OF LEFT SHOULDER: ICD-10-CM

## 2024-09-16 DIAGNOSIS — M75.41 IMPINGEMENT SYNDROME OF RIGHT SHOULDER: ICD-10-CM

## 2024-09-16 PROCEDURE — 97110 THERAPEUTIC EXERCISES: CPT | Performed by: PHYSICAL THERAPIST

## 2024-09-16 PROCEDURE — 97161 PT EVAL LOW COMPLEX 20 MIN: CPT | Performed by: PHYSICAL THERAPIST

## 2024-09-16 NOTE — PROGRESS NOTES
PT Evaluation     Today's date: 2024  Patient name: Rayray Dunn  : 1940  MRN: [id number]  Referring provider: Anel White P*  Dx:   Encounter Diagnosis     ICD-10-CM    1. Primary osteoarthritis of right shoulder  M19.011 Ambulatory Referral to Physical Therapy      2. Primary osteoarthritis of left shoulder  M19.012 Ambulatory Referral to Physical Therapy      3. Impingement syndrome of left shoulder  M75.42 Ambulatory Referral to Physical Therapy      4. Impingement syndrome of right shoulder  M75.41 Ambulatory Referral to Physical Therapy                     Assessment  Impairments: abnormal or restricted ROM, activity intolerance, impaired physical strength, lacks appropriate home exercise program, pain with function and activity limitations    Assessment details: Patient presents with pain, decreased shoulder ROM, decreased shoulder/scapular strength, and decreased function secondary to b/l shoulder RTC arthropathy/OA.  Patient would benefit from skilled PT intervention to address these issues and to maximize function.  Thank you for the referral.  Understanding of Dx/Px/POC: good     Prognosis: good    Goals  Short Term:  Pt will report decreased levels of pain by at least 2 subjective ratings in 4 weeks  Pt will demonstrate improved ROM by at least 10 degrees in 4 weeks  Pt will demonstrate improved strength by 1/2 grade MMT in 4 weeks  Long Term:   Pt will be independent in their HEP in 8 weeks  Pt will demonstrate improved FOTO, > predicted level  Pt will be independent with all ADL's    Plan  Patient would benefit from: skilled physical therapy    Planned therapy interventions: joint mobilization, manual therapy, neuromuscular re-education, patient/caregiver education, strengthening, stretching, therapeutic activities, therapeutic exercise, postural training, functional ROM exercises, graded exercise and home exercise program    Frequency: 2x week  Duration in weeks: 8  Plan of  Care beginning date: 2024  Plan of Care expiration date: 2024  Treatment plan discussed with: patient  Plan details: Patient was educated in HEP and Plan of Care.  All questions were answered to pt's satisfaction.        Subjective Evaluation    History of Present Illness  Mechanism of injury: Pt is an 83 y.o male with a c/o L>R shoulder pain.  Pt has had injections in both shoulders a few years ago.  Pt misstepped coming down from a ladder and caught himself with his L UE on the ladder about 2 weeks ago.  Pt saw Dr. Galindo and MD is concerned pt might have a RTC tear.  Pt was given an injection with some relief and is now referred for PT.  Pt is R hand dominant and reports pain/difficulty with reaching behind back, quick movements, lifting things, donning belt, reaching across body to wash himself, carrying keyboard equipment (pt plays the keyboard), driving (improving).  Pt had radiographs in the past that showed OA.    Patient Goals  Patient goals for therapy: decreased pain, increased motion, increased strength and independence with ADLs/IADLs    Pain  At best pain ratin  At worst pain rating: 10  Location: L shoulder      Diagnostic Tests  X-ray: abnormal (mod OA)  Treatments  Previous treatment: injection treatment  Current treatment: injection treatment      Objective     Static Posture     Comments  Pt reports crepitus, although not painful.      Tenderness     Additional Tenderness Details  No TTP noted.    Neurological Testing     Additional Neurological Details  Pt denies n/t currently, but notes hx of L carpal tunnel.      Active Range of Motion   Left Shoulder   Flexion: 150 degrees   Abduction: WFL  External rotation 0°: WFL  External rotation BTH: WFL  Internal rotation BTB: sacrum     Right Shoulder   Flexion: 160 degrees   Abduction: WFL  External rotation 0°: WFL  External rotation BTH: WFL  Internal rotation BTB: L1     Passive Range of Motion   Left Shoulder   Flexion:  WFL  Abduction: WFL  External rotation 90°: 60 degrees with pain  Internal rotation 90°: 45 degrees     Right Shoulder   Flexion: WFL  Abduction: WFL  External rotation 90°: WFL  Internal rotation 90°: 45 degrees     Strength/Myotome Testing     Left Shoulder     Planes of Motion   Flexion: 4   Abduction: 4+ (mild pain\)   External rotation at 0°: 4- (pain)   Internal rotation at 0°: 3+ (pain)     Isolated Muscles   Biceps: 5   Lower trapezius: 4-   Middle trapezius: 3+   Triceps: 5     Right Shoulder     Planes of Motion   Flexion: 4+   Abduction: 5   External rotation at 0°: 3+ (mild pain)   Internal rotation at 0°: 4+     Isolated Muscles   Biceps: 5   Lower trapezius: 3-   Middle trapezius: 4-   Triceps: 5     Tests     Left Shoulder   Positive empty can and Hawkin's.   Negative crossover, drop arm, external rotation lag sign, full can and Neer's.     Right Shoulder   Negative crossover, drop arm, empty can, external rotation lag sign, full can, Hawkin's and Neer's.            Precautions: OA, HTN, hx of skin cancer  POC expires Unit limit Auth Expiration date PT/OT + Visit Limit?   11/11 NA NA BOMN         Visit/Unit Tracking  AUTH Status:  Date 9/16        RE every 10V Used 1         Remaining             Pertinent Findings:                                                                                            Test / Measure  9/16/2024   FOTO (Predicted 66) 48                       Manuals 9/16            PROM L shoulder ER+IR             PROM R shoulder IR             B/l GH post/inf JM's                          Neuro Re-Ed             Taisha LPD             [place] rows             [place] IR             [place] ER             Ball circles against wall cw/ccw                                       Ther Ex             UBE             Serratus wall slides             Supine cane flexion             Supine cane ER L shoulder             Supine scap punches             S/L ER             Prone I+T              Scaption to 90*             Pt education+ HEP instruction TP 8 mins            Ther Activity                                       Gait Training                                       Modalities

## 2024-09-20 ENCOUNTER — OFFICE VISIT (OUTPATIENT)
Dept: PHYSICAL THERAPY | Facility: REHABILITATION | Age: 84
End: 2024-09-20
Payer: MEDICARE

## 2024-09-20 DIAGNOSIS — M75.42 IMPINGEMENT SYNDROME OF LEFT SHOULDER: ICD-10-CM

## 2024-09-20 DIAGNOSIS — M19.011 PRIMARY OSTEOARTHRITIS OF RIGHT SHOULDER: Primary | ICD-10-CM

## 2024-09-20 DIAGNOSIS — M19.012 PRIMARY OSTEOARTHRITIS OF LEFT SHOULDER: ICD-10-CM

## 2024-09-20 DIAGNOSIS — M75.41 IMPINGEMENT SYNDROME OF RIGHT SHOULDER: ICD-10-CM

## 2024-09-20 PROCEDURE — 97140 MANUAL THERAPY 1/> REGIONS: CPT

## 2024-09-20 PROCEDURE — 97112 NEUROMUSCULAR REEDUCATION: CPT

## 2024-09-20 PROCEDURE — 97110 THERAPEUTIC EXERCISES: CPT

## 2024-09-20 NOTE — PROGRESS NOTES
"Daily Note     Today's date: 2024  Patient name: Rayray Dunn  : 1940  MRN: [id number]  Referring provider: Anel White P*  Dx:   Encounter Diagnosis     ICD-10-CM    1. Primary osteoarthritis of right shoulder  M19.011       2. Primary osteoarthritis of left shoulder  M19.012       3. Impingement syndrome of left shoulder  M75.42       4. Impingement syndrome of right shoulder  M75.41           Start Time: 0800  Stop Time: 0845  Total time in clinic (min): 45 minutes    Subjective: Patient states he is doing much better than IE and sates that pain in R shoulder has subsided overall. He does still note \"popping and cracking\" in L shoulder but no pain. He states that showering and lower body dressing has improved.      Objective: See treatment diary below      Assessment: Tolerated treatment well. Patient demonstrated fatigue post treatment and would benefit from continued PT to improve shoulder function. All consent received for manuals. Good response noted to STM with improved PROM noted post. Mod cues for proper scap activation with activities and to avoid UT compensation.       Plan: Continue per plan of care.      Precautions: OA, HTN, hx of skin cancer  POC expires Unit limit Auth Expiration date PT/OT + Visit Limit?    NA NA BOMN         Visit/Unit Tracking  AUTH Status:  Date        RE every 10V Used 1 2        Remaining             Pertinent Findings:                                                                                            Test / Measure  2024   FOTO (Predicted 66) 48                       Manuals            PROM L shoulder ER+IR  LQ           PROM R shoulder IR  LQ           B/l GH post/inf JM's  STM - LQ                        Neuro Re-Ed             De Young LPD  3# 2x10           Taisha rows  4# 2x10           De Young IR             De Young ER             Ball circles against wall cw/ccw                                       Ther Ex    " "         UBE  NV           Serratus wall slides  3x5, 5\"           Supine cane flexion  3x5, 5\"           Supine cane ER L shoulder  B/L L>R 3x5, 5\"           Supine scap punches  B/L 3x5, 3\"           S/L ER             Prone I+T             Scaption to 90*             Pt education+ HEP instruction TP 8 mins 8 min update and review OTB issued           Ther Activity                                       Gait Training                                       Modalities                                       Access Code: [email or web address]  URL: [email or web address]  Date: 09/20/2024  Prepared by: Jocelyn Perez    Exercises  - Standing Bilateral Low Shoulder Row with Anchored Resistance  - 1 x daily - 7 x weekly - 2 sets - 10 reps  - Shoulder extension with resistance - Neutral  - 1 x daily - 7 x weekly - 2 sets - 10 reps  - Single Arm Serratus Punches in Supine with Dumbbell  - 1 x daily - 7 x weekly - 3 sets - 5 reps  - Supine Shoulder External Rotation with Dowel  - 1 x daily - 7 x weekly - 3 sets - 5 reps  - Supine Shoulder Flexion Extension AAROM with Dowel  - 1 x daily - 7 x weekly - 3 sets - 5 reps     "

## 2024-09-23 ENCOUNTER — OFFICE VISIT (OUTPATIENT)
Dept: INTERNAL MEDICINE CLINIC | Facility: CLINIC | Age: 84
End: 2024-09-23
Payer: MEDICARE

## 2024-09-23 VITALS
OXYGEN SATURATION: 96 % | SYSTOLIC BLOOD PRESSURE: 144 MMHG | WEIGHT: 177 LBS | DIASTOLIC BLOOD PRESSURE: 80 MMHG | HEART RATE: 90 BPM | TEMPERATURE: 98.2 F | BODY MASS INDEX: 28.45 KG/M2 | HEIGHT: 66 IN

## 2024-09-23 DIAGNOSIS — Z00.00 MEDICARE ANNUAL WELLNESS VISIT, SUBSEQUENT: ICD-10-CM

## 2024-09-23 DIAGNOSIS — R73.01 IMPAIRED FASTING BLOOD SUGAR: ICD-10-CM

## 2024-09-23 DIAGNOSIS — I10 ESSENTIAL HYPERTENSION: Primary | ICD-10-CM

## 2024-09-23 DIAGNOSIS — M19.012 PRIMARY OSTEOARTHRITIS OF LEFT SHOULDER: ICD-10-CM

## 2024-09-23 DIAGNOSIS — M43.06 LUMBAR SPONDYLOLYSIS: ICD-10-CM

## 2024-09-23 PROCEDURE — 99214 OFFICE O/P EST MOD 30 MIN: CPT | Performed by: INTERNAL MEDICINE

## 2024-09-23 PROCEDURE — G0439 PPPS, SUBSEQ VISIT: HCPCS | Performed by: INTERNAL MEDICINE

## 2024-09-23 NOTE — PROGRESS NOTES
Ambulatory Visit  Name: Rayray Dunn      : 1940      MRN: [id number]  Encounter Provider: Favian Mendieta MD  Encounter Date: 2024   Encounter department: [facility] INTERNAL MEDICINE    Assessment & Plan  Essential hypertension         Primary osteoarthritis of left shoulder         Lumbar spondylolysis         Impaired fasting blood sugar         Medicare annual wellness visit, subsequent           1. Essential hypertension      continue to monitor at home continue current regimen.      2. Primary osteoarthritis of left shoulder      Pain and discomfort has improved.      3. Lumbar spondylolysis        4. Impaired fasting blood sugar      Continue diabetic diet.      5. Medicare annual wellness visit, subsequent             Preventive health issues were discussed with patient, and age appropriate screening tests were ordered as noted in patient's After Visit Summary. Personalized health advice and appropriate referrals for health education or preventive services given if needed, as noted in patient's After Visit Summary.    History of Present Illness     HPI   Patient Care Team:  Favian Mendieta MD as PCP - General (Internal Medicine)  Ruperto Bolden MD    Review of Systems   Constitutional:  Negative for appetite change, chills, fatigue and fever.   HENT:  Negative for sore throat and trouble swallowing.    Eyes:  Negative for redness.   Respiratory:  Negative for shortness of breath.    Cardiovascular:  Negative for chest pain and palpitations.   Gastrointestinal:  Negative for abdominal pain, constipation and diarrhea.   Genitourinary:  Negative for dysuria and hematuria.   Musculoskeletal:  Negative for back pain and neck pain.   Skin:  Negative for rash.   Neurological:  Negative for seizures, weakness and headaches.   Hematological:  Negative for adenopathy.   Psychiatric/Behavioral:  Negative for confusion. The patient is not nervous/anxious.      Medical History Reviewed by  provider this encounter:       Annual Wellness Visit Questionnaire   Rayray is here for his Subsequent Wellness visit. Last Medicare Wellness visit information reviewed, patient interviewed and updates made to the record today.      Health Risk Assessment:   Patient rates overall health as very good. Patient feels that their physical health rating is same. Patient is very satisfied with their life. Eyesight was rated as slightly worse. Hearing was rated as same. Patient feels that their emotional and mental health rating is same. Patients states they are never, rarely angry. Patient states they are often unusually tired/fatigued. Pain experienced in the last 7 days has been some. Patient's pain rating has been 1/10. Patient states that he has experienced no weight loss or gain in last 6 months.     Depression Screening:   PHQ-2 Score: 0      Fall Risk Screening:   In the past year, patient has experienced: history of falling in past year    Number of falls: 1  Injured during fall?: Yes    Feels unsteady when standing or walking?: No    Worried about falling?: No      Home Safety:  Patient does not have trouble with stairs inside or outside of their home. Patient has working smoke alarms and has no working carbon monoxide detector. Home safety hazards include: none.     Nutrition:   Current diet is Regular.     Medications:   Patient is currently taking over-the-counter supplements. OTC medications include: see medication list. Patient is able to manage medications.     Activities of Daily Living (ADLs)/Instrumental Activities of Daily Living (IADLs):   Walk and transfer into and out of bed and chair?: Yes  Dress and groom yourself?: Yes    Bathe or shower yourself?: Yes    Feed yourself? Yes  Do your laundry/housekeeping?: Yes  Manage your money, pay your bills and track your expenses?: Yes  Make your own meals?: Yes    Do your own shopping?: Yes    Previous Hospitalizations:   Any hospitalizations or ED visits  within the last 12 months?: No      Advance Care Planning:   Living will: Yes    Durable POA for healthcare: Yes    Advanced directive: Yes      PREVENTIVE SCREENINGS      Cardiovascular Screening:    General: Screening Not Indicated and History Lipid Disorder      Diabetes Screening:     General: Screening Current      Prostate Cancer Screening:    General: Screening Not Indicated      Lung Cancer Screening:     General: Screening Not Indicated    Screening, Brief Intervention, and Referral to Treatment (SBIRT)    Screening  Typical number of drinks in a day: 0  Typical number of drinks in a week: 0  Interpretation: Low risk drinking behavior.    Single Item Drug Screening:  How often have you used an illegal drug (including marijuana) or a prescription medication for non-medical reasons in the past year? never    Single Item Drug Screen Score: 0  Interpretation: Negative screen for possible drug use disorder    Other Counseling Topics:   Car/seat belt/driving safety and calcium and vitamin D intake and regular weightbearing exercise.     Social Determinants of Health     Financial Resource Strain: Low Risk  (9/20/2023)    Overall Financial Resource Strain (CARDIA)     Difficulty of Paying Living Expenses: Not hard at all   Food Insecurity: No Food Insecurity (9/23/2024)    Hunger Vital Sign     Worried About Running Out of Food in the Last Year: Never true     Ran Out of Food in the Last Year: Never true   Transportation Needs: No Transportation Needs (9/23/2024)    PRAPARE - Transportation     Lack of Transportation (Medical): No     Lack of Transportation (Non-Medical): No   Housing Stability: Low Risk  (9/23/2024)    Housing Stability Vital Sign     Unable to Pay for Housing in the Last Year: No     Number of Times Moved in the Last Year: 1     Homeless in the Last Year: No   Utilities: Not At Risk (9/23/2024)    [facility] Utilities     Threatened with loss of utilities: No     No results found.    Objective     BP  "144/80   Pulse 90   Temp 98.2 °F (36.8 °C) (Temporal)   Ht 5' 6\" (1.676 m)   Wt 80.3 kg (177 lb)   SpO2 96%   BMI 28.57 kg/m²     Physical Exam  Vitals and nursing note reviewed.   Constitutional:       General: He is not in acute distress.     Appearance: He is well-developed.   HENT:      Head: Normocephalic and atraumatic.      Mouth/Throat:      Mouth: Mucous membranes are moist.   Eyes:      Conjunctiva/sclera: Conjunctivae normal.   Cardiovascular:      Rate and Rhythm: Normal rate and regular rhythm.      Heart sounds: No murmur heard.  Pulmonary:      Effort: Pulmonary effort is normal. No respiratory distress.      Breath sounds: Normal breath sounds.   Abdominal:      General: Abdomen is flat.      Palpations: Abdomen is soft.      Tenderness: There is no abdominal tenderness.   Musculoskeletal:         General: No swelling.      Cervical back: Normal range of motion and neck supple.   Skin:     General: Skin is warm and dry.      Capillary Refill: Capillary refill takes less than 2 seconds.   Neurological:      Mental Status: He is alert.   Psychiatric:         Mood and Affect: Mood normal.         "

## 2024-09-23 NOTE — PATIENT INSTRUCTIONS
Medicare Preventive Visit Patient Instructions  Thank you for completing your Welcome to Medicare Visit or Medicare Annual Wellness Visit today. Your next wellness visit will be due in one year (9/24/2025).  The screening/preventive services that you may require over the next 5-10 years are detailed below. Some tests may not apply to you based off risk factors and/or age. Screening tests ordered at today's visit but not completed yet may show as past due. Also, please note that scanned in results may not display below.  Preventive Screenings:  Service Recommendations Previous Testing/Comments   Colorectal Cancer Screening  Colonoscopy    Fecal Occult Blood Test (FOBT)/Fecal Immunochemical Test (FIT)  Fecal DNA/Cologuard Test  Flexible Sigmoidoscopy Age: 45-75 years old   Colonoscopy: every 10 years (May be performed more frequently if at higher risk)  OR  FOBT/FIT: every 1 year  OR  Cologuard: every 3 years  OR  Sigmoidoscopy: every 5 years  Screening may be recommended earlier than age 45 if at higher risk for colorectal cancer. Also, an individualized decision between you and your healthcare provider will decide whether screening between the ages of 76-85 would be appropriate. Colonoscopy: 05/08/2015  FOBT/FIT: Not on file  Cologuard: 11/09/2021  Sigmoidoscopy: Not on file          Prostate Cancer Screening Individualized decision between patient and health care provider in men between ages of 55-69   Medicare will cover every 12 months beginning on the day after your 50th birthday PSA: No results in last 5 years     Screening Not Indicated     Hepatitis C Screening Once for adults born between 1945 and 1965  More frequently in patients at high risk for Hepatitis C Hep C Antibody: Not on file        Diabetes Screening 1-2 times per year if you're at risk for diabetes or have pre-diabetes Fasting glucose: 103 mg/dL (9/13/2024)  A1C: 6.4 % (9/13/2024)  Screening Current   Cholesterol Screening Once every 5 years if  you don't have a lipid disorder. May order more often based on risk factors. Lipid panel: 09/13/2024  Screening Not Indicated  History Lipid Disorder      Other Preventive Screenings Covered by Medicare:  Abdominal Aortic Aneurysm (AAA) Screening: covered once if your at risk. You're considered to be at risk if you have a family history of AAA or a male between the age of 65-75 who smoking at least 100 cigarettes in your lifetime.  Lung Cancer Screening: covers low dose CT scan once per year if you meet all of the following conditions: (1) Age 55-77; (2) No signs or symptoms of lung cancer; (3) Current smoker or have quit smoking within the last 15 years; (4) You have a tobacco smoking history of at least 20 pack years (packs per day x number of years you smoked); (5) You get a written order from a healthcare provider.  Glaucoma Screening: covered annually if you're considered high risk: (1) You have diabetes OR (2) Family history of glaucoma OR (3)  aged 50 and older OR (4)  American aged 65 and older  Osteoporosis Screening: covered every 2 years if you meet one of the following conditions: (1) Have a vertebral abnormality; (2) On glucocorticoid therapy for more than 3 months; (3) Have primary hyperparathyroidism; (4) On osteoporosis medications and need to assess response to drug therapy.  HIV Screening: covered annually if you're between the age of 15-65. Also covered annually if you are younger than 15 and older than 65 with risk factors for HIV infection. For pregnant patients, it is covered up to 3 times per pregnancy.    Immunizations:  Immunization Recommendations   Influenza Vaccine Annual influenza vaccination during flu season is recommended for all persons aged >= 6 months who do not have contraindications   Pneumococcal Vaccine   * Pneumococcal conjugate vaccine = PCV13 (Prevnar 13), PCV15 (Vaxneuvance), PCV20 (Prevnar 20)  * Pneumococcal polysaccharide vaccine = PPSV23  (Pneumovax) Adults 19-63 yo with certain risk factors or if 65+ yo  If never received any pneumonia vaccine: recommend Prevnar 20 (PCV20)  Give PCV20 if previously received 1 dose of PCV13 or PPSV23   Hepatitis B Vaccine 3 dose series if at intermediate or high risk (ex: diabetes, end stage renal disease, liver disease)   Respiratory syncytial virus (RSV) Vaccine - COVERED BY MEDICARE PART D  * RSVPreF3 (Arexvy) CDC recommends that adults 60 years of age and older may receive a single dose of RSV vaccine using shared clinical decision-making (SCDM)   Tetanus (Td) Vaccine - COST NOT COVERED BY MEDICARE PART B Following completion of primary series, a booster dose should be given every 10 years to maintain immunity against tetanus. Td may also be given as tetanus wound prophylaxis.   Tdap Vaccine - COST NOT COVERED BY MEDICARE PART B Recommended at least once for all adults. For pregnant patients, recommended with each pregnancy.   Shingles Vaccine (Shingrix) - COST NOT COVERED BY MEDICARE PART B  2 shot series recommended in those 19 years and older who have or will have weakened immune systems or those 50 years and older     Health Maintenance Due:      Topic Date Due   • Colorectal Cancer Screening  Discontinued     Immunizations Due:      Topic Date Due   • COVID-19 Vaccine (4 - 2023-24 season) 09/01/2024   • Influenza Vaccine (1) 09/01/2024     Advance Directives   What are advance directives?  Advance directives are legal documents that state your wishes and plans for medical care. These plans are made ahead of time in case you lose your ability to make decisions for yourself. Advance directives can apply to any medical decision, such as the treatments you want, and if you want to donate organs.   What are the types of advance directives?  There are many types of advance directives, and each state has rules about how to use them. You may choose a combination of any of the following:  Living will:  This is a  written record of the treatment you want. You can also choose which treatments you do not want, which to limit, and which to stop at a certain time. This includes surgery, medicine, IV fluid, and tube feedings.   Durable power of  for healthcare (DPAHC):  This is a written record that states who you want to make healthcare choices for you when you are unable to make them for yourself. This person, called a proxy, is usually a family member or a friend. You may choose more than 1 proxy.  Do not resuscitate (DNR) order:  A DNR order is used in case your heart stops beating or you stop breathing. It is a request not to have certain forms of treatment, such as CPR. A DNR order may be included in other types of advance directives.  Medical directive:  This covers the care that you want if you are in a coma, near death, or unable to make decisions for yourself. You can list the treatments you want for each condition. Treatment may include pain medicine, surgery, blood transfusions, dialysis, IV or tube feedings, and a ventilator (breathing machine).  Values history:  This document has questions about your views, beliefs, and how you feel and think about life. This information can help others choose the care that you would choose.  Why are advance directives important?  An advance directive helps you control your care. Although spoken wishes may be used, it is better to have your wishes written down. Spoken wishes can be misunderstood, or not followed. Treatments may be given even if you do not want them. An advance directive may make it easier for your family to make difficult choices about your care.   Fall Prevention    Fall prevention  includes ways to make your home and other areas safer. It also includes ways you can move more carefully to prevent a fall. Health conditions that cause changes in your blood pressure, vision, or muscle strength and coordination may increase your risk for falls. Medicines may  also increase your risk for falls if they make you dizzy, weak, or sleepy.   Fall prevention tips:   Stand or sit up slowly.    Use assistive devices as directed.    Wear shoes that fit well and have soles that .    Wear a personal alarm.    Stay active.    Manage your medical conditions.    Home Safety Tips:  Add items to prevent falls in the bathroom.    Keep paths clear.    Install bright lights in your home.    Keep items you use often on shelves within reach.    Paint or place reflective tape on the edges of your stairs.    Weight Management   Why it is important to manage your weight:  Being overweight increases your risk of health conditions such as heart disease, high blood pressure, type 2 diabetes, and certain types of cancer. It can also increase your risk for osteoarthritis, sleep apnea, and other respiratory problems. Aim for a slow, steady weight loss. Even a small amount of weight loss can lower your risk of health problems.  How to lose weight safely:  A safe and healthy way to lose weight is to eat fewer calories and get regular exercise. You can lose up about 1 pound a week by decreasing the number of calories you eat by 500 calories each day.   Healthy meal plan for weight management:  A healthy meal plan includes a variety of foods, contains fewer calories, and helps you stay healthy. A healthy meal plan includes the following:  Eat whole-grain foods more often.  A healthy meal plan should contain fiber. Fiber is the part of grains, fruits, and vegetables that is not broken down by your body. Whole-grain foods are healthy and provide extra fiber in your diet. Some examples of whole-grain foods are whole-wheat breads and pastas, oatmeal, brown rice, and bulgur.  Eat a variety of vegetables every day.  Include dark, leafy greens such as spinach, kale, coleen greens, and mustard greens. Eat yellow and orange vegetables such as carrots, sweet potatoes, and winter squash.   Eat a variety of  fruits every day.  Choose fresh or canned fruit (canned in its own juice or light syrup) instead of juice. Fruit juice has very little or no fiber.  Eat low-fat dairy foods.  Drink fat-free (skim) milk or 1% milk. Eat fat-free yogurt and low-fat cottage cheese. Try low-fat cheeses such as mozzarella and other reduced-fat cheeses.  Choose meat and other protein foods that are low in fat.  Choose beans or other legumes such as split peas or lentils. Choose fish, skinless poultry (chicken or turkey), or lean cuts of red meat (beef or pork). Before you cook meat or poultry, cut off any visible fat.   Use less fat and oil.  Try baking foods instead of frying them. Add less fat, such as margarine, sour cream, regular salad dressing and mayonnaise to foods. Eat fewer high-fat foods. Some examples of high-fat foods include french fries, doughnuts, ice cream, and cakes.  Eat fewer sweets.  Limit foods and drinks that are high in sugar. This includes candy, cookies, regular soda, and sweetened drinks.  Exercise:  Exercise at least 30 minutes per day on most days of the week. Some examples of exercise include walking, biking, dancing, and swimming. You can also fit in more physical activity by taking the stairs instead of the elevator or parking farther away from stores. Ask your healthcare provider about the best exercise plan for you.      © Copyright [employer] 2018 Information is for End User's use only and may not be sold, redistributed or otherwise used for commercial purposes. All illustrations and images included in CareNotes® are the copyrighted property of [employer] or [employer]

## 2024-09-24 ENCOUNTER — OFFICE VISIT (OUTPATIENT)
Dept: PHYSICAL THERAPY | Facility: REHABILITATION | Age: 84
End: 2024-09-24
Payer: MEDICARE

## 2024-09-24 DIAGNOSIS — M75.42 IMPINGEMENT SYNDROME OF LEFT SHOULDER: ICD-10-CM

## 2024-09-24 DIAGNOSIS — M75.41 IMPINGEMENT SYNDROME OF RIGHT SHOULDER: ICD-10-CM

## 2024-09-24 DIAGNOSIS — M19.011 PRIMARY OSTEOARTHRITIS OF RIGHT SHOULDER: Primary | ICD-10-CM

## 2024-09-24 DIAGNOSIS — M19.012 PRIMARY OSTEOARTHRITIS OF LEFT SHOULDER: ICD-10-CM

## 2024-09-24 PROCEDURE — 97112 NEUROMUSCULAR REEDUCATION: CPT

## 2024-09-24 PROCEDURE — 97110 THERAPEUTIC EXERCISES: CPT

## 2024-09-24 NOTE — PROGRESS NOTES
"Daily Note     Today's date: 2024  Patient name: Rayray Dunn  : 1940  MRN: [id number]  Referring provider: Anel White P*  Dx:   Encounter Diagnosis     ICD-10-CM    1. Primary osteoarthritis of right shoulder  M19.011       2. Primary osteoarthritis of left shoulder  M19.012       3. Impingement syndrome of left shoulder  M75.42       4. Impingement syndrome of right shoulder  M75.41           Start Time: 1145  Stop Time: 1230  Total time in clinic (min): 45 minutes    Subjective: Rayray reports that his shoulders are feeling a lot better then the last time he was here.       Objective: See treatment diary below      Assessment: Rayray Tolerated treatment well with appropriate muscle fatigue experienced with ex's. He is making steady gains towards goals with improved shoulder ROM. Cont to be limited with his L shoulder ER. Tolerated increased reps with ex's well today and S/L ER w/o an reports of shoulder pain. Required vc's t/o session for proper positioning with ex's. He would benefit from continued PT and compliance with HEP.    1:1 with Marilyn Delaney PTA  for entirety of treatment session.       Plan: Continue per plan of care.      Precautions: OA, HTN, hx of skin cancer  POC expires Unit limit Auth Expiration date PT/OT + Visit Limit?    NA NA BOMN         Visit/Unit Tracking  AUTH Status:  Date       RE every 10V Used 1 2 3       Remaining             Pertinent Findings:                                                                                            Test / Measure  2024   FOTO (Predicted 66) 48                       Manuals           PROM L shoulder ER+IR  LQ           PROM R shoulder IR  LQ           B/l GH post/inf JM's  STM - LQ                        Neuro Re-Ed             [place] LPD  3# 2x10 8\" 3x10           [place] rows  4# 2x10 10\" 3x10           [place] IR             Taisha ER             Ball circles against wall cw/ccw  " "                                     Ther Ex             UBE  NV 3/3          Serratus wall slides  3x5, 5\" 5\" 2x10           Supine cane flexion  3x5, 5\" 5\" 2x10           Supine cane ER L shoulder  B/L L>R 3x5, 5\" 5\" 2x10 ea           Supine scap punches  B/L 3x5, 3\" 5\" 2x10           S/L ER   5\" 2x10 ea           Prone I+T             Scaption to 90*             Pt education+ HEP instruction TP 8 mins 8 min update and review OTB issued           Ther Activity                                       Gait Training                                       Modalities                                       Access Code: [email or web address]  URL: [email or web address]  Date: 09/20/2024  Prepared by: Jocelyn Perez    Exercises  - Standing Bilateral Low Shoulder Row with Anchored Resistance  - 1 x daily - 7 x weekly - 2 sets - 10 reps  - Shoulder extension with resistance - Neutral  - 1 x daily - 7 x weekly - 2 sets - 10 reps  - Single Arm Serratus Punches in Supine with Dumbbell  - 1 x daily - 7 x weekly - 3 sets - 5 reps  - Supine Shoulder External Rotation with Dowel  - 1 x daily - 7 x weekly - 3 sets - 5 reps  - Supine Shoulder Flexion Extension AAROM with Dowel  - 1 x daily - 7 x weekly - 3 sets - 5 reps       "

## 2024-09-27 ENCOUNTER — OFFICE VISIT (OUTPATIENT)
Dept: PHYSICAL THERAPY | Facility: REHABILITATION | Age: 84
End: 2024-09-27
Payer: MEDICARE

## 2024-09-27 DIAGNOSIS — M19.012 PRIMARY OSTEOARTHRITIS OF LEFT SHOULDER: ICD-10-CM

## 2024-09-27 DIAGNOSIS — M75.41 IMPINGEMENT SYNDROME OF RIGHT SHOULDER: ICD-10-CM

## 2024-09-27 DIAGNOSIS — M19.011 PRIMARY OSTEOARTHRITIS OF RIGHT SHOULDER: Primary | ICD-10-CM

## 2024-09-27 DIAGNOSIS — M75.42 IMPINGEMENT SYNDROME OF LEFT SHOULDER: ICD-10-CM

## 2024-09-27 PROCEDURE — 97140 MANUAL THERAPY 1/> REGIONS: CPT

## 2024-09-27 PROCEDURE — 97112 NEUROMUSCULAR REEDUCATION: CPT

## 2024-09-27 PROCEDURE — 97110 THERAPEUTIC EXERCISES: CPT

## 2024-09-27 NOTE — PROGRESS NOTES
"Daily Note     Today's date: 2024  Patient name: Rayray Dunn  : 1940  MRN: [id number]  Referring provider: Anel White P*  Dx:   Encounter Diagnosis     ICD-10-CM    1. Primary osteoarthritis of right shoulder  M19.011       2. Primary osteoarthritis of left shoulder  M19.012       3. Impingement syndrome of left shoulder  M75.42       4. Impingement syndrome of right shoulder  M75.41           Start Time: 840  Stop Time: 922  Total time in clinic (min): 42 minutes    Subjective: Patient reports that he has mild ache into shoulders today that he attributes to sleeping position and inclement weather. No adverse reactions reported after previous session.       Objective: See treatment diary below      Assessment: Added 2# cuff weight to supine scapula punches and increased resistances with scapular strengthening on Taisha. Patient appeared to tolerate treatment well and without complaint. Minimal cuing required throughout session for proper posturing. He had muscular fatigue with S/L ER with 2# DB on R>L. Continued decreased ROM into IR/ER on LUE and IR on RUE. Patient demonstrated fatigue post treatment, exhibited good technique with therapeutic exercises, and would benefit from continued PT      Plan: Continue per plan of care.      Precautions: OA, HTN, hx of skin cancer  POC expires Unit limit Auth Expiration date PT/OT + Visit Limit?    NA NA BOMN         Visit/Unit Tracking  AUTH Status:  Date      RE every 10V Used 1 2 3 4      Remaining             Pertinent Findings:                                                                                            Test / Measure  2024   FOTO (Predicted 66) 48                       Manuals          PROM L shoulder ER+IR  LQ  JL         PROM R shoulder IR  LQ  JL         B/l GH post/inf JM's  STM - LQ  Glides JL                      Neuro Re-Ed             [place] LPD  3# 2x10 8\" 3x10  10# " "3x10         [place] rows  4# 2x10 10\" 3x10  12.1# 3\" 3x10         [place] IR             [place] ER             Ball circles against wall cw/ccw                                       Ther Ex             UBE  NV 3/3 L1 3'/3'         Serratus wall slides  3x5, 5\" 5\" 2x10  5\" 2x10         Supine cane flexion  3x5, 5\" 5\" 2x10  5\" 2x10         Supine cane ER L shoulder  B/L L>R 3x5, 5\" 5\" 2x10 ea  5\" 2x10 ea         Supine scap punches  B/L 3x5, 3\" 5\" 2x10  2# 5' 2x10         S/L ER   5\" 2x10 ea  L: 2# 5\" 2x10   R: 2# x15 0# x5         Prone I+T             Scaption to 90*             Pt education+ HEP instruction TP 8 mins 8 min update and review OTB issued           Ther Activity                                       Gait Training                                       Modalities                                       Access Code: [email or web address]  URL: [email or web address]  Date: 09/20/2024  Prepared by: Jocelyn Perez    Exercises  - Standing Bilateral Low Shoulder Row with Anchored Resistance  - 1 x daily - 7 x weekly - 2 sets - 10 reps  - Shoulder extension with resistance - Neutral  - 1 x daily - 7 x weekly - 2 sets - 10 reps  - Single Arm Serratus Punches in Supine with Dumbbell  - 1 x daily - 7 x weekly - 3 sets - 5 reps  - Supine Shoulder External Rotation with Dowel  - 1 x daily - 7 x weekly - 3 sets - 5 reps  - Supine Shoulder Flexion Extension AAROM with Dowel  - 1 x daily - 7 x weekly - 3 sets - 5 reps         "

## 2024-10-01 ENCOUNTER — OFFICE VISIT (OUTPATIENT)
Dept: PHYSICAL THERAPY | Facility: REHABILITATION | Age: 84
End: 2024-10-01
Payer: MEDICARE

## 2024-10-01 DIAGNOSIS — M75.41 IMPINGEMENT SYNDROME OF RIGHT SHOULDER: ICD-10-CM

## 2024-10-01 DIAGNOSIS — M19.011 PRIMARY OSTEOARTHRITIS OF RIGHT SHOULDER: Primary | ICD-10-CM

## 2024-10-01 DIAGNOSIS — M19.012 PRIMARY OSTEOARTHRITIS OF LEFT SHOULDER: ICD-10-CM

## 2024-10-01 DIAGNOSIS — M75.42 IMPINGEMENT SYNDROME OF LEFT SHOULDER: ICD-10-CM

## 2024-10-01 PROCEDURE — 97140 MANUAL THERAPY 1/> REGIONS: CPT | Performed by: PHYSICAL THERAPIST

## 2024-10-01 PROCEDURE — 97112 NEUROMUSCULAR REEDUCATION: CPT | Performed by: PHYSICAL THERAPIST

## 2024-10-01 PROCEDURE — 97110 THERAPEUTIC EXERCISES: CPT | Performed by: PHYSICAL THERAPIST

## 2024-10-01 NOTE — PROGRESS NOTES
"Daily Note     Today's date: 10/1/2024  Patient name: Rayray Dunn  : 1940  MRN: [id number]  Referring provider: Anel White P*  Dx:   Encounter Diagnosis     ICD-10-CM    1. Primary osteoarthritis of right shoulder  M19.011       2. Primary osteoarthritis of left shoulder  M19.012       3. Impingement syndrome of left shoulder  M75.42       4. Impingement syndrome of right shoulder  M75.41                      Subjective: Pt reports b/l shoulder achiness today, but located superior aspect of shoulders.  Pt notes feeling this after mowing the grass yesterday.        Objective: See treatment diary below      Assessment: Tolerated treatment and progressions well. VC's required for exercise technique. Patient would benefit from continued PT.  Pt demonstrating improved shoulder mobility with manuals and notes less pain post session.        Plan: Continue per plan of care.      Precautions: OA, HTN, hx of skin cancer  POC expires Unit limit Auth Expiration date PT/OT + Visit Limit?    NA NA BOMN         Visit/Unit Tracking  AUTH Status:  Date 9/16 9/20 9/24 9/27 10/1    RE every 10V Used 1 2 3 4 5     Remaining             Pertinent Findings:                                                                                            Test / Measure  2024   FOTO (Predicted 66) 48                       Manuals 9/16 9/20 9/24 9/27 10/1        PROM L shoulder ER+IR  LQ  JL TP        PROM R shoulder IR  LQ  JL TP        B/l GH post/inf JM's  STM - LQ  Glides JL TP gr III                     Neuro Re-Ed             [place] LPD  3# 2x10 8\" 3x10  10# 3x10 10# 3x10        [place] rows  4# 2x10 10\" 3x10  12.1# 3\" 3x10 12# 3\" 3x10        [place] IR     3# 2x10 ea        [place] ER     2# 2x10 ea        Ball circles against wall cw/ccw     Rmb x15 ea                                  Ther Ex             UBE  NV 3/3 L1 3'/3' 90rpm 3'/3'        Serratus wall slides  3x5, 5\" 5\" 2x10  5\" 2x10 5\"x10      " "  Supine cane flexion  3x5, 5\" 5\" 2x10  5\" 2x10 10\"x10        Supine cane ER L shoulder  B/L L>R 3x5, 5\" 5\" 2x10 ea  5\" 2x10 ea 10\"x10        Supine scap punches  B/L 3x5, 3\" 5\" 2x10  2# 5' 2x10 2# 5\" 2x10        S/L ER   5\" 2x10 ea  L: 2# 5\" 2x10   R: 2# x15 0# x5 2# 2x10 ea        Prone I+T             Scaption to 90*     2x10        Pt education+ HEP instruction TP 8 mins 8 min update and review OTB issued           Ther Activity                                       Gait Training                                       Modalities                                       Access Code: [email or web address]  URL: [email or web address]  Date: 09/20/2024  Prepared by: Jocelyn Perez    Exercises  - Standing Bilateral Low Shoulder Row with Anchored Resistance  - 1 x daily - 7 x weekly - 2 sets - 10 reps  - Shoulder extension with resistance - Neutral  - 1 x daily - 7 x weekly - 2 sets - 10 reps  - Single Arm Serratus Punches in Supine with Dumbbell  - 1 x daily - 7 x weekly - 3 sets - 5 reps  - Supine Shoulder External Rotation with Dowel  - 1 x daily - 7 x weekly - 3 sets - 5 reps  - Supine Shoulder Flexion Extension AAROM with Dowel  - 1 x daily - 7 x weekly - 3 sets - 5 reps           "

## 2024-10-04 ENCOUNTER — OFFICE VISIT (OUTPATIENT)
Dept: PHYSICAL THERAPY | Facility: REHABILITATION | Age: 84
End: 2024-10-04
Payer: MEDICARE

## 2024-10-04 DIAGNOSIS — M75.41 IMPINGEMENT SYNDROME OF RIGHT SHOULDER: ICD-10-CM

## 2024-10-04 DIAGNOSIS — M19.012 PRIMARY OSTEOARTHRITIS OF LEFT SHOULDER: ICD-10-CM

## 2024-10-04 DIAGNOSIS — M19.011 PRIMARY OSTEOARTHRITIS OF RIGHT SHOULDER: Primary | ICD-10-CM

## 2024-10-04 DIAGNOSIS — M75.42 IMPINGEMENT SYNDROME OF LEFT SHOULDER: ICD-10-CM

## 2024-10-04 PROCEDURE — 97110 THERAPEUTIC EXERCISES: CPT

## 2024-10-04 PROCEDURE — 97112 NEUROMUSCULAR REEDUCATION: CPT

## 2024-10-04 NOTE — PROGRESS NOTES
"Daily Note     Today's date: 10/4/2024  Patient name: Rayray Dunn  : 1940  MRN: [id number]  Referring provider: Anel White P*  Dx:   Encounter Diagnosis     ICD-10-CM    1. Primary osteoarthritis of right shoulder  M19.011       2. Primary osteoarthritis of left shoulder  M19.012       3. Impingement syndrome of left shoulder  M75.42       4. Impingement syndrome of right shoulder  M75.41           Start Time: 0945  Stop Time: 1040  Total time in clinic (min): 55 minutes    Subjective: Rayray reports more UT soreness today, stating he has been sore all week.       Objective: See treatment diary below      Assessment: Rayray Tolerated treatment well with appropriate muscle fatigue experienced with ex's. He is making steady gains towards goals. Experienced increased soreness with resistive IR/ER on L UE at [place]. Demonstrates improved ER on B/L shoulders, limitation on L shoulder in IR persists.  Required vc's t/o session for proper positioning with ex's. He would benefit from continued PT and compliance with HEP.    1:1 with Marilyn Delaney PTA  for entirety of treatment session.       Plan: Continue per plan of care.      Precautions: OA, HTN, hx of skin cancer  POC expires Unit limit Auth Expiration date PT/OT + Visit Limit?    NA NA BOMN         Visit/Unit Tracking  AUTH Status:  Date 9/16 9/20 9/24 9/27 10/1 10/4   RE every 10V Used 1 2 3 4 5 6    Remaining             Pertinent Findings:                                                                                            Test / Measure  2024   FOTO (Predicted 66) 48                       Manuals 9/16 9/20 9/24 9/27 10/1 1/4       PROM L shoulder ER+IR  LQ  JL TP AW       PROM R shoulder IR  LQ  JL TP AW       B/l GH post/inf JM's  STM - LQ  Glides JL TP gr III STM UT AW                     Neuro Re-Ed             [place] LPD  3# 2x10 8\" 3x10  10# 3x10 10# 3x10 10#  3x10        [place] rows  4# 2x10 10\" 3x10  12.1# 3\" 3x10 " "12# 3\" 3x10 12# 3\" 3x10        [place] IR     3# 2x10 ea 2#-L  3#-R 2x10 ea        [place] ER     2# 2x10 ea 2# 2x10 ea        Ball circles against wall cw/ccw     Rmb x15 ea RMB 15x ea                                  Ther Ex             UBE  NV 3/3 L1 3'/3' 90rpm 3'/3' 3'/3'       Serratus wall slides  3x5, 5\" 5\" 2x10  5\" 2x10 5\"x10 5\"x10        Supine cane flexion  3x5, 5\" 5\" 2x10  5\" 2x10 10\"x10 10\"x 10        Supine cane ER L shoulder  B/L L>R 3x5, 5\" 5\" 2x10 ea  5\" 2x10 ea 10\"x10 10\"x 10        Supine scap punches  B/L 3x5, 3\" 5\" 2x10  2# 5' 2x10 2# 5\" 2x10 2# 5\" 2x10        S/L ER   5\" 2x10 ea  L: 2# 5\" 2x10   R: 2# x15 0# x5 2# 2x10 ea 2# 2x10 ea        Prone I+T             Scaption to 90*     2x10 2x10        Pt education+ HEP instruction TP 8 mins 8 min update and review OTB issued           Ther Activity                                       Gait Training                                       Modalities                                       Access Code: [email or web address]  URL: [email or web address]  Date: 09/20/2024  Prepared by: Jocelyn Perez    Exercises  - Standing Bilateral Low Shoulder Row with Anchored Resistance  - 1 x daily - 7 x weekly - 2 sets - 10 reps  - Shoulder extension with resistance - Neutral  - 1 x daily - 7 x weekly - 2 sets - 10 reps  - Single Arm Serratus Punches in Supine with Dumbbell  - 1 x daily - 7 x weekly - 3 sets - 5 reps  - Supine Shoulder External Rotation with Dowel  - 1 x daily - 7 x weekly - 3 sets - 5 reps  - Supine Shoulder Flexion Extension AAROM with Dowel  - 1 x daily - 7 x weekly - 3 sets - 5 reps             "

## 2024-10-08 ENCOUNTER — OFFICE VISIT (OUTPATIENT)
Dept: PHYSICAL THERAPY | Facility: REHABILITATION | Age: 84
End: 2024-10-08
Payer: MEDICARE

## 2024-10-08 DIAGNOSIS — M19.011 PRIMARY OSTEOARTHRITIS OF RIGHT SHOULDER: Primary | ICD-10-CM

## 2024-10-08 DIAGNOSIS — M75.42 IMPINGEMENT SYNDROME OF LEFT SHOULDER: ICD-10-CM

## 2024-10-08 DIAGNOSIS — M75.41 IMPINGEMENT SYNDROME OF RIGHT SHOULDER: ICD-10-CM

## 2024-10-08 DIAGNOSIS — M19.012 PRIMARY OSTEOARTHRITIS OF LEFT SHOULDER: ICD-10-CM

## 2024-10-08 PROCEDURE — 97112 NEUROMUSCULAR REEDUCATION: CPT | Performed by: PHYSICAL THERAPIST

## 2024-10-08 PROCEDURE — 97110 THERAPEUTIC EXERCISES: CPT | Performed by: PHYSICAL THERAPIST

## 2024-10-08 PROCEDURE — 97140 MANUAL THERAPY 1/> REGIONS: CPT | Performed by: PHYSICAL THERAPIST

## 2024-10-08 NOTE — PROGRESS NOTES
"Daily Note     Today's date: 10/8/2024  Patient name: Rayray Dunn  : 1940  MRN: [id number]  Referring provider: Anel White P*  Dx:   Encounter Diagnosis     ICD-10-CM    1. Primary osteoarthritis of right shoulder  M19.011       2. Primary osteoarthritis of left shoulder  M19.012       3. Impingement syndrome of left shoulder  M75.42       4. Impingement syndrome of right shoulder  M75.41                   Subjective: Pt reports improved shoulder pain this week.        Objective: See treatment diary below      Assessment: Tolerated treatment well. Pt remains appropriately challenged with current POC.  Improved shoulder mobility noted with manuals, although some IR tightness persists b/l.  Patient would benefit from continued PT      Plan: Continue per plan of care.  Progress treatment as tolerated.       Precautions: OA, HTN, hx of skin cancer  POC expires Unit limit Auth Expiration date PT/OT + Visit Limit?    NA NA BOMN         Visit/Unit Tracking  AUTH Status:  Date 9/16 9/20 9/24 9/27 10/1 10/4 10/8   RE every 10V Used 1 2 3 4 5 6 7    Remaining              Pertinent Findings:                                                                                            Test / Measure  2024   FOTO (Predicted 66) 48                       Manuals 9/16 9/20 9/24 9/27 10/1 1/4 10/8      PROM L shoulder ER+IR  LQ  JL TP AW       PROM R shoulder IR  LQ  JL TP AW B/l IR TP      B/l GH post/inf JM's  STM - LQ  Glides JL TP gr III STM UT AW  TP gr III                   Neuro Re-Ed             [place] LPD  3# 2x10 8\" 3x10  10# 3x10 10# 3x10 10#  3x10  10# 3x10      [place] rows  4# 2x10 10\" 3x10  12.1# 3\" 3x10 12# 3\" 3x10 12# 3\" 3x10  12# 3\" 3x10      [place] IR     3# 2x10 ea 2#-L  3#-R 2x10 ea  3# 2x10 ea      [place] ER     2# 2x10 ea 2# 2x10 ea  2# 2x10 ea      Ball circles against wall cw/ccw     Rmb x15 ea RMB 15x ea  Rmb x15 ea                                Ther Ex             UBE  NV " "3/3 L1 3'/3' 90rpm 3'/3' 3'/3' 3'/3' 85rpm      Serratus wall slides  3x5, 5\" 5\" 2x10  5\" 2x10 5\"x10 5\"x10  5\"x15      Supine cane flexion  3x5, 5\" 5\" 2x10  5\" 2x10 10\"x10 10\"x 10  10\"x10      Supine cane ER L shoulder  B/L L>R 3x5, 5\" 5\" 2x10 ea  5\" 2x10 ea 10\"x10 10\"x 10  D/c      Supine scap punches  B/L 3x5, 3\" 5\" 2x10  2# 5' 2x10 2# 5\" 2x10 2# 5\" 2x10  2# 5\" 2x10      S/L ER   5\" 2x10 ea  L: 2# 5\" 2x10   R: 2# x15 0# x5 2# 2x10 ea 2# 2x10 ea  2# 2x10 ea      Prone I+T             Scaption to 90*     2x10 2x10  2x10      Pt education+ HEP instruction TP 8 mins 8 min update and review OTB issued           Ther Activity                                       Gait Training                                       Modalities                                       Access Code: [email or web address]  URL: [email or web address]  Date: 09/20/2024  Prepared by: Jocelyn Perez    Exercises  - Standing Bilateral Low Shoulder Row with Anchored Resistance  - 1 x daily - 7 x weekly - 2 sets - 10 reps  - Shoulder extension with resistance - Neutral  - 1 x daily - 7 x weekly - 2 sets - 10 reps  - Single Arm Serratus Punches in Supine with Dumbbell  - 1 x daily - 7 x weekly - 3 sets - 5 reps  - Supine Shoulder External Rotation with Dowel  - 1 x daily - 7 x weekly - 3 sets - 5 reps  - Supine Shoulder Flexion Extension AAROM with Dowel  - 1 x daily - 7 x weekly - 3 sets - 5 reps               "

## 2024-10-10 ENCOUNTER — OFFICE VISIT (OUTPATIENT)
Dept: PHYSICAL THERAPY | Facility: REHABILITATION | Age: 84
End: 2024-10-10
Payer: MEDICARE

## 2024-10-10 DIAGNOSIS — M75.41 IMPINGEMENT SYNDROME OF RIGHT SHOULDER: ICD-10-CM

## 2024-10-10 DIAGNOSIS — M19.012 PRIMARY OSTEOARTHRITIS OF LEFT SHOULDER: ICD-10-CM

## 2024-10-10 DIAGNOSIS — M75.42 IMPINGEMENT SYNDROME OF LEFT SHOULDER: ICD-10-CM

## 2024-10-10 DIAGNOSIS — M19.011 PRIMARY OSTEOARTHRITIS OF RIGHT SHOULDER: Primary | ICD-10-CM

## 2024-10-10 PROCEDURE — 97140 MANUAL THERAPY 1/> REGIONS: CPT | Performed by: PHYSICAL THERAPIST

## 2024-10-10 PROCEDURE — 97112 NEUROMUSCULAR REEDUCATION: CPT | Performed by: PHYSICAL THERAPIST

## 2024-10-10 NOTE — PROGRESS NOTES
"Daily Note     Today's date: 10/10/2024  Patient name: Rayray Dunn  : 1940  MRN: [id number]  Referring provider: Anel White P*  Dx:   Encounter Diagnosis     ICD-10-CM    1. Primary osteoarthritis of right shoulder  M19.011       2. Primary osteoarthritis of left shoulder  M19.012       3. Impingement syndrome of left shoulder  M75.42       4. Impingement syndrome of right shoulder  M75.41               1on1 [id number] and performed remaining as part of IEP.         Subjective: Pt offers no new complaints.       Objective: See treatment diary below      Assessment: Tolerated treatment well. Patient exhibited good technique with therapeutic exercises and would benefit from continued PT.  Pt continues to have some difficulty/fatigue with ER strengthening and scaption.       Plan:  RA next week.      Precautions: OA, HTN, hx of skin cancer  POC expires Unit limit Auth Expiration date PT/OT + Visit Limit?    NA NA BOMN         Visit/Unit Tracking  AUTH Status:  Date 9/16 9/20 9/24 9/27 10/1 10/4 10/8 10/10   RE every 10V Used 1 2 3 4 5 6 7 8    Remaining               Pertinent Findings:                                                                                            Test / Measure  2024   FOTO (Predicted 66) 48                       Manuals 9/16 9/20 9/24 9/27 10/1 1/4 10/8 10/10     PROM L shoulder ER+IR  LQ  JL TP AW       PROM R shoulder IR  LQ  JL TP AW B/l IR TP B/l IR TP     B/l GH post/inf JM's  STM - LQ  Glides JL TP gr III STM UT AW  TP gr III TP gr III                  Neuro Re-Ed             [place] LPD  3# 2x10 8\" 3x10  10# 3x10 10# 3x10 10#  3x10  10# 3x10 10# 3x10     [place] rows  4# 2x10 10\" 3x10  12.1# 3\" 3x10 12# 3\" 3x10 12# 3\" 3x10  12# 3\" 3x10 13# 3\" 3x10     [place] IR     3# 2x10 ea 2#-L  3#-R 2x10 ea  3# 2x10 ea 3# 2x10 ea     [place] ER     2# 2x10 ea 2# 2x10 ea  2# 2x10 ea 2# 2x10 ea     Ball circles against wall cw/ccw     Rmb x15 ea RMB 15x ea  Rmb x15 " "ea Rmb x15 ea                               Ther Ex             UBE  NV 3/3 L1 3'/3' 90rpm 3'/3' 3'/3' 3'/3' 85rpm 3'/3' 85rpm      Serratus wall slides  3x5, 5\" 5\" 2x10  5\" 2x10 5\"x10 5\"x10  5\"x15 5\"x15     Supine cane flexion  3x5, 5\" 5\" 2x10  5\" 2x10 10\"x10 10\"x 10  10\"x10 2# 10\"x10     Supine cane ER L shoulder  B/L L>R 3x5, 5\" 5\" 2x10 ea  5\" 2x10 ea 10\"x10 10\"x 10  D/c      Supine scap punches  B/L 3x5, 3\" 5\" 2x10  2# 5' 2x10 2# 5\" 2x10 2# 5\" 2x10  2# 5\" 2x10 2# 5\" 2x10     S/L ER   5\" 2x10 ea  L: 2# 5\" 2x10   R: 2# x15 0# x5 2# 2x10 ea 2# 2x10 ea  2# 2x10 ea 2# 2x10 ea     Prone I+T             Scaption to 90*     2x10 2x10  2x10 2x10     Pt education+ HEP instruction TP 8 mins 8 min update and review OTB issued           Ther Activity                                       Gait Training                                       Modalities                                       Access Code: [email or web address]  URL: [email or web address]  Date: 09/20/2024  Prepared by: Jocelyn Perez    Exercises  - Standing Bilateral Low Shoulder Row with Anchored Resistance  - 1 x daily - 7 x weekly - 2 sets - 10 reps  - Shoulder extension with resistance - Neutral  - 1 x daily - 7 x weekly - 2 sets - 10 reps  - Single Arm Serratus Punches in Supine with Dumbbell  - 1 x daily - 7 x weekly - 3 sets - 5 reps  - Supine Shoulder External Rotation with Dowel  - 1 x daily - 7 x weekly - 3 sets - 5 reps  - Supine Shoulder Flexion Extension AAROM with Dowel  - 1 x daily - 7 x weekly - 3 sets - 5 reps                 "

## 2024-10-15 ENCOUNTER — OFFICE VISIT (OUTPATIENT)
Dept: PHYSICAL THERAPY | Facility: REHABILITATION | Age: 84
End: 2024-10-15
Payer: MEDICARE

## 2024-10-15 DIAGNOSIS — M19.012 PRIMARY OSTEOARTHRITIS OF LEFT SHOULDER: ICD-10-CM

## 2024-10-15 DIAGNOSIS — M75.42 IMPINGEMENT SYNDROME OF LEFT SHOULDER: ICD-10-CM

## 2024-10-15 DIAGNOSIS — M19.011 PRIMARY OSTEOARTHRITIS OF RIGHT SHOULDER: Primary | ICD-10-CM

## 2024-10-15 DIAGNOSIS — M75.41 IMPINGEMENT SYNDROME OF RIGHT SHOULDER: ICD-10-CM

## 2024-10-15 PROCEDURE — 97112 NEUROMUSCULAR REEDUCATION: CPT

## 2024-10-15 PROCEDURE — 97110 THERAPEUTIC EXERCISES: CPT

## 2024-10-15 NOTE — PROGRESS NOTES
"Daily Note     Today's date: 10/15/2024  Patient name: Rayray Dunn  : 1940  MRN: [id number]  Referring provider: Anel White P*  Dx:   Encounter Diagnosis     ICD-10-CM    1. Primary osteoarthritis of right shoulder  M19.011       2. Primary osteoarthritis of left shoulder  M19.012       3. Impingement syndrome of right shoulder  M75.41       4. Impingement syndrome of left shoulder  M75.42           Start Time: 1115  Stop Time: 1200  Total time in clinic (min): 45 minutes    Subjective: \"I feel alright. It bothers me when I'm mowing the lawn. Other than that I feel pretty good. I feel like I might just have to live with it.\"       Objective: See treatment diary below      Assessment: Tolerated treatment well. Patient exhibited good technique with therapeutic exercises and would benefit from continued PT.  Focused on serratus/RTC strengthening and progressed WB interventions. Tolerant of all with proper form and without sx increase. Pain and difficulty maintaining elbow ext present with LPD, sx reduction and improvements in form noted with decrease in weight. High fatigue present with ER strengthening.      Plan: RE NV,     Precautions: OA, HTN, hx of skin cancer  POC expires Unit limit Auth Expiration date PT/OT + Visit Limit?    NA NA BOMN         Visit/Unit Tracking  AUTH Status:  Date 9/16 9/20 9/24 9/27 10/1 10/4 10/8 10/10 10/15 RE   RE every 10V Used 1 2 3 4 5 6 7 8 9     Remaining                 Pertinent Findings:                                                                                            Test / Measure  2024   FOTO (Predicted 66) 48                       Manuals 10/1 1/4 10/8 10/10 10/15    PROM L shoulder ER+IR TP AW       PROM R shoulder IR TP AW B/l IR TP B/l IR TP B/l IR CM    B/l GH post/inf JM's TP gr III STM UT AW  TP gr III TP gr III CM gr III             Neuro Re-Ed         [place] LPD 10# 3x10 10#  3x10  10# 3x10 10# 3x10 8# 3x10    [place] rows 12# " "3\" 3x10 12# 3\" 3x10  12# 3\" 3x10 13# 3\" 3x10 13# 3\" 3x12    [place] IR 3# 2x10 ea 2#-L  3#-R 2x10 ea  3# 2x10 ea 3# 2x10 ea 3# 2x10 ea    [place] ER 2# 2x10 ea 2# 2x10 ea  2# 2x10 ea 2# 2x10 ea 2.5# 2x10 ea    Ball circles against wall cw/ccw Rmb x15 ea RMB 15x ea  Rmb x15 ea Rmb x15 ea Rmb x20 ea    Shoulder taps leaning against wall     2x10 B                      Ther Ex         UBE 90rpm 3'/3' 3'/3' 3'/3' 85rpm 3'/3' 85rpm  3'/3' L3    Serratus wall slides 5\"x10 5\"x10  5\"x15 5\"x15 5\"x15    Supine cane flexion 10\"x10 10\"x 10  10\"x10 2# 10\"x10 2# 10\"x10    Supine cane ER L shoulder 10\"x10 10\"x 10  D/c      Supine scap punches 2# 5\" 2x10 2# 5\" 2x10  2# 5\" 2x10 2# 5\" 2x10 2# 5\" 2x10    S/L ER 2# 2x10 ea 2# 2x10 ea  2# 2x10 ea 2# 2x10 ea 2# 2x10 ea    Prone I+T         Scaption to 90* 2x10 2x10  2x10 2x10     Pt education+ HEP instruction         Ther Activity                           Gait Training                           Modalities                           Access Code: [email or web address]  URL: [email or web address]  Date: 09/20/2024  Prepared by: Jocelyn Perez    Exercises  - Standing Bilateral Low Shoulder Row with Anchored Resistance  - 1 x daily - 7 x weekly - 2 sets - 10 reps  - Shoulder extension with resistance - Neutral  - 1 x daily - 7 x weekly - 2 sets - 10 reps  - Single Arm Serratus Punches in Supine with Dumbbell  - 1 x daily - 7 x weekly - 3 sets - 5 reps  - Supine Shoulder External Rotation with Dowel  - 1 x daily - 7 x weekly - 3 sets - 5 reps  - Supine Shoulder Flexion Extension AAROM with Dowel  - 1 x daily - 7 x weekly - 3 sets - 5 reps                 "

## 2024-10-18 ENCOUNTER — EVALUATION (OUTPATIENT)
Dept: PHYSICAL THERAPY | Facility: REHABILITATION | Age: 84
End: 2024-10-18
Payer: MEDICARE

## 2024-10-18 DIAGNOSIS — M75.41 IMPINGEMENT SYNDROME OF RIGHT SHOULDER: ICD-10-CM

## 2024-10-18 DIAGNOSIS — M75.42 IMPINGEMENT SYNDROME OF LEFT SHOULDER: ICD-10-CM

## 2024-10-18 DIAGNOSIS — M19.011 PRIMARY OSTEOARTHRITIS OF RIGHT SHOULDER: Primary | ICD-10-CM

## 2024-10-18 DIAGNOSIS — M19.012 PRIMARY OSTEOARTHRITIS OF LEFT SHOULDER: ICD-10-CM

## 2024-10-18 PROCEDURE — 97110 THERAPEUTIC EXERCISES: CPT | Performed by: PHYSICAL THERAPIST

## 2024-10-18 NOTE — PROGRESS NOTES
PT Re-Evaluation     Today's date: 10/18/2024  Patient name: Rayray Dunn  : 1940  MRN: [id number]  Referring provider: Anel White P*  Dx:   Encounter Diagnosis     ICD-10-CM    1. Primary osteoarthritis of right shoulder  M19.011       2. Primary osteoarthritis of left shoulder  M19.012       3. Impingement syndrome of right shoulder  M75.41       4. Impingement syndrome of left shoulder  M75.42            Updated measurements and functional status taken this session.   10n1 [id number] and performed remaining as part of IEP.            Assessment  Impairments: abnormal or restricted ROM, impaired physical strength and pain with function    Assessment details: Pt has demonstrated improvement in shoulder ROM, strength, and function with a decrease in pain since starting therapy.  Pt reports compliance with HEP.  Updated HEP was reviewed and issued to pt and pt demonstrates an understanding.  Pt will be d/c to ongoing HEP at this time.    Understanding of Dx/Px/POC: good     Prognosis: good    Goals  Short Term:  Pt will report decreased levels of pain by at least 2 subjective ratings in 4 weeks-met  Pt will demonstrate improved ROM by at least 10 degrees in 4 weeks-partially met  Pt will demonstrate improved strength by 1/2 grade MMT in 4 weeks-met  Long Term:   Pt will be independent in their HEP in 8 weeks-met  Pt will demonstrate improved FOTO, > predicted level-met  Pt will be independent with all ADL's-met         Plan  Patient would benefit from: skilled physical therapy    Planned therapy interventions: home exercise program    Treatment plan discussed with: patient  Plan details: Patient was educated in HEP and Plan of Care.  All questions were answered to pt's satisfaction.          Subjective Evaluation    History of Present Illness  Mechanism of injury: Pt reports overall improvement, noting decreased pain/difficulty with reaching behind back, donning belt, quick movements, lifting  things, reaching across body to wash self, driving and carrying keyboard equipment.  Pt reports pain/difficulty with lifting thing with Ue's extended.  Pt also notes an increase in L shoulder crepitus.    Patient Goals  Patient goals for therapy: decreased pain, increased motion, increased strength, independence with ADLs/IADLs and return to sport/leisure activities    Pain  At best pain ratin  At worst pain ratin  Location: L shoulder    Treatments  Previous treatment: injection treatment  Current treatment: physical therapy      Objective     Active Range of Motion   Left Shoulder   Flexion: 157 degrees   Abduction: WFL  Internal rotation BTB: sacrum     Right Shoulder   Flexion: 160 degrees   Abduction: WFL  Internal rotation BTB: L1     Passive Range of Motion   Left Shoulder   External rotation 90°: WFL  Internal rotation 90°: WFL    Right Shoulder   Internal rotation 90°: WFL    Strength/Myotome Testing     Left Shoulder     Planes of Motion   Flexion: 5   Abduction: 5   External rotation at 0°: 4   Internal rotation at 0°: 4     Right Shoulder     Planes of Motion   Flexion: 4+   Abduction: 5   External rotation at 0°: 4   Internal rotation at 0°: 5              Precautions: OA, HTN, hx of skin cancer  POC expires Unit limit Auth Expiration date PT/OT + Visit Limit?    NA NA BOMN             Visit/Unit Tracking  AUTH Status:  Date 9/16 9/20 9/24 9/27 10/1 10/4 10/8 10/10 10/15 RE 10/18   RE every 10V Used 1 2 3 4 5 6 7 8 9 10     Remaining                           Pertinent Findings:                                                                                            Test / Measure  2024 10/18   FOTO (Predicted 66) 48 70                                    Manuals 10/1 1/4 10/8 10/10 10/15  10/18    PROM L shoulder ER+IR TP AW           PROM R shoulder IR TP AW B/l IR TP B/l IR TP B/l IR CM     B/l GH post/inf JM's TP gr III STM UT AW  TP gr III TP gr III CM gr III                    "  Neuro Re-Ed               Points LPD 10# 3x10 10#  3x10  10# 3x10 10# 3x10 8# 3x10  reviewed   Taisha rows 12# 3\" 3x10 12# 3\" 3x10  12# 3\" 3x10 13# 3\" 3x10 13# 3\" 3x12  reviewed   Points IR 3# 2x10 ea 2#-L  3#-R 2x10 ea  3# 2x10 ea 3# 2x10 ea 3# 2x10 ea  reviewed   Points ER 2# 2x10 ea 2# 2x10 ea  2# 2x10 ea 2# 2x10 ea 2.5# 2x10 ea  reviewed   Ball circles against wall cw/ccw Rmb x15 ea RMB 15x ea  Rmb x15 ea Rmb x15 ea Rmb x20 ea     Shoulder taps leaning against wall         2x10 B  reviewed                                   Ther Ex               UBE 90rpm 3'/3' 3'/3' 3'/3' 85rpm 3'/3' 85rpm  3'/3' L3  3/3' 85 rpm   Serratus wall slides 5\"x10 5\"x10  5\"x15 5\"x15 5\"x15  reviewed   Supine cane flexion 10\"x10 10\"x 10  10\"x10 2# 10\"x10 2# 10\"x10  reviewed   Supine cane ER L shoulder 10\"x10 10\"x 10  D/c         Supine scap punches 2# 5\" 2x10 2# 5\" 2x10  2# 5\" 2x10 2# 5\" 2x10 2# 5\" 2x10  reviewed   S/L ER 2# 2x10 ea 2# 2x10 ea  2# 2x10 ea 2# 2x10 ea 2# 2x10 ea  reviewed   Prone I+T               Scaption to 90* 2x10 2x10  2x10 2x10    reviewed   Re-assessment      TP   Pt education+ HEP instruction            TP   Ther Activity                                               Gait Training                                               Modalities                                                       "

## 2024-10-23 DIAGNOSIS — I10 ESSENTIAL HYPERTENSION: ICD-10-CM

## 2024-10-23 RX ORDER — AMLODIPINE BESYLATE 5 MG/1
5 TABLET ORAL DAILY
Qty: 90 TABLET | Refills: 1 | Status: SHIPPED | OUTPATIENT
Start: 2024-10-23

## 2024-11-04 ENCOUNTER — CLINICAL SUPPORT (OUTPATIENT)
Dept: INTERNAL MEDICINE CLINIC | Facility: CLINIC | Age: 84
End: 2024-11-04
Payer: MEDICARE

## 2024-11-04 DIAGNOSIS — Z23 ENCOUNTER FOR IMMUNIZATION: Primary | ICD-10-CM

## 2024-11-04 PROCEDURE — 90662 IIV NO PRSV INCREASED AG IM: CPT

## 2024-11-04 PROCEDURE — G0008 ADMIN INFLUENZA VIRUS VAC: HCPCS

## 2024-11-27 ENCOUNTER — OFFICE VISIT (OUTPATIENT)
Dept: INTERNAL MEDICINE CLINIC | Facility: CLINIC | Age: 84
End: 2024-11-27
Payer: MEDICARE

## 2024-11-27 VITALS
WEIGHT: 179 LBS | TEMPERATURE: 98.4 F | HEART RATE: 90 BPM | BODY MASS INDEX: 28.77 KG/M2 | OXYGEN SATURATION: 98 % | DIASTOLIC BLOOD PRESSURE: 74 MMHG | HEIGHT: 66 IN | SYSTOLIC BLOOD PRESSURE: 138 MMHG

## 2024-11-27 DIAGNOSIS — M43.06 LUMBAR SPONDYLOLYSIS: ICD-10-CM

## 2024-11-27 DIAGNOSIS — R00.0 TACHYCARDIA: Primary | ICD-10-CM

## 2024-11-27 DIAGNOSIS — R73.01 IMPAIRED FASTING BLOOD SUGAR: ICD-10-CM

## 2024-11-27 DIAGNOSIS — I10 ESSENTIAL HYPERTENSION: ICD-10-CM

## 2024-11-27 DIAGNOSIS — E03.8 SUBCLINICAL HYPOTHYROIDISM: ICD-10-CM

## 2024-11-27 PROCEDURE — 93000 ELECTROCARDIOGRAM COMPLETE: CPT | Performed by: INTERNAL MEDICINE

## 2024-11-27 PROCEDURE — 99214 OFFICE O/P EST MOD 30 MIN: CPT | Performed by: INTERNAL MEDICINE

## 2024-11-27 NOTE — PROGRESS NOTES
Assessment/Plan:           1. Tachycardia  Comments:  Heart rate still averaging around 90 at home.  Will continue to monitor.  Orders:  -     POCT ECG  2. Essential hypertension  Comments:  Continue current regimen of amlodipine 5 mg and irbesartan 75 mg.  Orders:  -     CBC and differential; Future; Expected date: 03/03/2025  -     Comprehensive metabolic panel; Future; Expected date: 03/03/2025  3. Lumbar spondylolysis  4. Subclinical hypothyroidism  Comments:  Will recheck TSH.  Orders:  -     TSH, 3rd generation; Future; Expected date: 03/03/2025  5. Impaired fasting blood sugar  -     Hemoglobin A1C; Future; Expected date: 03/03/2025         1. Tachycardia (Primary)    - POCT ECG    2. Essential hypertension      3. Lumbar spondylolysis         No problem-specific Assessment & Plan notes found for this encounter.           Subjective:      Patient ID: Rayray Dunn is a 83 y.o. male.    HPI    The following portions of the patient's history were reviewed and updated as appropriate: He  has a past medical history of Arthritis, Basal cell carcinoma, Hypertension, Kidney stone, Skin cancer, and Squamous cell carcinoma of skin.  He   Patient Active Problem List    Diagnosis Date Noted    Rotator cuff strain, left, initial encounter 09/09/2024    Impingement syndrome of left shoulder 01/15/2024    Primary osteoarthritis of left shoulder 01/10/2023    Impingement syndrome of right shoulder 04/25/2022    Primary osteoarthritis of right shoulder 04/25/2022    PVD (peripheral vascular disease) (HCC) 04/14/2021    Lumbar stenosis with neurogenic claudication     BPH associated with nocturia 12/14/2015    White coat syndrome with diagnosis of hypertension 05/18/2012    Mixed hyperlipidemia 05/18/2012     He  has a past surgical history that includes Kidney stone surgery (1982); Meniscectomy (2000); Tonsillectomy and adenoidectomy (1940); and pr ndsc wrst surg w/rls transvrs carpl ligm (Right, 1/19/2021).  His family  history includes Arthritis in his family; Breast cancer in his mother; Cancer in his family; Heart disease in his father; Hypertension in his family.  He  reports that he has never smoked. He has never used smokeless tobacco. He reports that he does not drink alcohol and does not use drugs.  Current Outpatient Medications   Medication Sig Dispense Refill    acetaminophen (TYLENOL) 650 mg CR tablet Take 1 tablet (650 mg total) by mouth every 8 (eight) hours as needed for mild pain 30 tablet 0    amLODIPine (NORVASC) 5 mg tablet TAKE 1 TABLET BY MOUTH DAILY 90 tablet 1    Cholecalciferol (VITAMIN D3) 41782 units TABS Take 2,000 Units by mouth daily       Coenzyme Q10 (COQ10) 100 MG CAPS Take 200 mg by mouth daily      irbesartan (AVAPRO) 150 mg tablet Take 1 tablet (150 mg total) by mouth daily at bedtime Total 225 mg daily 90 tablet 1    irbesartan (AVAPRO) 75 mg tablet Take 1 tablet (75 mg total) by mouth daily at bedtime Total 225 mg daily 90 tablet 1    multivitamin (THERAGRAN) TABS Take 1 tablet by mouth daily      NON FORMULARY RELIEF FACTOR - ICARIIN 400MG , OMEGA 2800MG, CURCUMIN 1334MG, RESERVATROL 140 MG      Omega-3 Fatty Acids (OMEGA-3 CF) 1000 MG CAPS Take 1 capsule by mouth daily      triamcinolone (KENALOG) 0.1 % cream  (Patient not taking: Reported on 9/23/2024)       No current facility-administered medications for this visit.     Current Outpatient Medications on File Prior to Visit   Medication Sig    acetaminophen (TYLENOL) 650 mg CR tablet Take 1 tablet (650 mg total) by mouth every 8 (eight) hours as needed for mild pain    amLODIPine (NORVASC) 5 mg tablet TAKE 1 TABLET BY MOUTH DAILY    Cholecalciferol (VITAMIN D3) 15370 units TABS Take 2,000 Units by mouth daily     Coenzyme Q10 (COQ10) 100 MG CAPS Take 200 mg by mouth daily    irbesartan (AVAPRO) 150 mg tablet Take 1 tablet (150 mg total) by mouth daily at bedtime Total 225 mg daily    irbesartan (AVAPRO) 75 mg tablet Take 1 tablet (75 mg  "total) by mouth daily at bedtime Total 225 mg daily    multivitamin (THERAGRAN) TABS Take 1 tablet by mouth daily    NON FORMULARY RELIEF FACTOR - ICARIIN 400MG , OMEGA 2800MG, CURCUMIN 1334MG, RESERVATROL 140 MG    Omega-3 Fatty Acids (OMEGA-3 CF) 1000 MG CAPS Take 1 capsule by mouth daily    triamcinolone (KENALOG) 0.1 % cream  (Patient not taking: Reported on 9/23/2024)     No current facility-administered medications on file prior to visit.     There are no discontinued medications.   He is allergic to sulfate..    Review of Systems   Constitutional:  Negative for appetite change, chills, fatigue and fever.   HENT:  Negative for sore throat and trouble swallowing.    Eyes:  Negative for redness.   Respiratory:  Negative for shortness of breath.    Cardiovascular:  Negative for chest pain and palpitations.   Gastrointestinal:  Negative for abdominal pain, constipation and diarrhea.   Genitourinary:  Negative for dysuria and hematuria.   Musculoskeletal:  Negative for back pain and neck pain.   Skin:  Negative for rash.   Neurological:  Negative for seizures, weakness and headaches.   Hematological:  Negative for adenopathy.   Psychiatric/Behavioral:  Negative for confusion. The patient is not nervous/anxious.          Objective:      /74 (BP Location: Left arm, Patient Position: Sitting, Cuff Size: Standard)   Pulse 90   Temp 98.4 °F (36.9 °C) (Temporal)   Ht 5' 6\" (1.676 m)   Wt 81.2 kg (179 lb)   SpO2 98%   BMI 28.89 kg/m²     Results Reviewed       None            No results found for this or any previous visit (from the past 8 weeks).     Physical Exam  Constitutional:       General: He is not in acute distress.     Appearance: Normal appearance.   HENT:      Head: Normocephalic and atraumatic.      Left Ear: There is impacted cerumen.      Nose: Nose normal.      Mouth/Throat:      Mouth: Mucous membranes are moist.   Eyes:      Extraocular Movements: Extraocular movements intact.      Pupils: " Pupils are equal, round, and reactive to light.   Cardiovascular:      Rate and Rhythm: Normal rate and regular rhythm.      Pulses: Normal pulses.      Heart sounds: Normal heart sounds. No murmur heard.     No friction rub.   Pulmonary:      Effort: Pulmonary effort is normal. No respiratory distress.      Breath sounds: Normal breath sounds. No wheezing.   Abdominal:      General: Abdomen is flat. Bowel sounds are normal. There is no distension.      Palpations: Abdomen is soft. There is no mass.      Tenderness: There is no abdominal tenderness. There is no guarding.   Musculoskeletal:         General: Normal range of motion.      Cervical back: Neck supple.   Neurological:      General: No focal deficit present.      Mental Status: He is alert and oriented to person, place, and time. Mental status is at baseline.      Cranial Nerves: No cranial nerve deficit.   Psychiatric:         Mood and Affect: Mood normal.         Behavior: Behavior normal.       EKG: normal EKG, normal sinus rhythm.

## 2024-12-19 ENCOUNTER — ESTABLISHED COMPREHENSIVE EXAM (OUTPATIENT)
Dept: URBAN - METROPOLITAN AREA CLINIC 6 | Facility: CLINIC | Age: 84
End: 2024-12-19

## 2024-12-19 DIAGNOSIS — H35.3131: ICD-10-CM

## 2024-12-19 DIAGNOSIS — H02.835: ICD-10-CM

## 2024-12-19 DIAGNOSIS — H02.832: ICD-10-CM

## 2024-12-19 DIAGNOSIS — H25.813: ICD-10-CM

## 2024-12-19 DIAGNOSIS — H10.45: ICD-10-CM

## 2024-12-19 DIAGNOSIS — H18.513: ICD-10-CM

## 2024-12-19 DIAGNOSIS — H04.123: ICD-10-CM

## 2024-12-19 DIAGNOSIS — H35.371: ICD-10-CM

## 2024-12-19 PROCEDURE — 92014 COMPRE OPH EXAM EST PT 1/>: CPT

## 2024-12-19 PROCEDURE — 92134 CPTRZ OPH DX IMG PST SGM RTA: CPT

## 2024-12-19 ASSESSMENT — VISUAL ACUITY
OD_CC: 20/25-2
OS_CC: 20/30+1
OU_CC: J1+

## 2024-12-19 ASSESSMENT — TONOMETRY
OS_IOP_MMHG: 11
OD_IOP_MMHG: 14

## 2024-12-30 DIAGNOSIS — I10 ESSENTIAL HYPERTENSION: ICD-10-CM

## 2024-12-30 NOTE — TELEPHONE ENCOUNTER
Reason for call:   [x] Refill   [] Prior Auth  [] Other:     Office:   [x] PCP/Provider - [facility]  Authorized By: Favian Mendieta MD    [] Specialty/Provider -     Medication: irbesartan (AVAPRO) 150 mg tablet    Dose/Frequency: Take 1 tablet (150 mg total) by mouth daily at bedtime    Quantity: 90 tablet    Pharmacy: [facility] - [place], [place] - [address]    Does the patient have enough for 3 days?   [x] Yes   [] No - Send as HP to POD    Reason for call:   [x] Refill   [] Prior Auth  [] Other:     Office:   [x] PCP/Provider - [facility]  Authorized By: Favian Mendieta MD    [] Specialty/Provider -     Medication: irbesartan (AVAPRO) 75 mg tablet    Dose/Frequency: Take 1 tablet (75 mg total) by mouth daily at bedtime Total 225 mg daily    Quantity: 90 tablet    Pharmacy: [facility] - [place], [place] - [address]    Does the patient have enough for 3 days?   [x] Yes   [] No - Send as HP to POD

## 2024-12-31 RX ORDER — IRBESARTAN 75 MG/1
75 TABLET ORAL
Qty: 90 TABLET | Refills: 1 | Status: SHIPPED | OUTPATIENT
Start: 2024-12-31

## 2024-12-31 RX ORDER — IRBESARTAN 150 MG/1
150 TABLET ORAL
Qty: 90 TABLET | Refills: 1 | Status: SHIPPED | OUTPATIENT
Start: 2024-12-31

## 2025-01-14 ENCOUNTER — TELEPHONE (OUTPATIENT)
Age: 85
End: 2025-01-14

## 2025-01-14 NOTE — TELEPHONE ENCOUNTER
Pt has a dry cough sore throat, no fever. Started Sunday night would like over the counter med recommendation. Please follow up

## 2025-03-19 LAB
ALBUMIN SERPL-MCNC: 4.1 G/DL (ref 3.6–5.1)
ALBUMIN/GLOB SERPL: 1.5 (CALC) (ref 1–2.5)
ALP SERPL-CCNC: 62 U/L (ref 35–144)
ALT SERPL-CCNC: 16 U/L (ref 9–46)
AST SERPL-CCNC: 22 U/L (ref 10–35)
BASOPHILS # BLD AUTO: 88 CELLS/UL (ref 0–200)
BASOPHILS NFR BLD AUTO: 1.2 %
BILIRUB SERPL-MCNC: 0.8 MG/DL (ref 0.2–1.2)
BUN SERPL-MCNC: 21 MG/DL (ref 7–25)
BUN/CREAT SERPL: ABNORMAL (CALC) (ref 6–22)
CALCIUM SERPL-MCNC: 9.6 MG/DL (ref 8.6–10.3)
CHLORIDE SERPL-SCNC: 104 MMOL/L (ref 98–110)
CO2 SERPL-SCNC: 29 MMOL/L (ref 20–32)
CREAT SERPL-MCNC: 1.01 MG/DL (ref 0.7–1.22)
EOSINOPHIL # BLD AUTO: 314 CELLS/UL (ref 15–500)
EOSINOPHIL NFR BLD AUTO: 4.3 %
ERYTHROCYTE [DISTWIDTH] IN BLOOD BY AUTOMATED COUNT: 12.7 % (ref 11–15)
GFR/BSA.PRED SERPLBLD CYS-BASED-ARV: 73 ML/MIN/1.73M2
GLOBULIN SER CALC-MCNC: 2.8 G/DL (CALC) (ref 1.9–3.7)
GLUCOSE SERPL-MCNC: 114 MG/DL (ref 65–99)
HBA1C MFR BLD: 6.1 % OF TOTAL HGB
HCT VFR BLD AUTO: 45 % (ref 38.5–50)
HGB BLD-MCNC: 15 G/DL (ref 13.2–17.1)
LYMPHOCYTES # BLD AUTO: 2307 CELLS/UL (ref 850–3900)
LYMPHOCYTES NFR BLD AUTO: 31.6 %
MCH RBC QN AUTO: 30.2 PG (ref 27–33)
MCHC RBC AUTO-ENTMCNC: 33.3 G/DL (ref 32–36)
MCV RBC AUTO: 90.7 FL (ref 80–100)
MONOCYTES # BLD AUTO: 883 CELLS/UL (ref 200–950)
MONOCYTES NFR BLD AUTO: 12.1 %
NEUTROPHILS # BLD AUTO: 3708 CELLS/UL (ref 1500–7800)
NEUTROPHILS NFR BLD AUTO: 50.8 %
PLATELET # BLD AUTO: 252 THOUSAND/UL (ref 140–400)
PMV BLD REES-ECKER: 11 FL (ref 7.5–12.5)
POTASSIUM SERPL-SCNC: 4.4 MMOL/L (ref 3.5–5.3)
PROT SERPL-MCNC: 6.9 G/DL (ref 6.1–8.1)
RBC # BLD AUTO: 4.96 MILLION/UL (ref 4.2–5.8)
SODIUM SERPL-SCNC: 137 MMOL/L (ref 135–146)
TSH SERPL-ACNC: 3.73 MIU/L (ref 0.4–4.5)
WBC # BLD AUTO: 7.3 THOUSAND/UL (ref 3.8–10.8)

## 2025-03-21 ENCOUNTER — OFFICE VISIT (OUTPATIENT)
Dept: INTERNAL MEDICINE CLINIC | Facility: CLINIC | Age: 85
End: 2025-03-21
Payer: MEDICARE

## 2025-03-21 VITALS
BODY MASS INDEX: 28.77 KG/M2 | OXYGEN SATURATION: 96 % | DIASTOLIC BLOOD PRESSURE: 80 MMHG | HEART RATE: 79 BPM | TEMPERATURE: 98.1 F | WEIGHT: 179 LBS | HEIGHT: 66 IN | SYSTOLIC BLOOD PRESSURE: 130 MMHG

## 2025-03-21 DIAGNOSIS — H92.02 LEFT EAR PAIN: ICD-10-CM

## 2025-03-21 DIAGNOSIS — L30.9 DERMATITIS: ICD-10-CM

## 2025-03-21 DIAGNOSIS — M19.012 PRIMARY OSTEOARTHRITIS OF LEFT SHOULDER: ICD-10-CM

## 2025-03-21 DIAGNOSIS — M43.06 LUMBAR SPONDYLOLYSIS: ICD-10-CM

## 2025-03-21 DIAGNOSIS — I10 ESSENTIAL HYPERTENSION: Primary | ICD-10-CM

## 2025-03-21 DIAGNOSIS — R73.01 IMPAIRED FASTING BLOOD SUGAR: ICD-10-CM

## 2025-03-21 PROCEDURE — G2211 COMPLEX E/M VISIT ADD ON: HCPCS | Performed by: INTERNAL MEDICINE

## 2025-03-21 PROCEDURE — 99214 OFFICE O/P EST MOD 30 MIN: CPT | Performed by: INTERNAL MEDICINE

## 2025-03-21 RX ORDER — OFLOXACIN 3 MG/ML
10 SOLUTION AURICULAR (OTIC) 2 TIMES DAILY
Qty: 15 ML | Refills: 0 | Status: SHIPPED | OUTPATIENT
Start: 2025-03-21

## 2025-03-21 RX ORDER — BENZOCAINE/MENTHOL 6 MG-10 MG
LOZENGE MUCOUS MEMBRANE 3 TIMES DAILY
Qty: 100 G | Refills: 3 | Status: SHIPPED | OUTPATIENT
Start: 2025-03-21 | End: 2025-03-21

## 2025-03-21 RX ORDER — BENZOCAINE/MENTHOL 6 MG-10 MG
LOZENGE MUCOUS MEMBRANE 3 TIMES DAILY
Qty: 100 G | Refills: 3 | Status: SHIPPED | OUTPATIENT
Start: 2025-03-21

## 2025-03-21 NOTE — PROGRESS NOTES
Assessment/Plan:           1. Essential hypertension  Comments:  Stable continue current regimen.  2. Lumbar spondylolysis  Comments:  This is stable.  Conservative management advised..  3. Impaired fasting blood sugar  Comments:  Continue diabetic diet.  4. Primary osteoarthritis of left shoulder  5. Dermatitis  -     hydrocortisone 1 % cream; Apply topically 3 (three) times a day  6. Left ear pain  -     ofloxacin (FLOXIN) 0.3 % otic solution; Administer 10 drops into the left ear 2 (two) times a day         1. Essential hypertension (Primary)      2. Lumbar spondylolysis      3. Impaired fasting blood sugar      4. Primary osteoarthritis of left shoulder      5. Dermatitis         No problem-specific Assessment & Plan notes found for this encounter.           Subjective:      Patient ID: Rayray Dunn is a 84 y.o. male.    HPI    The following portions of the patient's history were reviewed and updated as appropriate: He  has a past medical history of Arthritis, Basal cell carcinoma, Hypertension, Kidney stone, Skin cancer, and Squamous cell carcinoma of skin.  He   Patient Active Problem List    Diagnosis Date Noted    Rotator cuff strain, left, initial encounter 09/09/2024    Impingement syndrome of left shoulder 01/15/2024    Primary osteoarthritis of left shoulder 01/10/2023    Impingement syndrome of right shoulder 04/25/2022    Primary osteoarthritis of right shoulder 04/25/2022    PVD (peripheral vascular disease) (HCC) 04/14/2021    Lumbar stenosis with neurogenic claudication     BPH associated with nocturia 12/14/2015    White coat syndrome with diagnosis of hypertension 05/18/2012    Mixed hyperlipidemia 05/18/2012     He  has a past surgical history that includes Kidney stone surgery (1982); Meniscectomy (2000); Tonsillectomy and adenoidectomy (1940); and pr ndsc wrst surg w/rls transvrs carpl ligm (Right, 1/19/2021).  His family history includes Arthritis in his family; Breast cancer in his  mother; Cancer in his family; Heart disease in his father; Hypertension in his family.  He  reports that he has never smoked. He has never used smokeless tobacco. He reports that he does not drink alcohol and does not use drugs.  Current Outpatient Medications   Medication Sig Dispense Refill    acetaminophen (TYLENOL) 650 mg CR tablet Take 1 tablet (650 mg total) by mouth every 8 (eight) hours as needed for mild pain 30 tablet 0    amLODIPine (NORVASC) 5 mg tablet TAKE 1 TABLET BY MOUTH DAILY 90 tablet 1    Cholecalciferol (VITAMIN D3) 27907 units TABS Take 2,000 Units by mouth daily       Coenzyme Q10 (COQ10) 100 MG CAPS Take 200 mg by mouth daily      hydrocortisone 1 % cream Apply topically 3 (three) times a day 100 g 3    irbesartan (AVAPRO) 150 mg tablet Take 1 tablet (150 mg total) by mouth daily at bedtime Total 225 mg daily 90 tablet 1    irbesartan (AVAPRO) 75 mg tablet Take 1 tablet (75 mg total) by mouth daily at bedtime Total 225 mg daily 90 tablet 1    multivitamin (THERAGRAN) TABS Take 1 tablet by mouth daily      NON FORMULARY RELIEF FACTOR - ICARIIN 400MG , OMEGA 2800MG, CURCUMIN 1334MG, RESERVATROL 140 MG      ofloxacin (FLOXIN) 0.3 % otic solution Administer 10 drops into the left ear 2 (two) times a day 15 mL 0    Omega-3 Fatty Acids (OMEGA-3 CF) 1000 MG CAPS Take 1 capsule by mouth daily      triamcinolone (KENALOG) 0.1 % cream  (Patient not taking: Reported on 9/23/2024)       No current facility-administered medications for this visit.     Current Outpatient Medications on File Prior to Visit   Medication Sig    acetaminophen (TYLENOL) 650 mg CR tablet Take 1 tablet (650 mg total) by mouth every 8 (eight) hours as needed for mild pain    amLODIPine (NORVASC) 5 mg tablet TAKE 1 TABLET BY MOUTH DAILY    Cholecalciferol (VITAMIN D3) 51664 units TABS Take 2,000 Units by mouth daily     Coenzyme Q10 (COQ10) 100 MG CAPS Take 200 mg by mouth daily    irbesartan (AVAPRO) 150 mg tablet Take 1 tablet  "(150 mg total) by mouth daily at bedtime Total 225 mg daily    irbesartan (AVAPRO) 75 mg tablet Take 1 tablet (75 mg total) by mouth daily at bedtime Total 225 mg daily    multivitamin (THERAGRAN) TABS Take 1 tablet by mouth daily    NON FORMULARY RELIEF FACTOR - ICARIIN 400MG , OMEGA 2800MG, CURCUMIN 1334MG, RESERVATROL 140 MG    Omega-3 Fatty Acids (OMEGA-3 CF) 1000 MG CAPS Take 1 capsule by mouth daily    triamcinolone (KENALOG) 0.1 % cream  (Patient not taking: Reported on 9/23/2024)     No current facility-administered medications on file prior to visit.     Medications Discontinued During This Encounter   Medication Reason    hydrocortisone 1 % cream       He is allergic to sulfate..    Review of Systems   Constitutional:  Negative for appetite change, chills, fatigue and fever.   HENT:  Positive for ear pain. Negative for sore throat and trouble swallowing.    Eyes:  Negative for redness.   Respiratory:  Negative for shortness of breath.    Cardiovascular:  Negative for chest pain and palpitations.   Gastrointestinal:  Negative for abdominal pain, constipation and diarrhea.   Genitourinary:  Negative for dysuria and hematuria.   Musculoskeletal:  Negative for back pain and neck pain.   Skin:  Negative for rash.   Neurological:  Negative for seizures, weakness and headaches.   Hematological:  Negative for adenopathy.   Psychiatric/Behavioral:  Negative for confusion. The patient is not nervous/anxious.          Objective:      /80   Pulse 79   Temp 98.1 °F (36.7 °C) (Temporal)   Ht 5' 6\" (1.676 m)   Wt 81.2 kg (179 lb)   SpO2 96%   BMI 28.89 kg/m²     Results Reviewed       None            Recent Results (from the past 8 weeks)   Comprehensive metabolic panel    Collection Time: 03/18/25  8:41 AM   Result Value Ref Range    Glucose, Random 114 (H) 65 - 99 mg/dL    BUN 21 7 - 25 mg/dL    Creatinine 1.01 0.70 - 1.22 mg/dL    eGFR 73 > OR = 60 mL/min/1.73m2    SL AMB BUN/CREATININE RATIO SEE NOTE: 6 - " 22 (calc)    Sodium 137 135 - 146 mmol/L    Potassium 4.4 3.5 - 5.3 mmol/L    Chloride 104 98 - 110 mmol/L    CO2 29 20 - 32 mmol/L    Calcium 9.6 8.6 - 10.3 mg/dL    Protein, Total 6.9 6.1 - 8.1 g/dL    Albumin 4.1 3.6 - 5.1 g/dL    Globulin 2.8 1.9 - 3.7 g/dL (calc)    Albumin/Globulin Ratio 1.5 1.0 - 2.5 (calc)    TOTAL BILIRUBIN 0.8 0.2 - 1.2 mg/dL    Alkaline Phosphatase 62 35 - 144 U/L    AST 22 10 - 35 U/L    ALT 16 9 - 46 U/L   CBC and differential    Collection Time: 03/18/25  8:41 AM   Result Value Ref Range    White Blood Cell Count 7.3 3.8 - 10.8 Thousand/uL    Red Blood Cell Count 4.96 4.20 - 5.80 Million/uL    Hemoglobin 15.0 13.2 - 17.1 g/dL    HCT 45.0 38.5 - 50.0 %    MCV 90.7 80.0 - 100.0 fL    MCH 30.2 27.0 - 33.0 pg    MCHC 33.3 32.0 - 36.0 g/dL    RDW 12.7 11.0 - 15.0 %    Platelet Count 252 140 - 400 Thousand/uL    SL AMB MPV 11.0 7.5 - 12.5 fL    Neutrophils (Absolute) 3,708 1,500 - 7,800 cells/uL    Lymphocytes (Absolute) 2,307 850 - 3,900 cells/uL    Monocytes (Absolute) 883 200 - 950 cells/uL    Eosinophils (Absolute) 314 15 - 500 cells/uL    Basophils ABS 88 0 - 200 cells/uL    Neutrophils 50.8 %    Lymphocytes 31.6 %    Monocytes 12.1 %    Eosinophils 4.3 %    Basophils PCT 1.2 %   TSH, 3rd generation    Collection Time: 03/18/25  8:41 AM   Result Value Ref Range    TSH 3.73 0.40 - 4.50 mIU/L   Hemoglobin A1c (w/out EAG)    Collection Time: 03/18/25  8:41 AM   Result Value Ref Range    Hemoglobin A1C 6.1 (H) <5.7 % of total Hgb        Physical Exam  Constitutional:       General: He is not in acute distress.     Appearance: Normal appearance. He is obese.   HENT:      Head: Normocephalic and atraumatic.      Nose: Nose normal.      Mouth/Throat:      Mouth: Mucous membranes are moist.   Eyes:      Extraocular Movements: Extraocular movements intact.      Pupils: Pupils are equal, round, and reactive to light.   Cardiovascular:      Rate and Rhythm: Normal rate and regular rhythm.       Pulses: Normal pulses.      Heart sounds: Normal heart sounds. No murmur heard.     No friction rub.   Pulmonary:      Effort: Pulmonary effort is normal. No respiratory distress.      Breath sounds: Normal breath sounds. No wheezing.   Abdominal:      General: Abdomen is flat. Bowel sounds are normal. There is no distension.      Palpations: Abdomen is soft. There is no mass.      Tenderness: There is no abdominal tenderness. There is no guarding.   Musculoskeletal:         General: Normal range of motion.      Cervical back: Neck supple.   Neurological:      General: No focal deficit present.      Mental Status: He is alert and oriented to person, place, and time. Mental status is at baseline.      Cranial Nerves: No cranial nerve deficit.   Psychiatric:         Mood and Affect: Mood normal.         Behavior: Behavior normal.

## 2025-03-24 ENCOUNTER — TELEPHONE (OUTPATIENT)
Age: 85
End: 2025-03-24

## 2025-03-24 NOTE — TELEPHONE ENCOUNTER
Pt called in and said that over weekend he used the ofloxacin, and now his ear is completely clogged. Wants to know if something should be ordered to help with that or if he needs to be seen again. He stopped using the ofloxacin. Please advise.

## 2025-03-27 ENCOUNTER — OFFICE VISIT (OUTPATIENT)
Dept: INTERNAL MEDICINE CLINIC | Facility: CLINIC | Age: 85
End: 2025-03-27
Payer: MEDICARE

## 2025-03-27 ENCOUNTER — NURSE TRIAGE (OUTPATIENT)
Age: 85
End: 2025-03-27

## 2025-03-27 VITALS
SYSTOLIC BLOOD PRESSURE: 124 MMHG | HEIGHT: 66 IN | WEIGHT: 185.8 LBS | TEMPERATURE: 101.5 F | DIASTOLIC BLOOD PRESSURE: 70 MMHG | OXYGEN SATURATION: 98 % | BODY MASS INDEX: 29.86 KG/M2 | HEART RATE: 118 BPM

## 2025-03-27 DIAGNOSIS — I10 PRIMARY HYPERTENSION: ICD-10-CM

## 2025-03-27 DIAGNOSIS — J06.9 VIRAL UPPER RESPIRATORY TRACT INFECTION: Primary | ICD-10-CM

## 2025-03-27 DIAGNOSIS — R05.9 COUGH IN ADULT: ICD-10-CM

## 2025-03-27 LAB
SARS-COV-2 AG UPPER RESP QL IA: POSITIVE
VALID CONTROL: ABNORMAL

## 2025-03-27 PROCEDURE — 87811 SARS-COV-2 COVID19 W/OPTIC: CPT | Performed by: INTERNAL MEDICINE

## 2025-03-27 PROCEDURE — G2211 COMPLEX E/M VISIT ADD ON: HCPCS | Performed by: INTERNAL MEDICINE

## 2025-03-27 PROCEDURE — 99213 OFFICE O/P EST LOW 20 MIN: CPT | Performed by: INTERNAL MEDICINE

## 2025-03-27 RX ORDER — BENZONATATE 100 MG/1
100 CAPSULE ORAL 3 TIMES DAILY PRN
Qty: 20 CAPSULE | Refills: 0 | Status: SHIPPED | OUTPATIENT
Start: 2025-03-27

## 2025-03-27 RX ORDER — FLUTICASONE PROPIONATE 50 MCG
1 SPRAY, SUSPENSION (ML) NASAL DAILY
Qty: 5 ML | Refills: 0 | Status: SHIPPED | OUTPATIENT
Start: 2025-03-27

## 2025-03-27 NOTE — TELEPHONE ENCOUNTER
"                FOLLOW UP: appointment for today    REASON FOR CONVERSATION: Influenza    SYMPTOMS: congestion cough    OTHER: denies nausea, vomiting and diarrhea    DISPOSITION: See Today or Tomorrow in Office            Reason for Disposition   Patient wants to be seen    Answer Assessment - Initial Assessment Questions  1. WORST SYMPTOM: \"What is your worst symptom?\" (e.g., cough, runny nose, muscle aches, headache, sore throat, fever)       Sough congestion  2. ONSET: \"When did your flu symptoms start?\"       today  3. COUGH: \"How bad is the cough?\"        consistent  4. RESPIRATORY DISTRESS: \"Describe your breathing.\"       Did not state    5. FEVER: \"Do you have a fever?\" If Yes, ask: \"What is your temperature, how was it measured, and when did it start?\"      Yes...  6. EXPOSURE: \"Were you exposed to someone with influenza?\"        Did not state  7. FLU VACCINE: \"Did you get a flu shot this year?\"      Yes 11/24  8. HIGH RISK DISEASE: \"Do you have any chronic medical problems?\" (e.g., heart or lung disease, asthma, weak immune system, or other HIGH RISK conditions)      Did not state  10. OTHER SYMPTOMS: \"Do you have any other symptoms?\"  (e.g., runny nose, muscle aches, headache, sore throat)        Runny nose    Protocols used: Influenza (Flu) - Seasonal-Adult-[place]    "

## 2025-03-27 NOTE — PROGRESS NOTES
"Assessment/Plan:    Follow up  Upper Respiratory  tract infection  COVID positive   Diagnoses and all orders for this visit:    Viral upper respiratory tract infection  -     benzonatate (TESSALON PERLES) 100 mg capsule; Take 1 capsule (100 mg total) by mouth 3 (three) times a day as needed for cough  -     fluticasone (FLONASE) 50 mcg/act nasal spray; 1 spray into each nostril daily    Primary hypertension          Subjective: Cough  and  congestion     Patient ID: Rayray Dunn is a 84 y.o. male.    HPI    The following portions of the patient's history were reviewed and updated as appropriate: allergies, current medications, past family history, past medical history, past social history, past surgical history, and problem list.    Review of Systems   Constitutional: Negative.    HENT:  Negative for dental problem, drooling, ear discharge and ear pain.    Eyes:  Negative for discharge, redness and itching.   Respiratory:  Positive for cough. Negative for apnea and wheezing.    Cardiovascular:  Negative for chest pain and palpitations.   Gastrointestinal:  Negative for abdominal pain, blood in stool, diarrhea and vomiting.   Endocrine: Negative for polydipsia, polyphagia and polyuria.   Genitourinary:  Negative for decreased urine volume, dysuria and frequency.   Musculoskeletal:  Negative for arthralgias, myalgias and neck stiffness.   Skin:  Negative for pallor and wound.   Allergic/Immunologic: Negative for environmental allergies and food allergies.   Neurological:  Negative for facial asymmetry, light-headedness, numbness and headaches.   Hematological:  Negative for adenopathy. Does not bruise/bleed easily.   Psychiatric/Behavioral:  Negative for agitation, behavioral problems and confusion.          Objective:      /70 (BP Location: Left arm, Patient Position: Sitting, Cuff Size: Standard)   Pulse (!) 118   Temp (!) 101.5 °F (38.6 °C) (Temporal)   Ht 5' 6\" (1.676 m)   Wt 84.3 kg (185 lb 12.8 " oz)   SpO2 98%   BMI 29.99 kg/m²          Physical Exam  Constitutional:       Appearance: Normal appearance.   HENT:      Head: Normocephalic.      Nose: Nose normal.      Mouth/Throat:      Mouth: Mucous membranes are moist.   Eyes:      Pupils: Pupils are equal, round, and reactive to light.   Cardiovascular:      Rate and Rhythm: Regular rhythm.      Heart sounds: Normal heart sounds.   Pulmonary:      Breath sounds: Normal breath sounds.   Abdominal:      Palpations: Abdomen is soft.   Musculoskeletal:         General: No swelling.      Cervical back: Neck supple.   Skin:     General: Skin is warm.   Neurological:      General: No focal deficit present.      Mental Status: He is alert and oriented to person, place, and time.   Psychiatric:         Mood and Affect: Mood normal.       COVID viral  infection    Not  sick enough to  use  paxilove   Will  treat  symptoms with  Tessalon perles. And Flonase.    Call if does not  improve in  48 hours.    Kevin Pardo MD.FACP

## 2025-04-28 ENCOUNTER — OFFICE VISIT (OUTPATIENT)
Dept: OBGYN CLINIC | Facility: OTHER | Age: 85
End: 2025-04-28
Payer: MEDICARE

## 2025-04-28 DIAGNOSIS — M75.41 IMPINGEMENT SYNDROME OF RIGHT SHOULDER: ICD-10-CM

## 2025-04-28 DIAGNOSIS — M19.011 PRIMARY OSTEOARTHRITIS OF RIGHT SHOULDER: Primary | ICD-10-CM

## 2025-04-28 PROCEDURE — 20610 DRAIN/INJ JOINT/BURSA W/O US: CPT | Performed by: ORTHOPAEDIC SURGERY

## 2025-04-28 PROCEDURE — 99213 OFFICE O/P EST LOW 20 MIN: CPT | Performed by: ORTHOPAEDIC SURGERY

## 2025-04-28 RX ORDER — BUPIVACAINE HYDROCHLORIDE 2.5 MG/ML
2 INJECTION, SOLUTION INFILTRATION; PERINEURAL
Status: COMPLETED | OUTPATIENT
Start: 2025-04-28 | End: 2025-04-28

## 2025-04-28 RX ORDER — BETAMETHASONE SODIUM PHOSPHATE AND BETAMETHASONE ACETATE 3; 3 MG/ML; MG/ML
6 INJECTION, SUSPENSION INTRA-ARTICULAR; INTRALESIONAL; INTRAMUSCULAR; SOFT TISSUE
Status: COMPLETED | OUTPATIENT
Start: 2025-04-28 | End: 2025-04-28

## 2025-04-28 RX ADMIN — BETAMETHASONE SODIUM PHOSPHATE AND BETAMETHASONE ACETATE 6 MG: 3; 3 INJECTION, SUSPENSION INTRA-ARTICULAR; INTRALESIONAL; INTRAMUSCULAR; SOFT TISSUE at 10:45

## 2025-04-28 RX ADMIN — BUPIVACAINE HYDROCHLORIDE 2 ML: 2.5 INJECTION, SOLUTION INFILTRATION; PERINEURAL at 10:45

## 2025-04-28 NOTE — ASSESSMENT & PLAN NOTE
Patient continues to have an examination and symptoms that are consistent with mild glenohumeral joint osteoarthritis with rotator cuff tendinitis, probable tear. He was offered an MRI scan of this right shoulder but declined due to his significant benefit of a cortisone injection last time. I certainly think this is a reasonable option for the patient due to his age and even if he did have an acute rotator cuff tear the healing rates in an individual older than 80 are low. He understood and all questions were answered   He was provided with a repeat subacromial injection today in the office. This is documented appropriately below  Continue home exercise program as tolerated  Follow up on an as needed basis

## 2025-04-28 NOTE — PROGRESS NOTES
Assessment & Plan  Impingement syndrome of right shoulder    Primary osteoarthritis of right shoulder  Patient continues to have an examination and symptoms that are consistent with mild glenohumeral joint osteoarthritis with rotator cuff tendinitis, probable tear. He was offered an MRI scan of this right shoulder but declined due to his significant benefit of a cortisone injection last time. I certainly think this is a reasonable option for the patient due to his age and even if he did have an acute rotator cuff tear the healing rates in an individual older than 80 are low. He understood and all questions were answered   He was provided with a repeat subacromial injection today in the office. This is documented appropriately below  Continue home exercise program as tolerated  Follow up on an as needed basis    Subjective:   Patient ID: Rayray Dunn is a 84 y.o. male presents today for a follow up visit for his RIGHT shoulder, he has known mild glenohumeral joint osteoarthritis with rotator cuff tendonitis. This has been mildly symptomatic recently without any acute injury or trauma. Patient reports that he received significant benefit from his cortisone injection on 9/9/24 and is requesting to repeat an injection today. Pain is worse with overhead and repetitive motions. No numbness or tingling. No fevers or chills.     The following portions of the patient's history were reviewed and updated as appropriate: allergies, current medications, past family history, past medical history, past social history, past surgical history and problem list.    Objective:  There were no vitals taken for this visit.      Right Shoulder Exam     Range of Motion   The patient has normal right shoulder ROM.    Muscle Strength   Abduction: 4/5   External rotation: 4/5     Tests   Covarrubias test: positive  Impingement: positive    Other   Erythema: absent  Sensation: normal  Pulse: present            Physical Exam  Constitutional:   "     General: He is not in acute distress.     Appearance: He is well-developed.   Eyes:      Conjunctiva/sclera: Conjunctivae normal.      Pupils: Pupils are equal, round, and reactive to light.   Cardiovascular:      Rate and Rhythm: Normal rate and regular rhythm.   Pulmonary:      Effort: Pulmonary effort is normal.      Breath sounds: Normal breath sounds.   Abdominal:      General: Bowel sounds are normal.      Palpations: Abdomen is soft.   Musculoskeletal:      Cervical back: Normal range of motion and neck supple.   Skin:     General: Skin is warm and dry.      Findings: No erythema or rash.   Neurological:      Mental Status: He is alert and oriented to person, place, and time.      Deep Tendon Reflexes: Reflexes are normal and symmetric.   Psychiatric:         Behavior: Behavior normal.         Large joint arthrocentesis: R subacromial bursa  [place] Protocol:  procedure performed by consultantConsent: Verbal consent obtained.  Risks and benefits: risks, benefits and alternatives were discussed  Consent given by: patient  Time out: Immediately prior to procedure a \"time out\" was called to verify the correct patient, procedure, equipment, support staff and site/side marked as required.  Site marked: the operative site was marked  Radiology Images displayed and confirmed. If images not available, report reviewed: imaging studies available  Patient identity confirmed: verbally with patient  Supporting Documentation  Indications: pain and diagnostic evaluation     Is this a Visco injection? NoProcedure Details  Location: shoulder - R subacromial bursa  Preparation: Patient was prepped and draped in the usual sterile fashion  Needle size: 22 G  Ultrasound guidance: no  Approach: lateral  Medications administered: 2 mL bupivacaine 0.25 %; 6 mg betamethasone acetate-betamethasone sodium phosphate 6 (3-3) mg/mL    Patient tolerance: patient tolerated the procedure well with no immediate complications  Dressing: "  Sterile dressing applied          Records Reviewed: office notes from prior office visits    Scribe Attestation      I,:  Mark Salcido am acting as a scribe while in the presence of the attending physician.:       I,:  Felix Galindo MD personally performed the services described in this documentation    as scribed in my presence.:

## 2025-04-28 NOTE — PATIENT INSTRUCTIONS
Dr Luba Ba    CORTICOSTEROID INJECTION  What is a corticosteroid?  Injuries or disease such as arthritis, bursitis or tendonitis result in inflammation.  In turn, this inflammation can cause swelling and pain.  A local injection of a corticosteroid is provided to diminish inflammation.  By doing so, it will also decrease pain and swelling which is making you uncomfortable.     Is this the same thing as a Cortisone Injection?  Cortisone® is a brand name of a corticosteroid used commonly in the past.  Today I commonly use a more water-soluble corticosteroid named Celestone®.    Will the injection hurt?  As with any injection, you may feel pain at the time of the injection. Typically, I use a local anesthetic (Marcaine®) in addition to the corticosteroid to determine if the injection has been placed in the appropriate location.  Hence it is important to monitor your symptoms 4-6 hours after the injection, as the area will be anesthetized (numb) while the local anesthetic is working.  Once the local anesthetic wears off, the intensity of pain can be the same as it was prior to the injection, or even worse.  This does not mean that the injection is not working.  The corticosteroid may take 24-72 hours to begin having a positive effect.    If you do experience an increase in pain, the use of an ice pack on the area for 20 minutes at a time should help.  It is also helpful to take an oral anti-inflammatory such as Tylenol® or Motrin® if you are able to medically do so.  For this reason it is best to avoid activities that put stress on the area the first 24 hours after the injection.    How long will pain relief last?  This will vary according to the type and severity of the symptoms being treated and the severity of the condition.  Symptom relief may last weeks to months.  I typically couple injections with physical therapy so that the underlying problem causing the inflammation may be treated as the pain  diminishes.  If the combination is not successful, you may be a surgical candidate.    I have read bad things about steroids.  Will these things happen to me?  Corticosteroids, when utilized properly, are safe and effective drugs.  When used in a low dose, potential adverse reactions are very rare.  Some patients may experience a sensation of flushing for several days.  Very rarely, there can be a local reaction which may include increased discomfort for a period of time in the areas that has been injected.  A steroid should not be used over and over again.  Multiple injections in the same area can produce adverse effects such as tissue atrophy and degeneration of tendon or cartilage.  A small percentage of patients (< 0.1%) may develop an infection in the joint after injection.  This is a treatable problem, but if neglected, may result in permanent disability.  Signs of infection include redness, swelling, discharge, fevers, increasing pain and drainage from the injection site.  This represents an emergency and you should contact our office immediately or seek treatment in the ER if after hours.    If I have diabetes, will this injection affect me?  If you are diabetic, an injection of a corticosteroid can raise your blood sugar level, requiring more insulin for a brief period of time.  This may necessitate careful blood sugar maintenance.  If the elevated sugars are not able to be controlled, contact your diabetic doctor for guidance.

## 2025-05-20 ENCOUNTER — OFFICE VISIT (OUTPATIENT)
Dept: INTERNAL MEDICINE CLINIC | Facility: CLINIC | Age: 85
End: 2025-05-20
Payer: MEDICARE

## 2025-05-20 VITALS
BODY MASS INDEX: 29.15 KG/M2 | HEIGHT: 66 IN | HEART RATE: 98 BPM | DIASTOLIC BLOOD PRESSURE: 78 MMHG | SYSTOLIC BLOOD PRESSURE: 148 MMHG | TEMPERATURE: 99 F | OXYGEN SATURATION: 98 % | WEIGHT: 181.4 LBS

## 2025-05-20 DIAGNOSIS — R42 DIZZINESS: ICD-10-CM

## 2025-05-20 DIAGNOSIS — M54.9 MUSCULOSKELETAL BACK PAIN: Primary | ICD-10-CM

## 2025-05-20 DIAGNOSIS — I10 PRIMARY HYPERTENSION: ICD-10-CM

## 2025-05-20 PROCEDURE — G2211 COMPLEX E/M VISIT ADD ON: HCPCS | Performed by: INTERNAL MEDICINE

## 2025-05-20 PROCEDURE — 99214 OFFICE O/P EST MOD 30 MIN: CPT | Performed by: INTERNAL MEDICINE

## 2025-05-20 RX ORDER — CYCLOBENZAPRINE HCL 10 MG
5 TABLET ORAL
Qty: 15 TABLET | Refills: 0 | Status: SHIPPED | OUTPATIENT
Start: 2025-05-20

## 2025-05-20 NOTE — PROGRESS NOTES
Assessment/Plan:           1. Musculoskeletal back pain  Comments:  Discussed taking Tylenol.  Take ibuprofen and cyclobenzaprine at bedtime.  Orders:  -     cyclobenzaprine (FLEXERIL) 10 mg tablet; Take 0.5 tablets (5 mg total) by mouth daily at bedtime  -     Ambulatory Referral to Physical Therapy; Future  2. Dizziness  Comments:  As needed Dramamine advised.  3. Primary hypertension  Comments:  Stable continue current regimen.         1. Musculoskeletal back pain (Primary)    - cyclobenzaprine (FLEXERIL) 10 mg tablet; Take 0.5 tablets (5 mg total) by mouth daily at bedtime  Dispense: 15 tablet; Refill: 0    2. Dizziness         No problem-specific Assessment & Plan notes found for this encounter.           Subjective:      Patient ID: Rayray Dunn is a 84 y.o. male.    HPI    The following portions of the patient's history were reviewed and updated as appropriate: He  has a past medical history of Arthritis, Basal cell carcinoma, Hypertension, Kidney stone, Skin cancer, and Squamous cell carcinoma of skin.  He   Patient Active Problem List    Diagnosis Date Noted    Rotator cuff strain, left, initial encounter 09/09/2024    Impingement syndrome of left shoulder 01/15/2024    Primary osteoarthritis of left shoulder 01/10/2023    Impingement syndrome of right shoulder 04/25/2022    Primary osteoarthritis of right shoulder 04/25/2022    PVD (peripheral vascular disease) ([facility]) 04/14/2021    Lumbar stenosis with neurogenic claudication     BPH associated with nocturia 12/14/2015    White coat syndrome with diagnosis of hypertension 05/18/2012    Mixed hyperlipidemia 05/18/2012     He  has a past surgical history that includes Kidney stone surgery (1982); Meniscectomy (2000); Tonsillectomy and adenoidectomy (1940); and pr ndsc wrst surg w/rls transvrs carpl ligm (Right, 1/19/2021).  His family history includes Arthritis in his family; Breast cancer in his mother; Cancer in his family; Heart disease in his father;  Hypertension in his family.  He  reports that he has never smoked. He has never used smokeless tobacco. He reports that he does not drink alcohol and does not use drugs.  Current Outpatient Medications   Medication Sig Dispense Refill    acetaminophen (TYLENOL) 650 mg CR tablet Take 1 tablet (650 mg total) by mouth every 8 (eight) hours as needed for mild pain 30 tablet 0    amLODIPine (NORVASC) 5 mg tablet TAKE 1 TABLET BY MOUTH DAILY 90 tablet 1    Cholecalciferol (VITAMIN D3) 32732 units TABS Take 2,000 Units by mouth in the morning.      Coenzyme Q10 (COQ10) 100 MG CAPS Take 200 mg by mouth daily      cyclobenzaprine (FLEXERIL) 10 mg tablet Take 0.5 tablets (5 mg total) by mouth daily at bedtime 15 tablet 0    irbesartan (AVAPRO) 150 mg tablet Take 1 tablet (150 mg total) by mouth daily at bedtime Total 225 mg daily 90 tablet 1    irbesartan (AVAPRO) 75 mg tablet Take 1 tablet (75 mg total) by mouth daily at bedtime Total 225 mg daily 90 tablet 1    multivitamin (THERAGRAN) TABS Take 1 tablet by mouth in the morning.      NON FORMULARY RELIEF FACTOR - ICARIIN 400MG , OMEGA 2800MG, CURCUMIN 1334MG, RESERVATROL 140 MG      Omega-3 Fatty Acids (OMEGA-3 CF) 1000 MG CAPS Take 1 capsule by mouth in the morning.      benzonatate (TESSALON PERLES) 100 mg capsule Take 1 capsule (100 mg total) by mouth 3 (three) times a day as needed for cough (Patient not taking: Reported on 5/20/2025) 20 capsule 0    fluticasone (FLONASE) 50 mcg/act nasal spray 1 spray into each nostril daily (Patient not taking: Reported on 5/20/2025) 5 mL 0    hydrocortisone 1 % cream Apply topically 3 (three) times a day (Patient not taking: Reported on 3/27/2025) 100 g 3    ofloxacin (FLOXIN) 0.3 % otic solution Administer 10 drops into the left ear 2 (two) times a day (Patient not taking: Reported on 3/27/2025) 15 mL 0    triamcinolone (KENALOG) 0.1 % cream  (Patient not taking: Reported on 9/23/2024)       No current facility-administered  medications for this visit.     Current Outpatient Medications on File Prior to Visit   Medication Sig    acetaminophen (TYLENOL) 650 mg CR tablet Take 1 tablet (650 mg total) by mouth every 8 (eight) hours as needed for mild pain    amLODIPine (NORVASC) 5 mg tablet TAKE 1 TABLET BY MOUTH DAILY    Cholecalciferol (VITAMIN D3) 86451 units TABS Take 2,000 Units by mouth in the morning.    Coenzyme Q10 (COQ10) 100 MG CAPS Take 200 mg by mouth daily    irbesartan (AVAPRO) 150 mg tablet Take 1 tablet (150 mg total) by mouth daily at bedtime Total 225 mg daily    irbesartan (AVAPRO) 75 mg tablet Take 1 tablet (75 mg total) by mouth daily at bedtime Total 225 mg daily    multivitamin (THERAGRAN) TABS Take 1 tablet by mouth in the morning.    NON FORMULARY RELIEF FACTOR - ICARIIN 400MG , OMEGA 2800MG, CURCUMIN 1334MG, RESERVATROL 140 MG    Omega-3 Fatty Acids (OMEGA-3 CF) 1000 MG CAPS Take 1 capsule by mouth in the morning.    benzonatate (TESSALON PERLES) 100 mg capsule Take 1 capsule (100 mg total) by mouth 3 (three) times a day as needed for cough (Patient not taking: Reported on 5/20/2025)    fluticasone (FLONASE) 50 mcg/act nasal spray 1 spray into each nostril daily (Patient not taking: Reported on 5/20/2025)    hydrocortisone 1 % cream Apply topically 3 (three) times a day (Patient not taking: Reported on 3/27/2025)    ofloxacin (FLOXIN) 0.3 % otic solution Administer 10 drops into the left ear 2 (two) times a day (Patient not taking: Reported on 3/27/2025)    triamcinolone (KENALOG) 0.1 % cream  (Patient not taking: Reported on 9/23/2024)     No current facility-administered medications on file prior to visit.     There are no discontinued medications.   He is allergic to sulfate..    Review of Systems   Constitutional:  Negative for appetite change, chills, fatigue and fever.   HENT:  Negative for sore throat and trouble swallowing.    Eyes:  Negative for redness.   Respiratory:  Negative for shortness of breath.   "  Cardiovascular:  Negative for chest pain and palpitations.   Gastrointestinal:  Negative for abdominal pain, constipation and diarrhea.   Genitourinary:  Negative for dysuria and hematuria.   Musculoskeletal:  Positive for back pain. Negative for neck pain.   Skin:  Negative for rash.   Neurological:  Positive for dizziness. Negative for seizures, weakness and headaches.   Hematological:  Negative for adenopathy.   Psychiatric/Behavioral:  Negative for confusion. The patient is not nervous/anxious.          Objective:      /78 (BP Location: Left arm, Patient Position: Sitting, Cuff Size: Standard)   Pulse 98   Temp 99 °F (37.2 °C) (Temporal)   Ht 5' 6\" (1.676 m)   Wt 82.3 kg (181 lb 6.4 oz)   SpO2 98%   BMI 29.28 kg/m²     Results Reviewed       None            Recent Results (from the past 8 weeks)   POCT Rapid Covid Ag    Collection Time: 03/27/25  1:42 PM   Result Value Ref Range    POCT SARS-CoV-2 Ag Positive (A) Negative    VALID CONTROL Valid         Physical Exam  Constitutional:       Appearance: Normal appearance. He is normal weight.   HENT:      Head: Normocephalic and atraumatic.      Nose: Nose normal.      Mouth/Throat:      Mouth: Mucous membranes are moist.     Eyes:      Extraocular Movements: Extraocular movements intact.      Pupils: Pupils are equal, round, and reactive to light.     Pulmonary:      Effort: Pulmonary effort is normal.   Abdominal:      Palpations: Abdomen is soft.     Musculoskeletal:         General: Tenderness present. Normal range of motion.      Cervical back: Normal range of motion and neck supple.     Neurological:      General: No focal deficit present.      Mental Status: He is alert and oriented to person, place, and time. Mental status is at baseline.         "

## 2025-05-22 ENCOUNTER — TELEPHONE (OUTPATIENT)
Age: 85
End: 2025-05-22

## 2025-05-22 DIAGNOSIS — M54.9 MUSCULOSKELETAL BACK PAIN: Primary | ICD-10-CM

## 2025-05-22 NOTE — TELEPHONE ENCOUNTER
Patient called and said he was recently seen in office on 5/20 for butt/back pain. Patient was prescribed Cyclobenzaprine HCl 5 mg. Patient said this is not helping and the pain is actually getting worse. Patient said he's been taking tylenol/motrin off and on and that is not helping either. Patient is asking for a referral to Orthopedics if Dr. Mendieta would recommend.     Please review and advise further

## 2025-05-23 ENCOUNTER — APPOINTMENT (OUTPATIENT)
Dept: RADIOLOGY | Age: 85
End: 2025-05-23
Payer: MEDICARE

## 2025-05-23 ENCOUNTER — CONSULT (OUTPATIENT)
Dept: PAIN MEDICINE | Facility: CLINIC | Age: 85
End: 2025-05-23

## 2025-05-23 VITALS — HEIGHT: 66 IN | BODY MASS INDEX: 29.09 KG/M2 | WEIGHT: 181 LBS

## 2025-05-23 DIAGNOSIS — M51.16 LUMBAR DISC DISEASE WITH RADICULOPATHY: ICD-10-CM

## 2025-05-23 DIAGNOSIS — M51.16 LUMBAR DISC DISEASE WITH RADICULOPATHY: Primary | ICD-10-CM

## 2025-05-23 PROCEDURE — 72114 X-RAY EXAM L-S SPINE BENDING: CPT

## 2025-05-23 RX ORDER — METHYLPREDNISOLONE 4 MG/1
TABLET ORAL
Qty: 21 EACH | Refills: 0 | Status: SHIPPED | OUTPATIENT
Start: 2025-05-23

## 2025-05-23 NOTE — PROGRESS NOTES
Name: Rayray Dunn      : 1940      MRN: [id number]  Encounter Provider: Brant Esteban DO  Encounter Date: 2025   Encounter department: [facility] [place]  :  Assessment & Plan  Lumbar disc disease with radiculopathy    Orders:    methylPREDNISolone 4 MG tablet therapy pack; Use as directed on package    Ambulatory referral to Physical Therapy; Future    XR spine lumbar complete w bending minimum 6 views; Future    Rayray presents today for follow-up regarding his acute low back and left leg pain.  Does appear that his pain is consistent with radiculopathy.  I reviewed his lumbar MRI from 2019 multilevel degenerative changes with foraminal and canal stenosis.  I have provided him with a Medrol Dosepak to help with the acute inflammatory response.  He is scheduled to initiate his physical therapy program.  I am hopeful that this acute flare will be self-limiting.  I recommend following completion of the Medrol Dosepak he continue with Tylenol and ibuprofen alternating.  Additionally he has a prescription for cyclobenzaprine which I think is reasonable.    I will have him follow-up in 4 weeks see how he is progressing with conservative care.  I have ordered him a lumbar radiograph to evaluate for new changes in the lumbar spine.  We will review the results of his lumbar radiograph as well as see how he is progressing with conservative care.  If failure to improve, we may need to consider obtaining a repeat MRI and discuss potential interventional treatment options.        My impressions and treatment recommendations were discussed in detail with the patient who verbalized understanding and had no further questions.  Discharge instructions were provided. I personally saw and examined the patient and I agree with the above discussed plan of care.    History of Present Illness     Rayray Dunn is a 84 y.o. male who presents today for evaluation management of low back and left  "leg pain has been present for roughly 1 week he denies any specific inciting event.  Pain is rated as severe and currently 10 out of 10.  The pain is nearly constant associated with shooting and sharpness.  Pain is located in the lumbosacral area radiating down the posterior aspect of the left thigh and leg.  Pain is made worse with all activities.  He is currently using over-the-counter analgesics and muscle relaxants without any relief.   He uses heat and ice with moderate relief.  He presents today for further evaluation.    Review of Systems   Constitutional:  Negative for fever and unexpected weight change.   HENT:  Negative for trouble swallowing.    Eyes:  Negative for visual disturbance.   Respiratory:  Negative for chest tightness, shortness of breath and wheezing.    Cardiovascular:  Negative for chest pain and palpitations.   Gastrointestinal:  Negative for constipation, diarrhea, nausea and vomiting.   Endocrine: Negative for cold intolerance, heat intolerance and polydipsia.   Genitourinary:  Negative for difficulty urinating and frequency.   Musculoskeletal:  Positive for back pain and gait problem. Negative for arthralgias, joint swelling, myalgias, neck pain and neck stiffness.   Skin:  Negative for rash.   Neurological:  Positive for weakness. Negative for dizziness, seizures, syncope, numbness and headaches.   Hematological:  Does not bruise/bleed easily.   Psychiatric/Behavioral:  Positive for sleep disturbance. Negative for dysphoric mood.    All other systems reviewed and are negative.    Medical History Reviewed by provider this encounter:     .       Objective   Ht 5' 6\" (1.676 m)   Wt 82.1 kg (181 lb)   BMI 29.21 kg/m²      Pain Score:   8    Physical Exam    Constitutional:normal, well developed, well nourished, alert, in no distress and non-toxic and no overt pain behavior.  Eyes:anicteric  HEENT:grossly intact  Neck:supple, symmetric, trachea midline and no masses   Pulmonary:even and " unlabored  Cardiovascular:No edema or pitting edema present  Skin:Normal without rashes or lesions and well hydrated  Psychiatric:Mood and affect appropriate  Neurologic:Cranial Nerves II-XII grossly intact  Musculoskeletal: Range of motion lumbar spine limited secondary to pain.  Tenderness in lumbar paraspinals with positive facet loading.  Positive straight leg raise on the left.  Tenderness over the greater trochanteric bursa on the left.  No provocation with Katelynn's.  Strength preserved in lower limbs.

## 2025-05-27 ENCOUNTER — TELEPHONE (OUTPATIENT)
Age: 85
End: 2025-05-27

## 2025-05-27 DIAGNOSIS — M54.16 LUMBAR RADICULOPATHY: Primary | ICD-10-CM

## 2025-05-27 DIAGNOSIS — F41.9 ANXIETY DUE TO INVASIVE PROCEDURE: ICD-10-CM

## 2025-05-27 NOTE — TELEPHONE ENCOUNTER
Caller: opal Seo    Doctor: Dr. Esteban    Reason for call: pt called stating the dr put him on methylprednisone and he has not noticed any improvement in his pain    Please advise    Call back#: [phone]

## 2025-05-27 NOTE — TELEPHONE ENCOUNTER
S/W pt and advised the same  Starting PT tomorrow  Advised to try and get through the 6 weeks to be able to do the MRI, but if they consider him failing, to call us back and advise this  CB with questions or concerns

## 2025-05-27 NOTE — TELEPHONE ENCOUNTER
S/W pt who is on day 4 of MDP  States not much help yet  Advised pt to get through full pack and then wait a couple of days to see if there is improvement  Update us next week    Pt also would like to know his Xray results he had done on 5/23

## 2025-05-28 ENCOUNTER — EVALUATION (OUTPATIENT)
Dept: PHYSICAL THERAPY | Facility: REHABILITATION | Age: 85
End: 2025-05-28
Attending: INTERNAL MEDICINE
Payer: MEDICARE

## 2025-05-28 DIAGNOSIS — M54.16 LUMBAR RADICULOPATHY: Primary | ICD-10-CM

## 2025-05-28 PROCEDURE — 97162 PT EVAL MOD COMPLEX 30 MIN: CPT | Performed by: PHYSICAL THERAPIST

## 2025-05-28 PROCEDURE — 97110 THERAPEUTIC EXERCISES: CPT | Performed by: PHYSICAL THERAPIST

## 2025-05-28 NOTE — HOME EXERCISE EDUCATION
Program_ID:[id number]   Access Code: [email or web address]  URL: [email or web address]  Date: 05-  Prepared By: Timothy Polo    Program Notes      Exercises      - Hooklying Single Knee to Chest Stretch - 2 x daily - 7 x weekly -  sets - 10 reps - 10&#34; hold      - Supine Piriformis Stretch with Foot on Ground - 2 x daily - 7 x weekly -  sets - 10 reps - 10&#34; hold      - Supine Lower Trunk Rotation - 2 x daily - 7 x weekly -  sets - 10 reps - 10&#34; hold      - Supine Transversus Abdominis Bracing - Hands on Stomach - 2 x daily - 7 x weekly -  sets - 10 reps - 5&#34; hold      - Seated Lumbar Flexion Stretch - 2 x daily - 7 x weekly -  sets - 10 reps - 10&#34; hold

## 2025-05-28 NOTE — PROGRESS NOTES
PT Evaluation     Today's date: 2025  Patient name: Rayray Dunn  : 1940  MRN: [id number]  Referring provider: Favian Mendieta MD  Dx:   Encounter Diagnosis     ICD-10-CM    1. Lumbar radiculopathy  M54.16                      Assessment  Impairments: abnormal or restricted ROM, activity intolerance, impaired physical strength, lacks appropriate home exercise program, pain with function, poor posture , poor body mechanics and activity limitations  Other impairment: impaired LE flexibility    Assessment details: Patient presents with pain, decreased lumbar ROM, decreased hip/core strength, and decreased function secondary to lumbar radiculopathy.  Patient would benefit from skilled PT intervention to address these issues and to maximize function.  Thank you for the referral.  Understanding of Dx/Px/POC: good     Prognosis: good    Goals  Short Term:  Pt will report decreased levels of pain by at least 2 subjective ratings in 4 weeks  Pt will demonstrate improved ROM by at least 25% in 4 weeks  Pt will demonstrate improved strength by 1/2 grade MMT in 4 weeks  Long Term:   Pt will be independent in their HEP in 8 weeks  Pt will demonstrate improved FOTO, > predicted level  Pt will be independent with all ADL's  Pt will perform household activities at prior level of function  Pt will ambulate community distances without pain    Plan  Patient would benefit from: skilled physical therapy    Planned therapy interventions: abdominal trunk stabilization, body mechanics training, joint mobilization, manual therapy, neuromuscular re-education, nerve gliding, patient/caregiver education, postural training, strengthening, stretching, therapeutic activities, therapeutic exercise, transfer training, flexibility, graded exercise and home exercise program    Frequency: 2x week  Duration in weeks: 8  Plan of Care beginning date: 2025  Plan of Care expiration date: 2025  Treatment plan discussed with:  patient  Plan details: Patient was educated in HEP and Plan of Care.  All questions were answered to pt's satisfaction.     Subjective Evaluation    History of Present Illness  Mechanism of injury: Pt is an 84 y.o male with a c/o ongoing lumbar pain of insidious onset for about the last 10 days.  Pt states his pain has started to radiate to anterolateral L knee, as well as n/t in that region.  Pt saw PCP initially, was prescribed muscle relaxer and instructed to take tylenol.  Pt then saw Pain Management and was placed on oral dose of tapering steroids.  Pt does not feel much benefit from any of the medicines.  Pt is now referred for PT.  Pt reports pain/difficulty with standing (limited tolerance), ambulation (has started using RW recently), showering, household activities (not performing anything recently), sleep (unable to lay flat and has been sleeping in recliner).  Pt notes relief with sitting.  Pt notes hx of low back pain in the past in which he received injection about 7 years ago but believes it was R sided.    Patient Goals  Patient goals for therapy: decreased pain, independence with ADLs/IADLs and return to sport/leisure activities    Pain  Current pain ratin  At best pain ratin  At worst pain rating: 10  Location: left lumbar spine and anterolateral L LE to knee.  Alleviating factors: sitting.      Diagnostic Tests  X-ray: abnormal  Treatments  Current treatment: medication      Objective     Concurrent Complaints  Negative for bladder dysfunction, bowel dysfunction and saddle (S4) numbness    Additional Special Questions  Pt denies unexplained weight loss.      Neurological Testing     Reflexes   Left   Patellar (L4): normal (2+)  Achilles (S1): normal (2+)  Clonus sign: negative    Right   Patellar (L4): normal (2+)  Achilles (S1): normal (2+)  Clonus sign: negative    Additional Neurological Details  Pt reports constant n/t L anterolateral thigh.      Active Range of Motion     Additional  Active Range of Motion Details  L/S AROM (% of normal):  Flex= wfl with lumbar stretching; repeated=L anterior thigh pain with return to neutral  Ext= 50% with L LE pain; repeated=increased pain  RSB=75% without pain  LSB=25% with L LE pain  R rot=50% without pain  L rot= 25% with L LE pain      Strength/Myotome Testing     Left Hip   Planes of Motion   Flexion: 4- (pain)  Abduction: 4-  External rotation: 4+    Right Hip   Planes of Motion   Flexion: 4+  Abduction: 5  External rotation: 5    Left Knee   Flexion: 5  Extension: 5    Right Knee   Flexion: 5  Extension: 5    Left Ankle/Foot   Dorsiflexion: 5  Plantar flexion: 5  Great toe extension: 5    Right Ankle/Foot   Dorsiflexion: 5  Plantar flexion: 5  Great toe extension: 5    Tests     Lumbar     Left   Positive femoral stretch and slump test.   Negative crossed SLR and passive SLR.     Right   Negative crossed SLR, passive SLR and slump test.     Left Pelvic Girdle/Sacrum   Positive: active SLR test.     Right Pelvic Girdle/Sacrum   Negative: active SLR test.     Left Hip   Negative JOI.     Right Hip   Negative JOI.     Additional Tests Details  Pt unable to lay prone due to pain.    Decreased piriformis flexibility noted on L LE, as well as hip flexor tightness.   L LAD=some relief            Precautions: OA, HTN, hx of skin cancer  POC expires Unit limit Auth Expiration date PT/OT + Visit Limit?   7/25 NA NA BOMN         Visit/Unit Tracking  AUTH Status:  Date 5/28        RE every 10V Used 1         Remaining  9           Pertinent Findings:                                                                                            Test / Measure  5/28   FOTO (Predicted 54) 38                       Manuals 5/28            L LAD             L L/S opening JM in R S/L             L femoral nerve glides in R S/L                          Neuro Re-Ed             Standing pball crush             Seated TA w/LPD             Seated TA w/rows             Seated  multifidus press                                                    Ther Ex             NS             Seated lumbar flexion w/pball             Standing L side glides at wall             SKTC             TA w/hip add squeeze             HL clamshell w/TB             TA w/march             TA w/bridges             Pt education+ HEP instruction TP 8 mins            Ther Activity             TA w/squats             Standing alt marching             Gait Training                                       Modalities

## 2025-05-30 NOTE — TELEPHONE ENCOUNTER
Order placed for repeat lumbar MRI to further evaluate for changes.  Will need to have this scheduled.  Thank you

## 2025-05-30 NOTE — TELEPHONE ENCOUNTER
Caller: Rayray    Doctor: Dr. Esteban     Reason for call: starting taking Prednisone 4mg, and stopped taking flexeril, can he take advil? He has no change in pain 10/10 when he gets up and stands he has been living in a lounge chair for the past 2 weeks he can't do that anymore what do you recommend?    Call back#: [phone]

## 2025-05-30 NOTE — TELEPHONE ENCOUNTER
S/w pt who reports that he has has 0% relief with the prednisone and would like to know next steps. Can MRI be ordered?    Pt still has left lower back pain that radiates to right thigh to the knee.     Pt advised may still yet get some relief from oral steroid, ok to resume Advil and flexeril. Pt declines flexeril a sit never really helped him.    Please advise

## 2025-06-01 DIAGNOSIS — I10 ESSENTIAL HYPERTENSION: ICD-10-CM

## 2025-06-01 RX ORDER — AMLODIPINE BESYLATE 5 MG/1
5 TABLET ORAL DAILY
Qty: 90 TABLET | Refills: 3 | Status: SHIPPED | OUTPATIENT
Start: 2025-06-01

## 2025-06-02 ENCOUNTER — TELEPHONE (OUTPATIENT)
Age: 85
End: 2025-06-02

## 2025-06-02 NOTE — TELEPHONE ENCOUNTER
Caller: Patient     Doctor: Dr. Galindo    Reason for call: Patient has to schedule an MRI of his back and is asking if Dr. Galindo would like to order an MRI of his left shoulder so that he can get both of them done at the same time. He stated this was discussed at the last office visit.    Call back#: [phone]

## 2025-06-02 NOTE — TELEPHONE ENCOUNTER
Caller: opal Seo     Doctor: Dr. Esteban     Reason for call: Pt stated that he has been in pain for about 17 days now its becoming difficult for him to walk and stand, the steroid and tPT is not helping.     Pt would like to know what is going on with his back, and the left leg is numb, pt has been taking Advil and tylenol. PT would like a call back.      Call back#: [phone]

## 2025-06-02 NOTE — TELEPHONE ENCOUNTER
S/W pt.  Pt reported walking and standing has been difficult. Pt received three hours of sleep last night, which is the most he has gotten for some time.  Pt has been finished prednisone pack for days now.  Pt currently taking Advil and tylenol for pain and will be picking up Voltaren topical cream to try.    Advised MRI is ordered and provided Central scheduling number [phone].  Pt appreciative of call and will be scheduling MRI.

## 2025-06-02 NOTE — TELEPHONE ENCOUNTER
Caller: Rayray     Doctor: Dr. Esteban     Reason for call: Patient calling asking if he should cancel PT appointment since he is in pain and getting MRI done please advise     Call back#: [phone]

## 2025-06-02 NOTE — TELEPHONE ENCOUNTER
Brant Esteban, DO to [facility]  (Selected Message)  RS      6/2/25 11:35 AM  Yes okay to cancel.  Thank you

## 2025-06-02 NOTE — TELEPHONE ENCOUNTER
Spoke to patient. Currently having no pain in his shoulder and thus he would like to hold off an shoulder MRI for now.

## 2025-06-03 RX ORDER — TIZANIDINE 2 MG/1
2 TABLET ORAL EVERY 8 HOURS PRN
Qty: 90 TABLET | Refills: 1 | Status: SHIPPED | OUTPATIENT
Start: 2025-06-03

## 2025-06-03 NOTE — TELEPHONE ENCOUNTER
Caller: Rayray    Doctor: Dr. Esteban    Reason for call: pt is calling with back pain,left thigh . Pain level 04/10. Pt scheduled for the MRI 06/10   pt is taking Advil and tylenol. He is asking if there is something else he can take. He also is asking if he should take the pain med before his MRI due to pain while laying down. Please advise pt     Call back#: [phone]

## 2025-06-03 NOTE — TELEPHONE ENCOUNTER
Provided him with a prescription for tizanidine to be taken up to 3 times a day.  He can take this in the meantime.  Hopefully this will help with the symptoms.

## 2025-06-06 RX ORDER — LORAZEPAM 0.5 MG/1
0.5 TABLET ORAL ONCE
Qty: 1 TABLET | Refills: 0 | Status: SHIPPED | OUTPATIENT
Start: 2025-06-06 | End: 2025-06-06

## 2025-06-06 NOTE — TELEPHONE ENCOUNTER
Caller: Rayray    Doctor: Dr. Esteban     Reason for call: pt is calling regarding the MRI he has set up for 06/10. He states he isn't sure he can lay there for the MRI due to the level of pain he has. Pain level 8/10. Pt is asking for a stronger med   They mention doing an MRI with sedation. He states he really doesn't want to do that. Please advise pt     Call back#: [phone]

## 2025-06-10 ENCOUNTER — HOSPITAL ENCOUNTER (OUTPATIENT)
Dept: MRI IMAGING | Facility: HOSPITAL | Age: 85
Discharge: HOME | End: 2025-06-10
Attending: STUDENT IN AN ORGANIZED HEALTH CARE EDUCATION/TRAINING PROGRAM
Payer: MEDICARE

## 2025-06-10 DIAGNOSIS — M54.16 LUMBAR RADICULOPATHY: ICD-10-CM

## 2025-06-10 PROCEDURE — 72148 MRI LUMBAR SPINE W/O DYE: CPT

## 2025-06-12 ENCOUNTER — RESULTS FOLLOW-UP (OUTPATIENT)
Age: 85
End: 2025-06-12

## 2025-06-12 NOTE — TELEPHONE ENCOUNTER
Called Rayray to discuss the results of his lumbar MRI showing anterolisthesis of L4 on L5 with severe canal and foraminal stenosis at this level.  Given failure to improve with conservative measures, I do think he would benefit from a lumbar epidural steroid injection L5-S1.  Risks of the procedure discussed with the patient including bleeding, bruising, infection, potential injury to neurovascular structures, and lack of efficacy.  Patient understands these risks and is willing to proceed.    Will have him scheduled for an L5-S1 epidural steroid injection.

## 2025-06-20 ENCOUNTER — OFFICE VISIT (OUTPATIENT)
Dept: PAIN MEDICINE | Facility: CLINIC | Age: 85
End: 2025-06-20
Payer: MEDICARE

## 2025-06-20 VITALS — HEIGHT: 66 IN | BODY MASS INDEX: 29.57 KG/M2 | WEIGHT: 184 LBS

## 2025-06-20 DIAGNOSIS — M48.062 SPINAL STENOSIS OF LUMBAR REGION WITH NEUROGENIC CLAUDICATION: Primary | ICD-10-CM

## 2025-06-20 DIAGNOSIS — M51.16 LUMBAR DISC DISEASE WITH RADICULOPATHY: ICD-10-CM

## 2025-06-20 PROCEDURE — 99214 OFFICE O/P EST MOD 30 MIN: CPT | Performed by: STUDENT IN AN ORGANIZED HEALTH CARE EDUCATION/TRAINING PROGRAM

## 2025-06-20 PROCEDURE — G2211 COMPLEX E/M VISIT ADD ON: HCPCS | Performed by: STUDENT IN AN ORGANIZED HEALTH CARE EDUCATION/TRAINING PROGRAM

## 2025-06-20 NOTE — PROGRESS NOTES
Name: Rayray Dunn      : 1940      MRN: [id number]  Encounter Provider: Brant Esteban DO  Encounter Date: 2025   Encounter department: [facility] [place]  :  Assessment & Plan  Spinal stenosis of lumbar region with neurogenic claudication         Lumbar disc disease with radiculopathy         Rayray presents today for follow-up regarding his low back and left greater than right leg pain.  I independently interpreted his lumbar MRI showing multilevel spondylosis with disc desiccation and disc space loss with ligamentum flavum hypertrophy and varying degrees of spinal canal and foraminal stenosis most severe at L4-5 causing marked central canal stenosis.  His symptoms are consistent with lumbar spinal stenosis with neurogenic claudication.  He is scheduled for an L5-S1 epidural steroid injection to reduce inflammation along the nerve roots.  We discussed expected management with an epidural steroid injection.  We did discuss that if his symptoms do not improve that he may need to consider surgical options however decision needs to be weighed given his age and comorbidities.  We did discuss that if he is not a surgical candidate we could consider minimally invasive lumbar decompression.    Complete risks and benefits including bleeding, infection, tissue reaction, nerve injury and allergic reaction were discussed. The approach was demonstrated using models and literature was provided. Verbal and written consent was obtained.    My impressions and treatment recommendations were discussed in detail with the patient who verbalized understanding and had no further questions.  Discharge instructions were provided. I personally saw and examined the patient and I agree with the above discussed plan of care.    History of Present Illness     Rayray Dunn is a 84 y.o. male who presents for a follow up office visit in regards to Back Pain and Leg Pain (Left upper leg/Numbness/pain).      Rayray presents today for follow-up regarding his low back pain.  He he has been taking his multimodal analgesic regiment which has has made some improvements with the home exercises learned in physical therapy.  He has been taking his multimodal analgesic regiment which has been helpful however his pain still persist.  His  limitations are mostly in standing and walking.  His pain is located in the lumbosacral area radiating down the posterior lateral aspect of the left leg associated with numbness and paresthesias.    Opioid contract date    Last UDS Date: No results in last 5 years                    last taken on    Review of Systems   Respiratory:  Negative for chest tightness and shortness of breath.    Cardiovascular:  Negative for chest pain.   Gastrointestinal:  Negative for constipation, diarrhea, nausea and vomiting.   Musculoskeletal:  Positive for back pain and gait problem. Negative for arthralgias, joint swelling, myalgias, neck pain and neck stiffness.   Skin:  Negative for rash.   Neurological:  Positive for weakness and numbness. Negative for dizziness, seizures, light-headedness and headaches.   Psychiatric/Behavioral:  Positive for sleep disturbance.    All other systems reviewed and are negative.    Medical History Reviewed by provider this encounter:     .  Past Medical History   Past Medical History[1]  Past Surgical History[2]  Family History[3]   reports that he has never smoked. He has never used smokeless tobacco. He reports that he does not drink alcohol and does not use drugs.  Current Outpatient Medications   Medication Instructions    acetaminophen (TYLENOL) 650 mg, Oral, Every 8 hours PRN    amLODIPine (NORVASC) 5 mg, Oral, Daily    benzonatate (TESSALON PERLES) 100 mg, Oral, 3 times daily PRN    CoQ10 200 mg, Daily    cyclobenzaprine (FLEXERIL) 5 mg, Oral, Daily at bedtime    fluticasone (FLONASE) 50 mcg/act nasal spray 1 spray, Nasal, Daily    hydrocortisone 1 % cream Topical,  "3 times daily    irbesartan (AVAPRO) 150 mg, Oral, Daily at bedtime, Total 225 mg daily    irbesartan (AVAPRO) 75 mg, Oral, Daily at bedtime, Total 225 mg daily    LORazepam (ATIVAN) 0.5 mg, Oral, Once    methylPREDNISolone 4 MG tablet therapy pack Use as directed on package    multivitamin (THERAGRAN) TABS 1 tablet, Daily    NON FORMULARY RELIEF FACTOR - ICARIIN 400MG , OMEGA 2800MG, CURCUMIN 1334MG, RESERVATROL 140 MG    ofloxacin (FLOXIN) 0.3 % otic solution 10 drops, Left Ear, 2 times daily    Omega-3 Fatty Acids (OMEGA-3 CF) 1000 MG CAPS 1 capsule, Daily    tiZANidine (ZANAFLEX) 2 mg, Oral, Every 8 hours PRN    triamcinolone (KENALOG) 0.1 % cream     Vitamin D3 2,000 Units, Daily   Allergies[4]   Medications Ordered Prior to Encounter[5]   Social History[6]     Objective   Ht 5' 6\" (1.676 m)   Wt 83.5 kg (184 lb)   BMI 29.70 kg/m²      Pain Score:   7  Physical Exam  Constitutional: normal, well developed, well nourished, alert, in no distress and non-toxic and no overt pain behavior.  Eyes: anicteric  HEENT: grossly intact  Neck: supple, symmetric, trachea midline and no masses   Pulmonary: even and unlabored  Cardiovascular: No edema or pitting edema present  Skin: Normal without rashes or lesions and well hydrated  Psychiatric: Mood and affect appropriate  Neurologic: Cranial Nerves II-XII grossly intact  Musculoskeletal: Range of motion lumbar spine is limited with extension.  Tenderness lumbar paraspinals.  Positive straight leg raise on the left.  Strength preserved in lower limbs.      Administrative Statements   I have spent a total time of 31 minutes in caring for this patient on the day of the visit/encounter including Diagnostic results, Prognosis, Risks and benefits of tx options, Instructions for management, Impressions, Documenting in the medical record, Reviewing/placing orders in the medical record (including tests, medications, and/or procedures), and Obtaining or reviewing history  .   "     [1]   Past Medical History:  Diagnosis Date    Arthritis     Basal cell carcinoma     Hypertension     Kidney stone     Skin cancer     Squamous cell carcinoma of skin    [2]   Past Surgical History:  Procedure Laterality Date    KIDNEY STONE SURGERY  1982    MENISCECTOMY  2000    NE NDSC WRST SURG W/RLS TRANSVRS CARPL LIGM Right 1/19/2021    Procedure: RELEASE CARPAL TUNNEL ENDOSCOPIC;  Surgeon: Tyshawn Vega MD;  Location: [facility];  Service: Orthopedics    TONSILLECTOMY AND ADENOIDECTOMY  1940   [3]   Family History  Problem Relation Name Age of Onset    Arthritis Family      Hypertension Family      Cancer Family      Breast cancer Mother      Heart disease Father     [4]   Allergies  Allergen Reactions    Sulfate    [5]   Current Outpatient Medications on File Prior to Visit   Medication Sig Dispense Refill    acetaminophen (TYLENOL) 650 mg CR tablet Take 1 tablet (650 mg total) by mouth every 8 (eight) hours as needed for mild pain 30 tablet 0    amLODIPine (NORVASC) 5 mg tablet TAKE 1 TABLET BY MOUTH DAILY 90 tablet 3    Cholecalciferol (VITAMIN D3) 63949 units TABS Take 2,000 Units by mouth in the morning.      Coenzyme Q10 (COQ10) 100 MG CAPS Take 200 mg by mouth daily      cyclobenzaprine (FLEXERIL) 10 mg tablet Take 0.5 tablets (5 mg total) by mouth daily at bedtime 15 tablet 0    irbesartan (AVAPRO) 150 mg tablet Take 1 tablet (150 mg total) by mouth daily at bedtime Total 225 mg daily 90 tablet 1    irbesartan (AVAPRO) 75 mg tablet Take 1 tablet (75 mg total) by mouth daily at bedtime Total 225 mg daily 90 tablet 1    methylPREDNISolone 4 MG tablet therapy pack Use as directed on package 21 each 0    multivitamin (THERAGRAN) TABS Take 1 tablet by mouth in the morning.      NON FORMULARY RELIEF FACTOR - ICARIIN 400MG , OMEGA 2800MG, CURCUMIN 1334MG, RESERVATROL 140 MG      Omega-3 Fatty Acids (OMEGA-3 CF) 1000 MG CAPS Take 1 capsule by mouth in the morning.      benzonatate (TESSALON PERLES)  100 mg capsule Take 1 capsule (100 mg total) by mouth 3 (three) times a day as needed for cough (Patient not taking: Reported on 5/20/2025) 20 capsule 0    fluticasone (FLONASE) 50 mcg/act nasal spray 1 spray into each nostril daily (Patient not taking: Reported on 5/20/2025) 5 mL 0    hydrocortisone 1 % cream Apply topically 3 (three) times a day (Patient not taking: Reported on 3/27/2025) 100 g 3    LORazepam (Ativan) 0.5 mg tablet Take 1 tablet (0.5 mg total) by mouth 1 (one) time for 1 dose 1 tablet 0    ofloxacin (FLOXIN) 0.3 % otic solution Administer 10 drops into the left ear 2 (two) times a day (Patient not taking: Reported on 3/27/2025) 15 mL 0    tiZANidine (ZANAFLEX) 2 mg tablet Take 1 tablet (2 mg total) by mouth every 8 (eight) hours as needed for muscle spasms (Patient not taking: Reported on 6/20/2025) 90 tablet 1    triamcinolone (KENALOG) 0.1 % cream  (Patient not taking: Reported on 5/23/2025)       No current facility-administered medications on file prior to visit.   [6]   Social History  Tobacco Use    Smoking status: Never    Smokeless tobacco: Never   Vaping Use    Vaping status: Never Used   Substance and Sexual Activity    Alcohol use: Never    Drug use: Never    Sexual activity: Not Currently

## 2025-06-20 NOTE — H&P (VIEW-ONLY)
Name: Rayray Dunn      : 1940      MRN: [id number]  Encounter Provider: Brant Esteban DO  Encounter Date: 2025   Encounter department: [facility] [place]  :  Assessment & Plan  Spinal stenosis of lumbar region with neurogenic claudication         Lumbar disc disease with radiculopathy         Rayray presents today for follow-up regarding his low back and left greater than right leg pain.  I independently interpreted his lumbar MRI showing multilevel spondylosis with disc desiccation and disc space loss with ligamentum flavum hypertrophy and varying degrees of spinal canal and foraminal stenosis most severe at L4-5 causing marked central canal stenosis.  His symptoms are consistent with lumbar spinal stenosis with neurogenic claudication.  He is scheduled for an L5-S1 epidural steroid injection to reduce inflammation along the nerve roots.  We discussed expected management with an epidural steroid injection.  We did discuss that if his symptoms do not improve that he may need to consider surgical options however decision needs to be weighed given his age and comorbidities.  We did discuss that if he is not a surgical candidate we could consider minimally invasive lumbar decompression.    Complete risks and benefits including bleeding, infection, tissue reaction, nerve injury and allergic reaction were discussed. The approach was demonstrated using models and literature was provided. Verbal and written consent was obtained.    My impressions and treatment recommendations were discussed in detail with the patient who verbalized understanding and had no further questions.  Discharge instructions were provided. I personally saw and examined the patient and I agree with the above discussed plan of care.    History of Present Illness     Rayray Dunn is a 84 y.o. male who presents for a follow up office visit in regards to Back Pain and Leg Pain (Left upper leg/Numbness/pain).      Rayray presents today for follow-up regarding his low back pain.  He he has been taking his multimodal analgesic regiment which has has made some improvements with the home exercises learned in physical therapy.  He has been taking his multimodal analgesic regiment which has been helpful however his pain still persist.  His  limitations are mostly in standing and walking.  His pain is located in the lumbosacral area radiating down the posterior lateral aspect of the left leg associated with numbness and paresthesias.    Opioid contract date    Last UDS Date: No results in last 5 years                    last taken on    Review of Systems   Respiratory:  Negative for chest tightness and shortness of breath.    Cardiovascular:  Negative for chest pain.   Gastrointestinal:  Negative for constipation, diarrhea, nausea and vomiting.   Musculoskeletal:  Positive for back pain and gait problem. Negative for arthralgias, joint swelling, myalgias, neck pain and neck stiffness.   Skin:  Negative for rash.   Neurological:  Positive for weakness and numbness. Negative for dizziness, seizures, light-headedness and headaches.   Psychiatric/Behavioral:  Positive for sleep disturbance.    All other systems reviewed and are negative.    Medical History Reviewed by provider this encounter:     .  Past Medical History   Past Medical History[1]  Past Surgical History[2]  Family History[3]   reports that he has never smoked. He has never used smokeless tobacco. He reports that he does not drink alcohol and does not use drugs.  Current Outpatient Medications   Medication Instructions    acetaminophen (TYLENOL) 650 mg, Oral, Every 8 hours PRN    amLODIPine (NORVASC) 5 mg, Oral, Daily    benzonatate (TESSALON PERLES) 100 mg, Oral, 3 times daily PRN    CoQ10 200 mg, Daily    cyclobenzaprine (FLEXERIL) 5 mg, Oral, Daily at bedtime    fluticasone (FLONASE) 50 mcg/act nasal spray 1 spray, Nasal, Daily    hydrocortisone 1 % cream Topical,  "3 times daily    irbesartan (AVAPRO) 150 mg, Oral, Daily at bedtime, Total 225 mg daily    irbesartan (AVAPRO) 75 mg, Oral, Daily at bedtime, Total 225 mg daily    LORazepam (ATIVAN) 0.5 mg, Oral, Once    methylPREDNISolone 4 MG tablet therapy pack Use as directed on package    multivitamin (THERAGRAN) TABS 1 tablet, Daily    NON FORMULARY RELIEF FACTOR - ICARIIN 400MG , OMEGA 2800MG, CURCUMIN 1334MG, RESERVATROL 140 MG    ofloxacin (FLOXIN) 0.3 % otic solution 10 drops, Left Ear, 2 times daily    Omega-3 Fatty Acids (OMEGA-3 CF) 1000 MG CAPS 1 capsule, Daily    tiZANidine (ZANAFLEX) 2 mg, Oral, Every 8 hours PRN    triamcinolone (KENALOG) 0.1 % cream     Vitamin D3 2,000 Units, Daily   Allergies[4]   Medications Ordered Prior to Encounter[5]   Social History[6]     Objective   Ht 5' 6\" (1.676 m)   Wt 83.5 kg (184 lb)   BMI 29.70 kg/m²      Pain Score:   7  Physical Exam  Constitutional: normal, well developed, well nourished, alert, in no distress and non-toxic and no overt pain behavior.  Eyes: anicteric  HEENT: grossly intact  Neck: supple, symmetric, trachea midline and no masses   Pulmonary: even and unlabored  Cardiovascular: No edema or pitting edema present  Skin: Normal without rashes or lesions and well hydrated  Psychiatric: Mood and affect appropriate  Neurologic: Cranial Nerves II-XII grossly intact  Musculoskeletal: Range of motion lumbar spine is limited with extension.  Tenderness lumbar paraspinals.  Positive straight leg raise on the left.  Strength preserved in lower limbs.      Administrative Statements   I have spent a total time of 31 minutes in caring for this patient on the day of the visit/encounter including Diagnostic results, Prognosis, Risks and benefits of tx options, Instructions for management, Impressions, Documenting in the medical record, Reviewing/placing orders in the medical record (including tests, medications, and/or procedures), and Obtaining or reviewing history  .   "     [1]   Past Medical History:  Diagnosis Date    Arthritis     Basal cell carcinoma     Hypertension     Kidney stone     Skin cancer     Squamous cell carcinoma of skin    [2]   Past Surgical History:  Procedure Laterality Date    KIDNEY STONE SURGERY  1982    MENISCECTOMY  2000    NE [facility]SC WRST SURG W/RLS TRANSVRS CARPL LIGM Right 1/19/2021    Procedure: RELEASE CARPAL TUNNEL ENDOSCOPIC;  Surgeon: Tyshawn Vega MD;  Location: [facility];  Service: Orthopedics    TONSILLECTOMY AND ADENOIDECTOMY  1940   [3]   Family History  Problem Relation Name Age of Onset    Arthritis Family      Hypertension Family      Cancer Family      Breast cancer Mother      Heart disease Father     [4]   Allergies  Allergen Reactions    Sulfate    [5]   Current Outpatient Medications on File Prior to Visit   Medication Sig Dispense Refill    acetaminophen (TYLENOL) 650 mg CR tablet Take 1 tablet (650 mg total) by mouth every 8 (eight) hours as needed for mild pain 30 tablet 0    amLODIPine (NORVASC) 5 mg tablet TAKE 1 TABLET BY MOUTH DAILY 90 tablet 3    Cholecalciferol (VITAMIN D3) 86129 units TABS Take 2,000 Units by mouth in the morning.      Coenzyme Q10 (COQ10) 100 MG CAPS Take 200 mg by mouth daily      cyclobenzaprine (FLEXERIL) 10 mg tablet Take 0.5 tablets (5 mg total) by mouth daily at bedtime 15 tablet 0    irbesartan (AVAPRO) 150 mg tablet Take 1 tablet (150 mg total) by mouth daily at bedtime Total 225 mg daily 90 tablet 1    irbesartan (AVAPRO) 75 mg tablet Take 1 tablet (75 mg total) by mouth daily at bedtime Total 225 mg daily 90 tablet 1    methylPREDNISolone 4 MG tablet therapy pack Use as directed on package 21 each 0    multivitamin (THERAGRAN) TABS Take 1 tablet by mouth in the morning.      NON FORMULARY RELIEF FACTOR - ICARIIN 400MG , OMEGA 2800MG, CURCUMIN 1334MG, RESERVATROL 140 MG      Omega-3 Fatty Acids (OMEGA-3 CF) 1000 MG CAPS Take 1 capsule by mouth in the morning.      benzonatate (TESSALON PERLES)  100 mg capsule Take 1 capsule (100 mg total) by mouth 3 (three) times a day as needed for cough (Patient not taking: Reported on 5/20/2025) 20 capsule 0    fluticasone (FLONASE) 50 mcg/act nasal spray 1 spray into each nostril daily (Patient not taking: Reported on 5/20/2025) 5 mL 0    hydrocortisone 1 % cream Apply topically 3 (three) times a day (Patient not taking: Reported on 3/27/2025) 100 g 3    LORazepam (Ativan) 0.5 mg tablet Take 1 tablet (0.5 mg total) by mouth 1 (one) time for 1 dose 1 tablet 0    ofloxacin (FLOXIN) 0.3 % otic solution Administer 10 drops into the left ear 2 (two) times a day (Patient not taking: Reported on 3/27/2025) 15 mL 0    tiZANidine (ZANAFLEX) 2 mg tablet Take 1 tablet (2 mg total) by mouth every 8 (eight) hours as needed for muscle spasms (Patient not taking: Reported on 6/20/2025) 90 tablet 1    triamcinolone (KENALOG) 0.1 % cream  (Patient not taking: Reported on 5/23/2025)       No current facility-administered medications on file prior to visit.   [6]   Social History  Tobacco Use    Smoking status: Never    Smokeless tobacco: Never   Vaping Use    Vaping status: Never Used   Substance and Sexual Activity    Alcohol use: Never    Drug use: Never    Sexual activity: Not Currently

## 2025-06-25 ENCOUNTER — APPOINTMENT (OUTPATIENT)
Dept: RADIOLOGY | Facility: HOSPITAL | Age: 85
End: 2025-06-25
Payer: MEDICARE

## 2025-06-25 ENCOUNTER — HOSPITAL ENCOUNTER (OUTPATIENT)
Facility: AMBULARY SURGERY CENTER | Age: 85
Setting detail: OUTPATIENT SURGERY
Discharge: HOME | End: 2025-06-25
Attending: STUDENT IN AN ORGANIZED HEALTH CARE EDUCATION/TRAINING PROGRAM | Admitting: STUDENT IN AN ORGANIZED HEALTH CARE EDUCATION/TRAINING PROGRAM
Payer: MEDICARE

## 2025-06-25 VITALS
HEART RATE: 118 BPM | WEIGHT: 184 LBS | RESPIRATION RATE: 18 BRPM | HEIGHT: 66 IN | BODY MASS INDEX: 29.57 KG/M2 | DIASTOLIC BLOOD PRESSURE: 92 MMHG | TEMPERATURE: 98.7 F | OXYGEN SATURATION: 96 % | SYSTOLIC BLOOD PRESSURE: 147 MMHG

## 2025-06-25 PROBLEM — M54.16 LUMBAR RADICULOPATHY: Status: ACTIVE | Noted: 2025-06-25

## 2025-06-25 PROCEDURE — 62323 NJX INTERLAMINAR LMBR/SAC: CPT | Performed by: STUDENT IN AN ORGANIZED HEALTH CARE EDUCATION/TRAINING PROGRAM

## 2025-06-25 RX ORDER — METHYLPREDNISOLONE ACETATE 80 MG/ML
INJECTION, SUSPENSION INTRA-ARTICULAR; INTRALESIONAL; INTRAMUSCULAR; SOFT TISSUE AS NEEDED
Status: DISCONTINUED | OUTPATIENT
Start: 2025-06-25 | End: 2025-06-25 | Stop reason: HOSPADM

## 2025-06-25 RX ORDER — LIDOCAINE HYDROCHLORIDE 10 MG/ML
INJECTION, SOLUTION EPIDURAL; INFILTRATION; INTRACAUDAL; PERINEURAL AS NEEDED
Status: DISCONTINUED | OUTPATIENT
Start: 2025-06-25 | End: 2025-06-25 | Stop reason: HOSPADM

## 2025-06-25 NOTE — DISCHARGE INSTRUCTIONS
Epidural Steroid Injection   WHAT YOU NEED TO KNOW:   An epidural steroid injection (ANGLE) is a procedure to inject steroid medicine into the epidural space. The epidural space is between your spinal cord and vertebrae. Steroids reduce inflammation and fluid buildup in your spine that may be causing pain. You may be given pain medicine along with the steroids.          ACTIVITY  Do not drive or operate machinery today.  No strenuous activity today - bending, lifting, etc.  You may resume normal activites starting tomorrow - start slowly and as tolerated.  You may shower today, but no tub baths or hot tubs.  You may have numbness for several hours from the local anesthetic. Please use caution and common sense, especially with weight-bearing activities.    CARE OF THE INJECTION SITE  If you have soreness or pain, apply ice to the area today (20 minutes on/20 minutes off).  Starting tomorrow, you may use warm, moist heat or ice if needed.  You may have an increase or change in your discomfort for 36-48 hours after your treatment.  Apply ice and continue with any pain medication you have been prescribed.  Notify the [facility] if you have any of the following: redness, drainage, swelling, headache, stiff neck or fever above 100°F.    SPECIAL INSTRUCTIONS  Our office will contact you in approximately 14 days for a progress report.    MEDICATIONS  Continue to take all routine medications.  Our office may have instructed you to hold some medications.    As no general anesthesia was used in today's procedure, you should not experience any side effects related to anesthesia.     If you are diabetic, the steroids used in today's injection may temporarily increase your blood sugar levels after the first few days after your injection. Please keep a close eye on your sugars and alert the doctor who manages your diabetes if your sugars are significantly high from your baseline or you are symptomatic.     If you have a  problem specifically related to your procedure, please call our office at [phone].  Problems not related to your procedure should be directed to your primary care physician.

## 2025-06-25 NOTE — OP NOTE
OPERATIVE REPORT  PATIENT NAME: Rayray Dunn    :  1940  MRN: [id number]  Pt Location: [facility] MINOR/PAIN ROOM 01    SURGERY DATE: 2025    Surgeons and Role:     * Brant Esteban DO - Primary    Preop Diagnosis:  Lumbar radiculopathy [M54.16]    Post-Op Diagnosis Codes:     * Lumbar radiculopathy [M54.16]    Procedure(s):  L5-S1 LUMBAR EPIDURAL STEROID INJECTION    Specimen(s):  * No specimens in log *    Estimated Blood Loss:   Minimal    Drains:  * No LDAs found *    Anesthesia Type:   Local    Operative Indications:  Lumbar radiculopathy [M54.16]          PROCEDURE:  The patient gave informed written consent to proceed with this procedure following a detailed discussion of the risks and benefits associated with epidural steroid injection in the lumbar spine at the L5-S1 level including but not limited to infection, bleeding, headache, intrathecal injection, allergic reaction, further exacerbation of current symptoms, neurological injury, and lack of efficacy. The patient was then placed in the prone position, the skin over the lumbar area was prepped with chlorhexadine, and the site was marked and draped with sterile towels. Strict sterile technique was maintained throughout the procedure.  A timeout was performed with full staff present to identify the patient, verify the procedure being performed, and review allergies.        Fluoroscopy was used to visualize the aforementioned disc space.  The skin and subcutaneous tissue over this level was anesthetized by infiltration of 1% lidocaine. A 20 guage touhy needle was inserted under fluoroscopic guidance by coaxial technique and advanced towards the interspace.  Loss of resistance with normal saline was used to find the epidural space.  One  pass was required and there was no paresthesia.  Contrast dye was injected under live fluoroscopy demonstrating a typical epidural pattern with no evidence of intravascular or intrathecal injection in  both the AP and contralateral oblique views.  After negative aspiration, 80 mg Depomedrol and 2 ml Normal Saline was injected.   The needle was then flushed and withdrawn.      The patient tolerated the procedure well, there were no apparent complications, and was discharged in stable condition.  Written and verbal discharge instructions were reviewed with the patient prior to discharge.        SIGNATURE: Brant Esteban,   DATE: June 25, 2025  TIME: 2:22 PM

## 2025-07-07 DIAGNOSIS — I10 ESSENTIAL HYPERTENSION: ICD-10-CM

## 2025-07-07 NOTE — TELEPHONE ENCOUNTER
Reason for call:   [x] Refill   [] Prior Auth  [] Other:     Office:   [x] PCP/Provider - [facility] - Favian Mendieta MD   [] Specialty/Provider -     Medication: irbesartan (AVAPRO) 75 mg tablet    Dose/Frequency: Take 1 tablet (75 mg total) by mouth daily at bedtime Total 225 mg daily     Quantity: 90 tablet    Medication: irbesartan (AVAPRO) 150 mg tablet    Dose/Frequency:  Take 1 tablet (150 mg total) by mouth daily at bedtime Total 225 mg daily     Quantity: 90 tablet    Pharmacy: [facility] - [address] [phone]    Local Pharmacy   Does the patient have enough for 3 days?   [] Yes   [] No - Send as HP to POD    Mail Away Pharmacy   Does the patient have enough for 10 days?   [x] Yes   [] No - Send as HP to POD

## 2025-07-09 ENCOUNTER — TELEPHONE (OUTPATIENT)
Dept: PAIN MEDICINE | Facility: CLINIC | Age: 85
End: 2025-07-09

## 2025-07-09 RX ORDER — IRBESARTAN 75 MG/1
75 TABLET ORAL
Qty: 90 TABLET | Refills: 1 | Status: SHIPPED | OUTPATIENT
Start: 2025-07-09

## 2025-07-09 RX ORDER — IRBESARTAN 150 MG/1
150 TABLET ORAL
Qty: 90 TABLET | Refills: 1 | Status: SHIPPED | OUTPATIENT
Start: 2025-07-09

## 2025-07-21 ENCOUNTER — OFFICE VISIT (OUTPATIENT)
Dept: INTERNAL MEDICINE CLINIC | Facility: CLINIC | Age: 85
End: 2025-07-21
Payer: MEDICARE

## 2025-07-21 VITALS
OXYGEN SATURATION: 98 % | HEART RATE: 80 BPM | HEIGHT: 66 IN | SYSTOLIC BLOOD PRESSURE: 136 MMHG | TEMPERATURE: 98 F | WEIGHT: 178 LBS | DIASTOLIC BLOOD PRESSURE: 74 MMHG | BODY MASS INDEX: 28.61 KG/M2

## 2025-07-21 DIAGNOSIS — M54.9 MUSCULOSKELETAL BACK PAIN: Primary | ICD-10-CM

## 2025-07-21 DIAGNOSIS — I10 PRIMARY HYPERTENSION: ICD-10-CM

## 2025-07-21 PROCEDURE — 99214 OFFICE O/P EST MOD 30 MIN: CPT | Performed by: INTERNAL MEDICINE

## 2025-07-21 PROCEDURE — G2211 COMPLEX E/M VISIT ADD ON: HCPCS | Performed by: INTERNAL MEDICINE

## 2025-07-21 NOTE — PROGRESS NOTES
Assessment/Plan:           1. Musculoskeletal back pain  Comments:  Had consultation with pain management and improved after injection.  2. Primary hypertension  -     CBC and differential  -     Comprehensive metabolic panel  -     Urinalysis with microscopic         1. Essential hypertension (Primary)  - continue current regimen with amlodipine 5 mg daily and avapro 225 mg daily    2. Musculoskeletal back pain  - continue following with pain management  - continue tylenol, ibuprofen, cyclobenzaprine PRN       No problem-specific Assessment & Plan notes found for this encounter.           Subjective:      Patient ID: Rayray Dunn is a 84 y.o. male.    Patient presents for 4 month follow up for blood pressure. Patient reports checking his blood pressure at home. He states he typically checks his blood pressure in the AM after a few sips of coffee. His blood pressure is higher in the AM running in the low 140's/70's. If he checks his blood pressure in the PM he reports it is in the low 130's/70's. Patient is compliant with his blood pressure medications. He reports improvement in his back pain since steroid injection in June. He now only needs tylenol and ibuprofen from time to time. He reports feeling more fatigued recently and having some sweating spells which he feels are stress-related.        The following portions of the patient's history were reviewed and updated as appropriate: He  has a past medical history of Arthritis, Basal cell carcinoma, Hypertension, Kidney stone, Skin cancer, and Squamous cell carcinoma of skin.  He   Patient Active Problem List    Diagnosis Date Noted    Lumbar radiculopathy 06/25/2025    Rotator cuff strain, left, initial encounter 09/09/2024    Impingement syndrome of left shoulder 01/15/2024    Primary osteoarthritis of left shoulder 01/10/2023    Impingement syndrome of right shoulder 04/25/2022    Primary osteoarthritis of right shoulder 04/25/2022    PVD (peripheral  vascular disease) (HCC) 04/14/2021    Lumbar stenosis with neurogenic claudication     BPH associated with nocturia 12/14/2015    White coat syndrome with diagnosis of hypertension 05/18/2012    Mixed hyperlipidemia 05/18/2012     He  has a past surgical history that includes Kidney stone surgery (1982); Meniscectomy (2000); Tonsillectomy and adenoidectomy (1940); pr ndsc wrst surg w/rls transvrs carpl ligm (Right, 1/19/2021); and Epidural block injection (N/A, 6/25/2025).  His family history includes Arthritis in his family; Breast cancer in his mother; Cancer in his family; Heart disease in his father; Hypertension in his family.  He  reports that he has never smoked. He has never used smokeless tobacco. He reports that he does not drink alcohol and does not use drugs.  Current Outpatient Medications   Medication Sig Dispense Refill    acetaminophen (TYLENOL) 650 mg CR tablet Take 1 tablet (650 mg total) by mouth every 8 (eight) hours as needed for mild pain 30 tablet 0    amLODIPine (NORVASC) 5 mg tablet TAKE 1 TABLET BY MOUTH DAILY 90 tablet 3    Cholecalciferol (VITAMIN D3) 37254 units TABS Take 2,000 Units by mouth in the morning.      Coenzyme Q10 (COQ10) 100 MG CAPS Take 200 mg by mouth in the morning.      irbesartan (AVAPRO) 150 mg tablet Take 1 tablet (150 mg total) by mouth daily at bedtime Total 225 mg daily 90 tablet 1    irbesartan (AVAPRO) 75 mg tablet Take 1 tablet (75 mg total) by mouth daily at bedtime Total 225 mg daily 90 tablet 1    multivitamin (THERAGRAN) TABS Take 1 tablet by mouth in the morning.      NON FORMULARY RELIEF FACTOR - ICARIIN 400MG , OMEGA 2800MG, CURCUMIN 1334MG, RESERVATROL 140 MG      Omega-3 Fatty Acids (OMEGA-3 CF) 1000 MG CAPS Take 1 capsule by mouth in the morning.      TURMERIC CURCUMIN PO Take by mouth      benzonatate (TESSALON PERLES) 100 mg capsule Take 1 capsule (100 mg total) by mouth 3 (three) times a day as needed for cough (Patient not taking: Reported on  5/20/2025) 20 capsule 0    cyclobenzaprine (FLEXERIL) 10 mg tablet Take 0.5 tablets (5 mg total) by mouth daily at bedtime (Patient not taking: Reported on 7/21/2025) 15 tablet 0    fluticasone (FLONASE) 50 mcg/act nasal spray 1 spray into each nostril daily (Patient not taking: Reported on 5/20/2025) 5 mL 0    hydrocortisone 1 % cream Apply topically 3 (three) times a day (Patient not taking: Reported on 3/27/2025) 100 g 3    LORazepam (Ativan) 0.5 mg tablet Take 1 tablet (0.5 mg total) by mouth 1 (one) time for 1 dose 1 tablet 0    methylPREDNISolone 4 MG tablet therapy pack Use as directed on package (Patient not taking: Reported on 7/21/2025) 21 each 0    ofloxacin (FLOXIN) 0.3 % otic solution Administer 10 drops into the left ear 2 (two) times a day (Patient not taking: Reported on 3/27/2025) 15 mL 0    tiZANidine (ZANAFLEX) 2 mg tablet Take 1 tablet (2 mg total) by mouth every 8 (eight) hours as needed for muscle spasms (Patient not taking: Reported on 6/20/2025) 90 tablet 1    triamcinolone (KENALOG) 0.1 % cream  (Patient not taking: Reported on 5/23/2025)       No current facility-administered medications for this visit.     Current Outpatient Medications on File Prior to Visit   Medication Sig    acetaminophen (TYLENOL) 650 mg CR tablet Take 1 tablet (650 mg total) by mouth every 8 (eight) hours as needed for mild pain    amLODIPine (NORVASC) 5 mg tablet TAKE 1 TABLET BY MOUTH DAILY    Cholecalciferol (VITAMIN D3) 65065 units TABS Take 2,000 Units by mouth in the morning.    Coenzyme Q10 (COQ10) 100 MG CAPS Take 200 mg by mouth in the morning.    irbesartan (AVAPRO) 150 mg tablet Take 1 tablet (150 mg total) by mouth daily at bedtime Total 225 mg daily    irbesartan (AVAPRO) 75 mg tablet Take 1 tablet (75 mg total) by mouth daily at bedtime Total 225 mg daily    multivitamin (THERAGRAN) TABS Take 1 tablet by mouth in the morning.    NON FORMULARY RELIEF FACTOR - ICARIIN 400MG , OMEGA 2800MG, CURCUMIN  1334MG, RESERVATROL 140 MG    Omega-3 Fatty Acids (OMEGA-3 CF) 1000 MG CAPS Take 1 capsule by mouth in the morning.    TURMERIC CURCUMIN PO Take by mouth    benzonatate (TESSALON PERLES) 100 mg capsule Take 1 capsule (100 mg total) by mouth 3 (three) times a day as needed for cough (Patient not taking: Reported on 5/20/2025)    cyclobenzaprine (FLEXERIL) 10 mg tablet Take 0.5 tablets (5 mg total) by mouth daily at bedtime (Patient not taking: Reported on 7/21/2025)    fluticasone (FLONASE) 50 mcg/act nasal spray 1 spray into each nostril daily (Patient not taking: Reported on 5/20/2025)    hydrocortisone 1 % cream Apply topically 3 (three) times a day (Patient not taking: Reported on 3/27/2025)    LORazepam (Ativan) 0.5 mg tablet Take 1 tablet (0.5 mg total) by mouth 1 (one) time for 1 dose    methylPREDNISolone 4 MG tablet therapy pack Use as directed on package (Patient not taking: Reported on 7/21/2025)    ofloxacin (FLOXIN) 0.3 % otic solution Administer 10 drops into the left ear 2 (two) times a day (Patient not taking: Reported on 3/27/2025)    tiZANidine (ZANAFLEX) 2 mg tablet Take 1 tablet (2 mg total) by mouth every 8 (eight) hours as needed for muscle spasms (Patient not taking: Reported on 6/20/2025)    triamcinolone (KENALOG) 0.1 % cream  (Patient not taking: Reported on 5/23/2025)     No current facility-administered medications on file prior to visit.     There are no discontinued medications.   He is allergic to sulfate..    Review of Systems   Constitutional:  Positive for diaphoresis and fatigue. Negative for chills and fever.   HENT:  Negative for ear pain and sore throat.    Eyes:  Negative for pain and visual disturbance.   Respiratory:  Negative for cough and shortness of breath.    Cardiovascular:  Negative for chest pain and palpitations.   Gastrointestinal:  Negative for abdominal pain and vomiting.   Genitourinary:  Negative for dysuria and hematuria.   Musculoskeletal:  Positive for back  "pain (improved following steroid injection). Negative for arthralgias.   Skin:  Negative for color change and rash.   Neurological:  Negative for seizures and syncope.   All other systems reviewed and are negative.        Objective:      /74 (BP Location: Left arm, Patient Position: Sitting)   Pulse 80   Temp 98 °F (36.7 °C) (Temporal)   Ht 5' 6\" (1.676 m)   Wt 80.7 kg (178 lb)   SpO2 98%   BMI 28.73 kg/m²     Results Reviewed       None            No results found for this or any previous visit (from the past 8 weeks).     Physical Exam  Vitals reviewed.   Constitutional:       General: He is not in acute distress.     Appearance: Normal appearance. He is not ill-appearing or toxic-appearing.   HENT:      Head: Normocephalic and atraumatic.      Right Ear: Tympanic membrane and external ear normal.      Left Ear: Tympanic membrane and external ear normal.      Nose: Nose normal.      Mouth/Throat:      Mouth: Mucous membranes are moist.      Pharynx: No oropharyngeal exudate.     Eyes:      Extraocular Movements: Extraocular movements intact.      Conjunctiva/sclera: Conjunctivae normal.      Pupils: Pupils are equal, round, and reactive to light.       Cardiovascular:      Rate and Rhythm: Normal rate.      Pulses:           Dorsalis pedis pulses are 1+ on the right side and 1+ on the left side.      Heart sounds: Normal heart sounds. No murmur heard.     No friction rub. No gallop.   Pulmonary:      Effort: Pulmonary effort is normal. No respiratory distress.      Breath sounds: No wheezing, rhonchi or rales.   Abdominal:      Tenderness: There is no abdominal tenderness. There is no guarding or rebound.     Musculoskeletal:         General: No swelling. Normal range of motion.      Cervical back: Normal range of motion. No rigidity.      Right lower leg: No edema.      Left lower leg: No edema.     Skin:     General: Skin is warm.      Coloration: Skin is not jaundiced.      Findings: No erythema. "     Neurological:      General: No focal deficit present.      Mental Status: He is alert and oriented to person, place, and time. Mental status is at baseline.     Psychiatric:         Mood and Affect: Mood normal.         Behavior: Behavior normal.         Thought Content: Thought content normal.         Judgment: Judgment normal.           Malgorzata Villafuerte, PGY-1  5:48 PM

## 2025-07-23 ENCOUNTER — ESTABLISHED COMPREHENSIVE EXAM (OUTPATIENT)
Dept: URBAN - METROPOLITAN AREA CLINIC 6 | Facility: CLINIC | Age: 85
End: 2025-07-23

## 2025-07-23 DIAGNOSIS — H02.835: ICD-10-CM

## 2025-07-23 DIAGNOSIS — H35.371: ICD-10-CM

## 2025-07-23 DIAGNOSIS — H18.513: ICD-10-CM

## 2025-07-23 DIAGNOSIS — H02.832: ICD-10-CM

## 2025-07-23 DIAGNOSIS — H25.813: ICD-10-CM

## 2025-07-23 DIAGNOSIS — H10.45: ICD-10-CM

## 2025-07-23 DIAGNOSIS — H35.3131: ICD-10-CM

## 2025-07-23 DIAGNOSIS — H04.123: ICD-10-CM

## 2025-07-23 PROCEDURE — 92134 CPTRZ OPH DX IMG PST SGM RTA: CPT

## 2025-07-23 PROCEDURE — 92014 COMPRE OPH EXAM EST PT 1/>: CPT

## 2025-07-23 ASSESSMENT — VISUAL ACUITY
OD_CC: 20/25
OS_CC: 20/30

## 2025-07-23 ASSESSMENT — TONOMETRY
OS_IOP_MMHG: 10
OD_IOP_MMHG: 9

## (undated) DEVICE — OCCLUSIVE GAUZE STRIP,3% BISMUTH TRIBROMOPHENATE IN PETROLATUM BLEND: Brand: XEROFORM

## (undated) DEVICE — TOWEL SURG XR DETECT GREEN STRL RFD

## (undated) DEVICE — PADDING CAST 4 IN  COTTON STRL

## (undated) DEVICE — GLOVE SRG BIOGEL 7.5

## (undated) DEVICE — GLOVE INDICATOR PI UNDERGLOVE SZ 8 BLUE

## (undated) DEVICE — CURITY STRETCH BANDAGE: Brand: CURITY

## (undated) DEVICE — CUFF TOURNIQUET 18 X 4 IN QUICK CONNECT DISP 1 BLADDER

## (undated) DEVICE — NEEDLE 25G X 1 1/2

## (undated) DEVICE — TRAY PAIN SUPPORT

## (undated) DEVICE — ACE WRAP 3 IN VELCRO LATEX FREE

## (undated) DEVICE — STERILE BETHLEHEM PLASTIC HAND: Brand: CARDINAL HEALTH

## (undated) DEVICE — DISPOSABLE EQUIPMENT COVER: Brand: SMALL TOWEL DRAPE

## (undated) DEVICE — IV SET EXT SM BORE CARESITE 8IN

## (undated) DEVICE — CHLORAPREP HI-LITE 26ML ORANGE

## (undated) DEVICE — ADHESIVE SKN CLSR HISTOACRYL FLEX 0.5ML LF

## (undated) DEVICE — FLEXIBLE ADHESIVE BANDAGE,X-LARGE: Brand: CURITY

## (undated) DEVICE — SPONGE PVP SCRUB WING STERILE

## (undated) DEVICE — RETROGRADE KNIFE BOX OF 6: Brand: ECTRA

## (undated) DEVICE — GLOVE SRG LF STRL BGL SKNSNS 8 PF

## (undated) DEVICE — PLASTIC ADHESIVE BANDAGE: Brand: CURITY

## (undated) DEVICE — STRL COTTON TIP APPLCTR 6IN PK: Brand: CARDINAL HEALTH

## (undated) DEVICE — GAUZE SPONGES,16 PLY: Brand: CURITY

## (undated) DEVICE — INTENDED FOR TISSUE SEPARATION, AND OTHER PROCEDURES THAT REQUIRE A SHARP SURGICAL BLADE TO PUNCTURE OR CUT.: Brand: BARD-PARKER SAFETY BLADES SIZE 15, STERILE